# Patient Record
Sex: FEMALE | Race: WHITE | NOT HISPANIC OR LATINO | URBAN - METROPOLITAN AREA
[De-identification: names, ages, dates, MRNs, and addresses within clinical notes are randomized per-mention and may not be internally consistent; named-entity substitution may affect disease eponyms.]

---

## 2017-04-10 ENCOUNTER — EMERGENCY (EMERGENCY)
Facility: HOSPITAL | Age: 27
LOS: 1 days | Discharge: HOME | End: 2017-04-10
Attending: EMERGENCY MEDICINE | Admitting: EMERGENCY MEDICINE
Payer: MEDICAID

## 2017-04-10 VITALS
HEART RATE: 60 BPM | OXYGEN SATURATION: 100 % | SYSTOLIC BLOOD PRESSURE: 126 MMHG | TEMPERATURE: 98 F | DIASTOLIC BLOOD PRESSURE: 92 MMHG | RESPIRATION RATE: 16 BRPM

## 2017-04-10 VITALS
TEMPERATURE: 98 F | SYSTOLIC BLOOD PRESSURE: 114 MMHG | OXYGEN SATURATION: 99 % | DIASTOLIC BLOOD PRESSURE: 66 MMHG | HEART RATE: 69 BPM | RESPIRATION RATE: 17 BRPM

## 2017-04-10 DIAGNOSIS — Q21.1 ATRIAL SEPTAL DEFECT: Chronic | ICD-10-CM

## 2017-04-10 DIAGNOSIS — K46.9 UNSPECIFIED ABDOMINAL HERNIA WITHOUT OBSTRUCTION OR GANGRENE: Chronic | ICD-10-CM

## 2017-04-10 DIAGNOSIS — Z98.89 OTHER SPECIFIED POSTPROCEDURAL STATES: Chronic | ICD-10-CM

## 2017-04-10 DIAGNOSIS — O14.90 UNSPECIFIED PRE-ECLAMPSIA, UNSPECIFIED TRIMESTER: Chronic | ICD-10-CM

## 2017-04-10 LAB
ALBUMIN SERPL ELPH-MCNC: 4 G/DL — SIGNIFICANT CHANGE UP (ref 3.3–5)
ALP SERPL-CCNC: 113 U/L — SIGNIFICANT CHANGE UP (ref 40–120)
ALT FLD-CCNC: 57 U/L — HIGH (ref 4–33)
AST SERPL-CCNC: 35 U/L — HIGH (ref 4–32)
BASE EXCESS BLDV CALC-SCNC: 0.7 MMOL/L — SIGNIFICANT CHANGE UP
BASOPHILS # BLD AUTO: 0.04 K/UL — SIGNIFICANT CHANGE UP (ref 0–0.2)
BASOPHILS NFR BLD AUTO: 0.4 % — SIGNIFICANT CHANGE UP (ref 0–2)
BILIRUB SERPL-MCNC: 0.2 MG/DL — SIGNIFICANT CHANGE UP (ref 0.2–1.2)
BLOOD GAS VENOUS - CREATININE: 0.76 MG/DL — SIGNIFICANT CHANGE UP (ref 0.5–1.3)
BUN SERPL-MCNC: 18 MG/DL — SIGNIFICANT CHANGE UP (ref 7–23)
CALCIUM SERPL-MCNC: 9.2 MG/DL — SIGNIFICANT CHANGE UP (ref 8.4–10.5)
CHLORIDE BLDV-SCNC: 105 MMOL/L — SIGNIFICANT CHANGE UP (ref 96–108)
CHLORIDE SERPL-SCNC: 103 MMOL/L — SIGNIFICANT CHANGE UP (ref 98–107)
CO2 SERPL-SCNC: 26 MMOL/L — SIGNIFICANT CHANGE UP (ref 22–31)
CREAT SERPL-MCNC: 0.77 MG/DL — SIGNIFICANT CHANGE UP (ref 0.5–1.3)
EOSINOPHIL # BLD AUTO: 0.34 K/UL — SIGNIFICANT CHANGE UP (ref 0–0.5)
EOSINOPHIL NFR BLD AUTO: 3.1 % — SIGNIFICANT CHANGE UP (ref 0–6)
GAS PNL BLDV: 138 MMOL/L — SIGNIFICANT CHANGE UP (ref 136–146)
GLUCOSE BLDV-MCNC: 102 — HIGH (ref 70–99)
GLUCOSE SERPL-MCNC: 100 MG/DL — HIGH (ref 70–99)
HCO3 BLDV-SCNC: 23 MMOL/L — SIGNIFICANT CHANGE UP (ref 20–27)
HCT VFR BLD CALC: 38.9 % — SIGNIFICANT CHANGE UP (ref 34.5–45)
HCT VFR BLDV CALC: 41.3 % — SIGNIFICANT CHANGE UP (ref 34.5–45)
HGB BLD-MCNC: 13.2 G/DL — SIGNIFICANT CHANGE UP (ref 11.5–15.5)
HGB BLDV-MCNC: 13.5 G/DL — SIGNIFICANT CHANGE UP (ref 11.5–15.5)
IMM GRANULOCYTES NFR BLD AUTO: 0.2 % — SIGNIFICANT CHANGE UP (ref 0–1.5)
LACTATE BLDV-MCNC: 1.3 MMOL/L — SIGNIFICANT CHANGE UP (ref 0.5–2)
LIDOCAIN IGE QN: 79.2 U/L — HIGH (ref 7–60)
LYMPHOCYTES # BLD AUTO: 2.43 K/UL — SIGNIFICANT CHANGE UP (ref 1–3.3)
LYMPHOCYTES # BLD AUTO: 22.4 % — SIGNIFICANT CHANGE UP (ref 13–44)
MCHC RBC-ENTMCNC: 30.3 PG — SIGNIFICANT CHANGE UP (ref 27–34)
MCHC RBC-ENTMCNC: 33.9 % — SIGNIFICANT CHANGE UP (ref 32–36)
MCV RBC AUTO: 89.2 FL — SIGNIFICANT CHANGE UP (ref 80–100)
MONOCYTES # BLD AUTO: 0.58 K/UL — SIGNIFICANT CHANGE UP (ref 0–0.9)
MONOCYTES NFR BLD AUTO: 5.3 % — SIGNIFICANT CHANGE UP (ref 2–14)
NEUTROPHILS # BLD AUTO: 7.45 K/UL — HIGH (ref 1.8–7.4)
NEUTROPHILS NFR BLD AUTO: 68.6 % — SIGNIFICANT CHANGE UP (ref 43–77)
PCO2 BLDV: 52 MMHG — HIGH (ref 41–51)
PH BLDV: 7.32 PH — SIGNIFICANT CHANGE UP (ref 7.32–7.43)
PLATELET # BLD AUTO: 340 K/UL — SIGNIFICANT CHANGE UP (ref 150–400)
PMV BLD: 9.8 FL — SIGNIFICANT CHANGE UP (ref 7–13)
PO2 BLDV: 33 MMHG — LOW (ref 35–40)
POTASSIUM BLDV-SCNC: 3.8 MMOL/L — SIGNIFICANT CHANGE UP (ref 3.4–4.5)
POTASSIUM SERPL-MCNC: 4 MMOL/L — SIGNIFICANT CHANGE UP (ref 3.5–5.3)
POTASSIUM SERPL-SCNC: 4 MMOL/L — SIGNIFICANT CHANGE UP (ref 3.5–5.3)
PROT SERPL-MCNC: 7.3 G/DL — SIGNIFICANT CHANGE UP (ref 6–8.3)
RBC # BLD: 4.36 M/UL — SIGNIFICANT CHANGE UP (ref 3.8–5.2)
RBC # FLD: 11.7 % — SIGNIFICANT CHANGE UP (ref 10.3–14.5)
SAO2 % BLDV: 53.5 % — LOW (ref 60–85)
SODIUM SERPL-SCNC: 140 MMOL/L — SIGNIFICANT CHANGE UP (ref 135–145)
WBC # BLD: 10.86 K/UL — HIGH (ref 3.8–10.5)
WBC # FLD AUTO: 10.86 K/UL — HIGH (ref 3.8–10.5)

## 2017-04-10 PROCEDURE — 99285 EMERGENCY DEPT VISIT HI MDM: CPT | Mod: 25

## 2017-04-10 PROCEDURE — 93010 ELECTROCARDIOGRAM REPORT: CPT

## 2017-04-10 PROCEDURE — 76705 ECHO EXAM OF ABDOMEN: CPT | Mod: 26

## 2017-04-10 RX ORDER — ACETAMINOPHEN 500 MG
650 TABLET ORAL ONCE
Qty: 0 | Refills: 0 | Status: COMPLETED | OUTPATIENT
Start: 2017-04-10 | End: 2017-04-10

## 2017-04-10 RX ORDER — FAMOTIDINE 10 MG/ML
1 INJECTION INTRAVENOUS
Qty: 28 | Refills: 0 | OUTPATIENT
Start: 2017-04-10 | End: 2017-04-24

## 2017-04-10 RX ORDER — FAMOTIDINE 10 MG/ML
20 INJECTION INTRAVENOUS ONCE
Qty: 0 | Refills: 0 | Status: COMPLETED | OUTPATIENT
Start: 2017-04-10 | End: 2017-04-10

## 2017-04-10 RX ADMIN — Medication 10 MILLIGRAM(S): at 04:25

## 2017-04-10 RX ADMIN — FAMOTIDINE 20 MILLIGRAM(S): 10 INJECTION INTRAVENOUS at 03:50

## 2017-04-10 RX ADMIN — Medication 650 MILLIGRAM(S): at 04:50

## 2017-04-10 RX ADMIN — Medication 30 MILLILITER(S): at 03:50

## 2017-04-10 RX ADMIN — Medication 650 MILLIGRAM(S): at 03:50

## 2017-04-10 NOTE — ED PROVIDER NOTE - INTERPRETATION
normal sinus rhythm, Normal axis, Normal KS interval and QRS complex. There are no acute ischemic ST or T-wave changes.

## 2017-04-10 NOTE — ED PROVIDER NOTE - MEDICAL DECISION MAKING DETAILS
26F with epigastric pain radiating to the back. Ddx: pancreatitis, gastritis, PUD. Low suspicion for charan; will get RUQ US if LFTs abnormal. Do not suspect ACS or aortic dissection due to pt's age, presentation, and lack of risk factors. Plan: labs, pain control, ucg, reassess.

## 2017-04-10 NOTE — ED ADULT NURSE NOTE - CHIEF COMPLAINT
The patient is a 26y Female complaining of The patient is a 26y Female complaining of epigastric pain

## 2017-04-10 NOTE — ED ADULT TRIAGE NOTE - CHIEF COMPLAINT QUOTE
Patient c/o epigastric pain that also goes into her back. Patient c/o epigastric pain that also goes into her back. Heart surg 2002.

## 2017-04-10 NOTE — ED ADULT NURSE NOTE - OBJECTIVE STATEMENT
pt on bed aox4 c/o epigastric pain radiates to the back started early morning started as on and off and getting more constant and worsening, denies Nausea vomiting diarrhea dysuria CP HA dizziness palpitation MD at bedside eval the pt. will monitor

## 2017-04-10 NOTE — ED ADULT NURSE NOTE - PMH
37 weeks gestation of pregnancy    ASD (atrial septal defect)  H/o  Diabetes mellitus  gestational  Hernia  h/o  HTN (hypertension)  h/o  1st pregnancy  Shingles  h/o 2007

## 2017-04-10 NOTE — ED ADULT NURSE NOTE - CHPI ED SYMPTOMS NEG
no abdominal distension/no vomiting/no diarrhea/no nausea/no fever/no hematuria/no dysuria/no chills/no burning urination/no blood in stool

## 2017-04-10 NOTE — ED PROVIDER NOTE - OBJECTIVE STATEMENT
26F ASD s/p repair in 2002 p/w epigastric pain. The pain is sharp, intermittent, started as discomfort/fullness, now severe, radiating to the back, associated with SOB, no association with food. No fevers/chills, CP, cough, N/V/D, abd pain, or dysuria/urgency. MP 3/22.  No recent bad food though pt has been drinking protein shakes. No hx of gallstones/charan. 26F ASD s/p repair in 2002 p/w epigastric pain. The pain is sharp, intermittent, started as discomfort/fullness, now severe, radiating to the back, associated with SOB, no association with food. No fevers/chills, CP, cough, N/V/D, abd pain, or dysuria/urgency. LMP 3/22.  No recent bad food though pt has been drinking protein shakes. No hx of gallstones/charan or pancreatitis. Pt states she never drinks EtOH. 26F ASD s/p repair in 2002 p/w epigastric pain. The pain is sharp, intermittent, started as discomfort/fullness, now severe, radiating to the back, associated with SOB, no association with food. No fevers/chills, CP, cough, N/V/D, abd pain, or dysuria/urgency. LMP 3/22.  No recent bad food though pt has been drinking protein shakes. No hx of gallstones/charan or pancreatitis. Pt states she never drinks EtOH.  Klepfish: 26F PMH ASD repair (no issues since) Epigastric pain, intermittent x1d now constant over last few hrs, occasional radiation to back, no exacerbating/alleviating factors, not similar to prior, "sharp." Minimal SOB 2/2 pain now resolved. No f/c, CP, NVD, urinary complaints, rashes, sick contacts, recent travel.

## 2017-04-10 NOTE — ED PROVIDER NOTE - ATTENDING CONTRIBUTION TO CARE
26F PMH ASD repair p/w epigastric pain, no other systemic complaints. Vitals wnl. Exam minimal epigastric ttp. EKG wnl.  Ddx: Likely GERD/gastritis/PUD vs. pancreatitis vs. less likely cholelithiasis.   CBC, cmp, lipase. IVF, symptom control, reassess. 26F PMH ASD repair p/w epigastric pain, no other systemic complaints. Vitals wnl. Exam minimal epigastric ttp. EKG wnl. ucg neg.  Ddx: Likely GERD/gastritis/PUD vs. pancreatitis vs. less likely cholelithiasis.   CBC, cmp, lipase. IVF, symptom control, reassess.

## 2017-04-10 NOTE — ED PROVIDER NOTE - PROGRESS NOTE DETAILS
Klepfish: Still in pain and epigastric ttp. Slight elevated lipase and lfts. will get ruq us. Pt not allergic to percocet, will give percocet. reassess. Updated pt. Pt feels better. RUQ neg for charan. Will d/c with GI referral list. Klepfish: Tolerating po, benign exam, given copy of results, comfortable for dc.

## 2017-04-10 NOTE — ED PROVIDER NOTE - CARE PLAN
Principal Discharge DX:	Abdominal pain Principal Discharge DX:	Abdominal pain  Instructions for follow-up, activity and diet:	-- Take pepcid as prescribed. Your prescription is waiting for you at the pharmacy you selected. Please the additional information provided on gastritis. Take tylenol for pain. Avoid motrin/ibuprofen/aleve as these medications can irritate the stomach.  -- See referral list to make follow up appointment with a gastroenterology (GI) specialist within 1 week.  -- Return to ER immediately for new or worsening symptoms, any urgent issues, or for any concerns.

## 2017-04-10 NOTE — ED PROVIDER NOTE - PLAN OF CARE
-- Take pepcid as prescribed. Your prescription is waiting for you at the pharmacy you selected. Please the additional information provided on gastritis. Take tylenol for pain. Avoid motrin/ibuprofen/aleve as these medications can irritate the stomach.  -- See referral list to make follow up appointment with a gastroenterology (GI) specialist within 1 week.  -- Return to ER immediately for new or worsening symptoms, any urgent issues, or for any concerns.

## 2017-04-12 RX ORDER — FAMOTIDINE 10 MG/ML
1 INJECTION INTRAVENOUS
Qty: 28 | Refills: 0 | OUTPATIENT
Start: 2017-04-12 | End: 2017-04-26

## 2017-04-12 NOTE — ED POST DISCHARGE NOTE - REASON FOR FOLLOW-UP
Other Admin MURPHY Francois: Pt called and wants BW faxed over to [facility] [phone]. Faxed w/ confirmation

## 2017-06-03 ENCOUNTER — EMERGENCY (EMERGENCY)
Facility: HOSPITAL | Age: 27
LOS: 1 days | Discharge: HOME | End: 2017-06-03
Attending: EMERGENCY MEDICINE | Admitting: EMERGENCY MEDICINE
Payer: MEDICAID

## 2017-06-03 VITALS
HEART RATE: 53 BPM | RESPIRATION RATE: 18 BRPM | TEMPERATURE: 97 F | OXYGEN SATURATION: 100 % | DIASTOLIC BLOOD PRESSURE: 85 MMHG | SYSTOLIC BLOOD PRESSURE: 123 MMHG

## 2017-06-03 DIAGNOSIS — S39.92XA UNSPECIFIED INJURY OF LOWER BACK, INITIAL ENCOUNTER: ICD-10-CM

## 2017-06-03 DIAGNOSIS — Q21.1 ATRIAL SEPTAL DEFECT: Chronic | ICD-10-CM

## 2017-06-03 DIAGNOSIS — Z98.89 OTHER SPECIFIED POSTPROCEDURAL STATES: Chronic | ICD-10-CM

## 2017-06-03 DIAGNOSIS — O14.90 UNSPECIFIED PRE-ECLAMPSIA, UNSPECIFIED TRIMESTER: Chronic | ICD-10-CM

## 2017-06-03 DIAGNOSIS — K46.9 UNSPECIFIED ABDOMINAL HERNIA WITHOUT OBSTRUCTION OR GANGRENE: Chronic | ICD-10-CM

## 2017-06-03 PROCEDURE — 99284 EMERGENCY DEPT VISIT MOD MDM: CPT | Mod: 25

## 2017-06-03 RX ORDER — OXYCODONE HYDROCHLORIDE 5 MG/1
10 TABLET ORAL ONCE
Qty: 0 | Refills: 0 | Status: DISCONTINUED | OUTPATIENT
Start: 2017-06-03 | End: 2017-06-03

## 2017-06-03 RX ORDER — ACETAMINOPHEN 500 MG
975 TABLET ORAL ONCE
Qty: 0 | Refills: 0 | Status: COMPLETED | OUTPATIENT
Start: 2017-06-03 | End: 2017-06-03

## 2017-06-03 RX ORDER — IBUPROFEN 200 MG
600 TABLET ORAL ONCE
Qty: 0 | Refills: 0 | Status: DISCONTINUED | OUTPATIENT
Start: 2017-06-03 | End: 2017-06-03

## 2017-06-03 NOTE — ED PROVIDER NOTE - NS ED ROS FT
ROS: GENERAL: no fevers, no chills EYE: no visual changes ENT: no epistaxis, no eye pain, no throat pain CHEST: no pain with breathing,  no hemoptysis CARDIAC: no chest pain, no upper back pain GI: no abdominal pain, no hematemesis, no bright red blood per rectum : no dysuria, no hematuria MSK: no arm pain, no leg pain, +back pain SKIN: no purpura, no petechiae NEURO: no headache, no neck pain HEME: no easy bruising, no easy bleeding -ncohen

## 2017-06-03 NOTE — ED PROVIDER NOTE - OBJECTIVE STATEMENT
26 year old female otherwise healthy presenting with sudden onset L back pain when lifting child earlier today. Non-radiating. No numbness / tingling / urinary incont or retention / bowel probs / ivdu / fevers. On ros notes that had +pregnancy test lnmp 5 wks ago and has had vaginal spotting.

## 2017-06-03 NOTE — ED ADULT NURSE NOTE - OBJECTIVE STATEMENT
26 y.o. Female presents to the ED c/o lower back pain. . Pt reports lifting 3 year old child from floor and felt sharp pain. Pt drove herself to the ED. Pain upon movement. Pt also reports having positive home pregnancy test, but states wanting to have an . Pt states going to the bathroom today and when she wiped, she saw dark brown blood. Limited ROM d/t lower back pain. Denies numbness/tingling, weakness, CP, SOB, N/V/D, urinary/bowel complications, fever/chills. Pt is in no current distress. Comfort and safety provided.

## 2017-06-03 NOTE — ED ADULT TRIAGE NOTE - CHIEF COMPLAINT QUOTE
b/l lower back pain s/p picking a kid in the floor, took 600mg Advil 4hrs ago without much improvement  Drove herself here

## 2017-06-03 NOTE — ED PROVIDER NOTE - PHYSICAL EXAMINATION
Gen: well appearing, of stated age, no acute distress; Head: NC, AT; ENT: MMM, no uvular deviation; Neck: supple with full ROM; Chest: CTAB, no retractions, rate normal, appears to breath comfortable; Heart: RRR S1S2 No JVD No peripheral edema b/l pulses 2+ in arms and legs; Abd: Soft non-tender, no rebound or guarding, no masses, no bermudez sign, no mcburney tenderness, no CVAT; Back: No spinal deformity, no midline ttp, R paraspinal ttp mid l spine, -slr, 5/5 ehl, 2+ bl patella reflex; Ext: Moving all 4 limbs without obvious impairment to ROM, no obvious weakness; Neuro: fluid speech, no focal deficits, oriented to person, place, situation; Psych: No anxiety, depression or pressured speech noted; Skin: no utricaria, no diffuse rash. -ncohen

## 2017-06-03 NOTE — ED ADULT NURSE NOTE - PMH
ASD (atrial septal defect)  H/o  Diabetes mellitus  gestational  Hernia  h/o  HTN (hypertension)  h/o  1st pregnancy  Shingles  h/o 2007

## 2017-06-03 NOTE — ED PROVIDER NOTE - ATTENDING CONTRIBUTION TO CARE
patient states home preg test positive presenting with low back pain and dark brown blood from vagina. Pain was related to lifting her child at home.  concern for spontaneous  vs ectopic vs msk pain. will check blood work and pelvic sono, analgesia and reassess.

## 2017-06-03 NOTE — ED PROVIDER NOTE - CARE PLAN
Principal Discharge DX:	Back pain, unspecified back location, unspecified back pain laterality, unspecified chronicity

## 2017-06-03 NOTE — ED PROVIDER NOTE - PROGRESS NOTE DETAILS
patient with negative urine pregnancy and beta hCG. discussed low likelyhood of GYN pathology related to back pain which seems msk in nature as patient symptoms have improved at this time. will cancel pelvic sono. BRIANNE Guaman MD : Patients symptoms have improved. In bed and comfortable. Lengthy discussion regarding treatment, discharge instructions, and need for follow up. Patient understands and wishes to go home. BRIANNE Guaman MD

## 2017-06-04 VITALS
TEMPERATURE: 98 F | OXYGEN SATURATION: 97 % | RESPIRATION RATE: 16 BRPM | SYSTOLIC BLOOD PRESSURE: 117 MMHG | HEART RATE: 56 BPM | DIASTOLIC BLOOD PRESSURE: 78 MMHG

## 2017-06-04 LAB
ALBUMIN SERPL ELPH-MCNC: 3.9 G/DL — SIGNIFICANT CHANGE UP (ref 3.3–5)
ALP SERPL-CCNC: 92 U/L — SIGNIFICANT CHANGE UP (ref 40–120)
ALT FLD-CCNC: 20 U/L RC — SIGNIFICANT CHANGE UP (ref 10–45)
ANION GAP SERPL CALC-SCNC: 9 MMOL/L — SIGNIFICANT CHANGE UP (ref 5–17)
APPEARANCE UR: CLEAR — SIGNIFICANT CHANGE UP
AST SERPL-CCNC: 21 U/L — SIGNIFICANT CHANGE UP (ref 10–40)
BACTERIA # UR AUTO: ABNORMAL /HPF
BILIRUB SERPL-MCNC: 0.2 MG/DL — SIGNIFICANT CHANGE UP (ref 0.2–1.2)
BILIRUB UR-MCNC: NEGATIVE — SIGNIFICANT CHANGE UP
BLD GP AB SCN SERPL QL: NEGATIVE — SIGNIFICANT CHANGE UP
BUN SERPL-MCNC: 10 MG/DL — SIGNIFICANT CHANGE UP (ref 7–23)
CALCIUM SERPL-MCNC: 9.7 MG/DL — SIGNIFICANT CHANGE UP (ref 8.4–10.5)
CHLORIDE SERPL-SCNC: 105 MMOL/L — SIGNIFICANT CHANGE UP (ref 96–108)
CO2 SERPL-SCNC: 26 MMOL/L — SIGNIFICANT CHANGE UP (ref 22–31)
COLOR SPEC: YELLOW — SIGNIFICANT CHANGE UP
COMMENT - URINE: SIGNIFICANT CHANGE UP
CREAT SERPL-MCNC: 0.72 MG/DL — SIGNIFICANT CHANGE UP (ref 0.5–1.3)
DIFF PNL FLD: ABNORMAL
EPI CELLS # UR: SIGNIFICANT CHANGE UP /HPF
GLUCOSE SERPL-MCNC: 93 MG/DL — SIGNIFICANT CHANGE UP (ref 70–99)
GLUCOSE UR QL: NEGATIVE — SIGNIFICANT CHANGE UP
HCG SERPL-ACNC: <2 MIU/ML — SIGNIFICANT CHANGE UP
HCG UR QL: NEGATIVE — SIGNIFICANT CHANGE UP
HCT VFR BLD CALC: 38.7 % — SIGNIFICANT CHANGE UP (ref 34.5–45)
HGB BLD-MCNC: 12.9 G/DL — SIGNIFICANT CHANGE UP (ref 11.5–15.5)
KETONES UR-MCNC: NEGATIVE — SIGNIFICANT CHANGE UP
LEUKOCYTE ESTERASE UR-ACNC: NEGATIVE — SIGNIFICANT CHANGE UP
MCHC RBC-ENTMCNC: 30.4 PG — SIGNIFICANT CHANGE UP (ref 27–34)
MCHC RBC-ENTMCNC: 33.3 GM/DL — SIGNIFICANT CHANGE UP (ref 32–36)
MCV RBC AUTO: 91.1 FL — SIGNIFICANT CHANGE UP (ref 80–100)
NITRITE UR-MCNC: NEGATIVE — SIGNIFICANT CHANGE UP
PH UR: 7 — SIGNIFICANT CHANGE UP (ref 5–8)
PLATELET # BLD AUTO: 338 K/UL — SIGNIFICANT CHANGE UP (ref 150–400)
POTASSIUM SERPL-MCNC: 4.2 MMOL/L — SIGNIFICANT CHANGE UP (ref 3.5–5.3)
POTASSIUM SERPL-SCNC: 4.2 MMOL/L — SIGNIFICANT CHANGE UP (ref 3.5–5.3)
PROT SERPL-MCNC: 7.1 G/DL — SIGNIFICANT CHANGE UP (ref 6–8.3)
PROT UR-MCNC: 30 MG/DL
RBC # BLD: 4.25 M/UL — SIGNIFICANT CHANGE UP (ref 3.8–5.2)
RBC # FLD: 11.3 % — SIGNIFICANT CHANGE UP (ref 10.3–14.5)
RBC CASTS # UR COMP ASSIST: SIGNIFICANT CHANGE UP /HPF (ref 0–2)
RH IG SCN BLD-IMP: POSITIVE — SIGNIFICANT CHANGE UP
SODIUM SERPL-SCNC: 140 MMOL/L — SIGNIFICANT CHANGE UP (ref 135–145)
SP GR SPEC: 1.03 — HIGH (ref 1.01–1.02)
UROBILINOGEN FLD QL: NEGATIVE — SIGNIFICANT CHANGE UP
WBC # BLD: 8.8 K/UL — SIGNIFICANT CHANGE UP (ref 3.8–10.5)
WBC # FLD AUTO: 8.8 K/UL — SIGNIFICANT CHANGE UP (ref 3.8–10.5)
WBC UR QL: SIGNIFICANT CHANGE UP /HPF (ref 0–5)

## 2017-06-04 PROCEDURE — 99284 EMERGENCY DEPT VISIT MOD MDM: CPT | Mod: 25

## 2017-06-04 PROCEDURE — 86901 BLOOD TYPING SEROLOGIC RH(D): CPT

## 2017-06-04 PROCEDURE — 80053 COMPREHEN METABOLIC PANEL: CPT

## 2017-06-04 PROCEDURE — 96374 THER/PROPH/DIAG INJ IV PUSH: CPT

## 2017-06-04 PROCEDURE — 81025 URINE PREGNANCY TEST: CPT

## 2017-06-04 PROCEDURE — 85027 COMPLETE CBC AUTOMATED: CPT

## 2017-06-04 PROCEDURE — 87086 URINE CULTURE/COLONY COUNT: CPT

## 2017-06-04 PROCEDURE — 86900 BLOOD TYPING SEROLOGIC ABO: CPT

## 2017-06-04 PROCEDURE — 84702 CHORIONIC GONADOTROPIN TEST: CPT

## 2017-06-04 PROCEDURE — 86850 RBC ANTIBODY SCREEN: CPT

## 2017-06-04 PROCEDURE — 81001 URINALYSIS AUTO W/SCOPE: CPT

## 2017-06-04 RX ORDER — DIAZEPAM 5 MG
5 TABLET ORAL ONCE
Qty: 0 | Refills: 0 | Status: DISCONTINUED | OUTPATIENT
Start: 2017-06-04 | End: 2017-06-04

## 2017-06-04 RX ORDER — KETOROLAC TROMETHAMINE 30 MG/ML
15 SYRINGE (ML) INJECTION ONCE
Qty: 0 | Refills: 0 | Status: DISCONTINUED | OUTPATIENT
Start: 2017-06-04 | End: 2017-06-04

## 2017-06-04 RX ADMIN — OXYCODONE HYDROCHLORIDE 10 MILLIGRAM(S): 5 TABLET ORAL at 01:00

## 2017-06-04 RX ADMIN — OXYCODONE HYDROCHLORIDE 10 MILLIGRAM(S): 5 TABLET ORAL at 00:03

## 2017-06-04 RX ADMIN — Medication 15 MILLIGRAM(S): at 04:23

## 2017-06-04 RX ADMIN — Medication 5 MILLIGRAM(S): at 02:41

## 2017-06-04 RX ADMIN — Medication 975 MILLIGRAM(S): at 00:03

## 2017-06-04 RX ADMIN — Medication 15 MILLIGRAM(S): at 03:54

## 2017-06-05 LAB
CULTURE RESULTS: SIGNIFICANT CHANGE UP
SPECIMEN SOURCE: SIGNIFICANT CHANGE UP

## 2017-06-07 NOTE — ED POST DISCHARGE NOTE - OTHER COMMUNICATION
Spoke with patient. Asymptomatic for UTI - Denies fever/chills, abd pain, hematuria, dysuria, urgency, frequency. No need for tx. Recommended f/u with PCP. - Mami Dickens PA-C

## 2017-07-18 ENCOUNTER — APPOINTMENT (OUTPATIENT)
Dept: PHYSICAL MEDICINE AND REHAB | Facility: CLINIC | Age: 27
End: 2017-07-18

## 2017-08-16 ENCOUNTER — APPOINTMENT (OUTPATIENT)
Dept: OBGYN | Facility: HOSPITAL | Age: 27
End: 2017-08-16

## 2017-08-16 ENCOUNTER — RESULT CHARGE (OUTPATIENT)
Age: 27
End: 2017-08-16

## 2017-08-16 ENCOUNTER — OUTPATIENT (OUTPATIENT)
Dept: OUTPATIENT SERVICES | Facility: HOSPITAL | Age: 27
LOS: 1 days | End: 2017-08-16

## 2017-08-16 DIAGNOSIS — Z32.00 ENCOUNTER FOR PREGNANCY TEST, RESULT UNKNOWN: ICD-10-CM

## 2017-08-16 DIAGNOSIS — O14.90 UNSPECIFIED PRE-ECLAMPSIA, UNSPECIFIED TRIMESTER: Chronic | ICD-10-CM

## 2017-08-16 DIAGNOSIS — K46.9 UNSPECIFIED ABDOMINAL HERNIA WITHOUT OBSTRUCTION OR GANGRENE: Chronic | ICD-10-CM

## 2017-08-16 DIAGNOSIS — Z98.89 OTHER SPECIFIED POSTPROCEDURAL STATES: Chronic | ICD-10-CM

## 2017-08-16 DIAGNOSIS — Q21.1 ATRIAL SEPTAL DEFECT: Chronic | ICD-10-CM

## 2017-08-16 LAB
HCG UR QL: NEGATIVE
QUALITY CONTROL: YES

## 2017-08-24 ENCOUNTER — APPOINTMENT (OUTPATIENT)
Dept: OBGYN | Facility: HOSPITAL | Age: 27
End: 2017-08-24

## 2018-05-25 ENCOUNTER — EMERGENCY (EMERGENCY)
Facility: HOSPITAL | Age: 28
LOS: 1 days | Discharge: HOME | End: 2018-05-25
Attending: EMERGENCY MEDICINE | Admitting: EMERGENCY MEDICINE
Payer: MEDICAID

## 2018-05-25 VITALS
HEART RATE: 63 BPM | DIASTOLIC BLOOD PRESSURE: 60 MMHG | OXYGEN SATURATION: 99 % | TEMPERATURE: 98 F | RESPIRATION RATE: 18 BRPM | SYSTOLIC BLOOD PRESSURE: 129 MMHG

## 2018-05-25 DIAGNOSIS — O14.90 UNSPECIFIED PRE-ECLAMPSIA, UNSPECIFIED TRIMESTER: Chronic | ICD-10-CM

## 2018-05-25 DIAGNOSIS — Z98.89 OTHER SPECIFIED POSTPROCEDURAL STATES: Chronic | ICD-10-CM

## 2018-05-25 DIAGNOSIS — K46.9 UNSPECIFIED ABDOMINAL HERNIA WITHOUT OBSTRUCTION OR GANGRENE: Chronic | ICD-10-CM

## 2018-05-25 DIAGNOSIS — Q21.1 ATRIAL SEPTAL DEFECT: Chronic | ICD-10-CM

## 2018-05-25 PROCEDURE — 99285 EMERGENCY DEPT VISIT HI MDM: CPT

## 2018-05-25 PROCEDURE — 99234 HOSP IP/OBS SM DT SF/LOW 45: CPT

## 2018-05-25 NOTE — ED ADULT TRIAGE NOTE - CHIEF COMPLAINT QUOTE
pt had breast augmentation surgery on 4/26, went for "post-op massages" and developed redness, burning and irritation at surgical site, denies any fevers, spoke with her md and was prescribed po abx however pharmacy was closed. also c/o stuffy nose and cold symptoms, pmh asd repair in 2002. comfortable in triage.

## 2018-05-26 VITALS
HEART RATE: 80 BPM | SYSTOLIC BLOOD PRESSURE: 121 MMHG | DIASTOLIC BLOOD PRESSURE: 70 MMHG | OXYGEN SATURATION: 100 % | TEMPERATURE: 98 F | RESPIRATION RATE: 17 BRPM

## 2018-05-26 LAB
ALBUMIN SERPL ELPH-MCNC: 3.7 G/DL — SIGNIFICANT CHANGE UP (ref 3.3–5)
ALP SERPL-CCNC: 98 U/L — SIGNIFICANT CHANGE UP (ref 40–120)
ALT FLD-CCNC: 27 U/L — SIGNIFICANT CHANGE UP (ref 4–33)
APPEARANCE UR: SIGNIFICANT CHANGE UP
AST SERPL-CCNC: 27 U/L — SIGNIFICANT CHANGE UP (ref 4–32)
BASE EXCESS BLDV CALC-SCNC: 2.9 MMOL/L — SIGNIFICANT CHANGE UP
BASOPHILS # BLD AUTO: 0.06 K/UL — SIGNIFICANT CHANGE UP (ref 0–0.2)
BASOPHILS NFR BLD AUTO: 0.8 % — SIGNIFICANT CHANGE UP (ref 0–2)
BILIRUB SERPL-MCNC: 0.2 MG/DL — SIGNIFICANT CHANGE UP (ref 0.2–1.2)
BILIRUB UR-MCNC: NEGATIVE — SIGNIFICANT CHANGE UP
BLOOD GAS VENOUS - CREATININE: 0.77 MG/DL — SIGNIFICANT CHANGE UP (ref 0.5–1.3)
BLOOD UR QL VISUAL: NEGATIVE — SIGNIFICANT CHANGE UP
BUN SERPL-MCNC: 12 MG/DL — SIGNIFICANT CHANGE UP (ref 7–23)
CALCIUM SERPL-MCNC: 9.1 MG/DL — SIGNIFICANT CHANGE UP (ref 8.4–10.5)
CHLORIDE BLDV-SCNC: 106 MMOL/L — SIGNIFICANT CHANGE UP (ref 96–108)
CHLORIDE SERPL-SCNC: 100 MMOL/L — SIGNIFICANT CHANGE UP (ref 98–107)
CO2 SERPL-SCNC: 27 MMOL/L — SIGNIFICANT CHANGE UP (ref 22–31)
COLOR SPEC: YELLOW — SIGNIFICANT CHANGE UP
CREAT SERPL-MCNC: 0.76 MG/DL — SIGNIFICANT CHANGE UP (ref 0.5–1.3)
EOSINOPHIL # BLD AUTO: 0.52 K/UL — HIGH (ref 0–0.5)
EOSINOPHIL NFR BLD AUTO: 7.2 % — HIGH (ref 0–6)
GAS PNL BLDV: 139 MMOL/L — SIGNIFICANT CHANGE UP (ref 136–146)
GLUCOSE BLDV-MCNC: 102 — HIGH (ref 70–99)
GLUCOSE SERPL-MCNC: 101 MG/DL — HIGH (ref 70–99)
GLUCOSE UR-MCNC: NEGATIVE — SIGNIFICANT CHANGE UP
HBA1C BLD-MCNC: 4.5 % — SIGNIFICANT CHANGE UP (ref 4–5.6)
HCO3 BLDV-SCNC: 25 MMOL/L — SIGNIFICANT CHANGE UP (ref 20–27)
HCT VFR BLD CALC: 32.7 % — LOW (ref 34.5–45)
HCT VFR BLDV CALC: 35.7 % — SIGNIFICANT CHANGE UP (ref 34.5–45)
HGB BLD-MCNC: 10.9 G/DL — LOW (ref 11.5–15.5)
HGB BLDV-MCNC: 11.6 G/DL — SIGNIFICANT CHANGE UP (ref 11.5–15.5)
IMM GRANULOCYTES # BLD AUTO: 0.02 # — SIGNIFICANT CHANGE UP
IMM GRANULOCYTES NFR BLD AUTO: 0.3 % — SIGNIFICANT CHANGE UP (ref 0–1.5)
KETONES UR-MCNC: NEGATIVE — SIGNIFICANT CHANGE UP
LACTATE BLDV-MCNC: 0.9 MMOL/L — SIGNIFICANT CHANGE UP (ref 0.5–2)
LEUKOCYTE ESTERASE UR-ACNC: NEGATIVE — SIGNIFICANT CHANGE UP
LYMPHOCYTES # BLD AUTO: 2.2 K/UL — SIGNIFICANT CHANGE UP (ref 1–3.3)
LYMPHOCYTES # BLD AUTO: 30.4 % — SIGNIFICANT CHANGE UP (ref 13–44)
MCHC RBC-ENTMCNC: 29.7 PG — SIGNIFICANT CHANGE UP (ref 27–34)
MCHC RBC-ENTMCNC: 33.3 % — SIGNIFICANT CHANGE UP (ref 32–36)
MCV RBC AUTO: 89.1 FL — SIGNIFICANT CHANGE UP (ref 80–100)
MONOCYTES # BLD AUTO: 0.62 K/UL — SIGNIFICANT CHANGE UP (ref 0–0.9)
MONOCYTES NFR BLD AUTO: 8.6 % — SIGNIFICANT CHANGE UP (ref 2–14)
MUCOUS THREADS # UR AUTO: SIGNIFICANT CHANGE UP
NEUTROPHILS # BLD AUTO: 3.81 K/UL — SIGNIFICANT CHANGE UP (ref 1.8–7.4)
NEUTROPHILS NFR BLD AUTO: 52.7 % — SIGNIFICANT CHANGE UP (ref 43–77)
NITRITE UR-MCNC: NEGATIVE — SIGNIFICANT CHANGE UP
NRBC # FLD: 0 — SIGNIFICANT CHANGE UP
PCO2 BLDV: 56 MMHG — HIGH (ref 41–51)
PH BLDV: 7.33 PH — SIGNIFICANT CHANGE UP (ref 7.32–7.43)
PH UR: 6 — SIGNIFICANT CHANGE UP (ref 4.6–8)
PLATELET # BLD AUTO: 306 K/UL — SIGNIFICANT CHANGE UP (ref 150–400)
PMV BLD: 10.1 FL — SIGNIFICANT CHANGE UP (ref 7–13)
PO2 BLDV: 28 MMHG — LOW (ref 35–40)
POTASSIUM BLDV-SCNC: 3.6 MMOL/L — SIGNIFICANT CHANGE UP (ref 3.4–4.5)
POTASSIUM SERPL-MCNC: 3.7 MMOL/L — SIGNIFICANT CHANGE UP (ref 3.5–5.3)
POTASSIUM SERPL-SCNC: 3.7 MMOL/L — SIGNIFICANT CHANGE UP (ref 3.5–5.3)
PROT SERPL-MCNC: 6.8 G/DL — SIGNIFICANT CHANGE UP (ref 6–8.3)
PROT UR-MCNC: 20 MG/DL — SIGNIFICANT CHANGE UP
RBC # BLD: 3.67 M/UL — LOW (ref 3.8–5.2)
RBC # FLD: 11.9 % — SIGNIFICANT CHANGE UP (ref 10.3–14.5)
RBC CASTS # UR COMP ASSIST: SIGNIFICANT CHANGE UP (ref 0–?)
SAO2 % BLDV: 42.2 % — LOW (ref 60–85)
SODIUM SERPL-SCNC: 139 MMOL/L — SIGNIFICANT CHANGE UP (ref 135–145)
SP GR SPEC: 1.03 — SIGNIFICANT CHANGE UP (ref 1–1.04)
SQUAMOUS # UR AUTO: SIGNIFICANT CHANGE UP
UROBILINOGEN FLD QL: NORMAL MG/DL — SIGNIFICANT CHANGE UP
WBC # BLD: 7.23 K/UL — SIGNIFICANT CHANGE UP (ref 3.8–10.5)
WBC # FLD AUTO: 7.23 K/UL — SIGNIFICANT CHANGE UP (ref 3.8–10.5)
WBC UR QL: SIGNIFICANT CHANGE UP (ref 0–?)

## 2018-05-26 RX ORDER — SODIUM CHLORIDE 9 MG/ML
1000 INJECTION INTRAMUSCULAR; INTRAVENOUS; SUBCUTANEOUS
Qty: 0 | Refills: 0 | Status: DISCONTINUED | OUTPATIENT
Start: 2018-05-26 | End: 2018-05-29

## 2018-05-26 RX ORDER — SODIUM CHLORIDE 9 MG/ML
1000 INJECTION INTRAMUSCULAR; INTRAVENOUS; SUBCUTANEOUS ONCE
Qty: 0 | Refills: 0 | Status: COMPLETED | OUTPATIENT
Start: 2018-05-26 | End: 2018-05-26

## 2018-05-26 RX ORDER — SODIUM CHLORIDE 9 MG/ML
1000 INJECTION, SOLUTION INTRAVENOUS ONCE
Qty: 0 | Refills: 0 | Status: COMPLETED | OUTPATIENT
Start: 2018-05-26 | End: 2018-05-26

## 2018-05-26 RX ORDER — KETOROLAC TROMETHAMINE 30 MG/ML
30 SYRINGE (ML) INJECTION ONCE
Qty: 0 | Refills: 0 | Status: DISCONTINUED | OUTPATIENT
Start: 2018-05-26 | End: 2018-05-26

## 2018-05-26 RX ORDER — ACETAMINOPHEN 500 MG
650 TABLET ORAL ONCE
Qty: 0 | Refills: 0 | Status: COMPLETED | OUTPATIENT
Start: 2018-05-26 | End: 2018-05-26

## 2018-05-26 RX ADMIN — Medication 100 MILLIGRAM(S): at 19:58

## 2018-05-26 RX ADMIN — Medication 650 MILLIGRAM(S): at 03:58

## 2018-05-26 RX ADMIN — Medication 30 MILLIGRAM(S): at 19:38

## 2018-05-26 RX ADMIN — SODIUM CHLORIDE 125 MILLILITER(S): 9 INJECTION INTRAMUSCULAR; INTRAVENOUS; SUBCUTANEOUS at 12:04

## 2018-05-26 RX ADMIN — Medication 100 MILLIGRAM(S): at 12:04

## 2018-05-26 RX ADMIN — Medication 30 MILLIGRAM(S): at 19:53

## 2018-05-26 RX ADMIN — Medication 100 MILLIGRAM(S): at 03:55

## 2018-05-26 RX ADMIN — SODIUM CHLORIDE 1000 MILLILITER(S): 9 INJECTION, SOLUTION INTRAVENOUS at 03:55

## 2018-05-26 RX ADMIN — SODIUM CHLORIDE 1000 MILLILITER(S): 9 INJECTION INTRAMUSCULAR; INTRAVENOUS; SUBCUTANEOUS at 07:18

## 2018-05-26 NOTE — ED PROVIDER NOTE - MEDICAL DECISION MAKING DETAILS
27F p/w s/p surgery Apr 27.  Dr Hogan in [place] - breast reduction.  Incisions closed, follow up was okay.  Pt went to some kind of post op care 3 days ago involving suction that led to incision opening and blisters x 2 days, which have since popped.  Pt developed pain and redness and took a picture of it - sent it to the Surgeon who rx'ed abx but her pharmacy was closed.  Came in tonight due to worsening pain.  Pt notes some yellow drainage.  Did not take anything for pain.  L surgical site appears infected.  [facility] - Dr ervin.  Rx abx, check labs, give fluids and pain meds, will reach out to plastic surgeon - CDU for IV abx and plastics consult.

## 2018-05-26 NOTE — ED CDU PROVIDER INITIAL DAY NOTE - RESPIRATORY, MLM
Breath sounds clear and equal bilaterally. Breath sounds clear and equal bilaterally. No rales rhonchi or wheezing.

## 2018-05-26 NOTE — ED CDU PROVIDER INITIAL DAY NOTE - PROGRESS NOTE DETAILS
Received sign out from MURPHY Simeon. pt seen and examined by Dr. Montoya. pt states feeling much better, redness has improved and no longer has so much pain. no fever, chills, discharge. exam: lungs clear b/l. heart rrr. b/l breasts with healing surgical scars. left breast with erythema of outer lower quadrant of surgical scar, non-tender, no masses or abscess. no streaking or crepitus. no discharge. plan to continue IV abx. as per Dr. Dickens, via phone plastic surgery suggesting continuing IV clindamycin. PT c/o pain worsening. tender on exam. will order ultrasound to r/o abscess and continue IV abx overnight. pt amenable with plan. Received sign out from MURPHY Simeon. pt seen and examined by Dr. Dickens and CASSIE. pt states feeling much better, redness has improved and no longer has so much pain. no fever, chills, discharge. exam: lungs clear b/l. heart rrr. b/l breasts with healing surgical scars. left breast with erythema of outer lower quadrant of surgical scar, non-tender, no masses or abscess. no streaking or crepitus. no discharge. plan to continue IV abx. as per Dr. Dickens, via phone plastic surgery suggesting continuing IV clindamycin. No change in exam, no inc. redness or fluctuance, pt. states has skin burning c/o, no fever/chills, no pleuritic pain. Unlikely to change plan based on U/S, if fluid collection forming, likely small and would try abx prior to drainage. Pt. amenable to seeing MD on Tues. and returning for worsening sx. MURPHY Tse: Received sign out from Dr. Akhtar and MURPHY Prabhakar to dc after clinda. Pt examined bedside, feeling well, wants to go home. cleared for dc by Dr. Akhtar with clinda and f/u with surgeon on tuesday. pt agrees with plan, strict return precautions given. examined bedside with SLOANE Foster - no active drainage or fluctuance of breasts. ready for dc.

## 2018-05-26 NOTE — ED CDU PROVIDER DISPOSITION NOTE - CLINICAL COURSE
27F s/p breast reduction surgery and following vacuum suction tx at post-op clinic dev. pain and redness with skin blister that drained clear liquid. No fever/chills, no diffuse pain, saw MD 2d ago, rx abx but did not fill. To ED this a.m., communicated with Plastics and planned IV abx doses and FU Tues. in clinic. Pt. initially stated improved pain and redness, then in evening c/o inc. burning to skin in L breast area. Re-examined with no change, no obv. fluctuance or abscess. Will dc home with po abx, clinda 600 tid with meals and see Dr. Hogan on Tues.

## 2018-05-26 NOTE — ED PROVIDER NOTE - PROGRESS NOTE DETAILS
D/w Dr Hogan's office, they will let him know in AM, and he can consult tomorrow to eval for possible drainage.  D/w CARRILLO Quinn for CDU. Dr Hogan called back - he saw a picture she sent earlier, he would prefer to deal with any drainage after a few days of Abx - continue IV abx here for a couple of doses, d/c home later if feeling better f/u in Dr Hogan office.

## 2018-05-26 NOTE — ED PROVIDER NOTE - OBJECTIVE STATEMENT
27F p/w s/p surgery Apr 27.  Dr Hogan in [place] - breast reduction.  Incisions closed, follow up was okay.  Pt went to some kind of post op care 3 days agoinvolving suction that led to incision opening and blisters x 2 days, which have since popped.  Pt developed pain and redness and took a picure of it - sent it to the Surgeon who rx'ed abx but her pharmacy was closed.  Came in tonight due to worsening pain.  Pt notes some yellow drainage.  Did not take anything for pain. 27F p/w s/p surgery Apr 27.  Dr Hogan in [place] - breast reduction.  Incisions closed, follow up was okay.  Pt went to some kind of post op care 3 days agoinvolving suction that led to incision opening and blisters x 2 days, which have since popped.  Pt developed pain and redness and took a picure of it - sent it to the Surgeon who rx'ed abx but her pharmacy was closed.  Came in tonight due to worsening pain.  Pt notes some yellow drainage.  Did not take anything for pain.  [facility] - Dr walker.  Rx abx, check labs, give fluids and pain meds, will reach out to plastic surgeon - CDU for IV abx and plastics consult.   VS:  unremarkable    GEN - NAD; malaise; A+O x3   HEAD - NC/AT     ENT - PEERL, EOMI, mucous membranes  moist , no discharge    R ear TM clear.  L ear linear linear ?abrasion to TM.  No fluid or pus discharge.    NECK: Neck supple, non-tender without lymphadenopathy, no masses, no JVD  PULM - CTA b/l,  symmetric breath sounds  Bilat breasts - R breast surgical incision - mild redness, no dehiscence.  L breast surg incision, small amounts of yellow fluid under incision, redness and swelling surrounding.  No active drainage.    COR -  normal heart sounds    ABD - , ND, NT, soft, no guarding, no rebound, no masses    BACK - no CVA tenderness, nontender spine     EXTREMS - no edema, no deformity, warm and well perfused    SKIN - no rash or bruising      NEUROLOGIC - alert, CN 2-12 intact, sensation nl, motor 5/5 RUE/LUE/RLE/LLE. 27F p/w s/p surgery Apr 27.  Dr Hogan in [place] - breast reduction.  Incisions closed, follow up was okay.  Pt went to some kind of post op care 3 days ago involving suction that led to incision opening and blisters x 2 days, which have since popped.  Pt developed pain and redness and took a picture of it - sent it to the Surgeon who rx'ed abx but her pharmacy was closed.  Came in tonight due to worsening pain.  Pt notes some yellow drainage.  Did not take anything for pain.  L surgical site appears infected.  [facility] - Dr ervin.  Rx abx, check labs, give fluids and pain meds, will reach out to plastic surgeon - CDU for IV abx and plastics consult.   VS:  unremarkable    GEN - NAD; malaise; A+O x3   HEAD - NC/AT     ENT - PEERL, EOMI, mucous membranes  moist , no discharge    R ear TM clear.  L ear linear linear ?abrasion to TM.  No fluid or pus discharge.    NECK: Neck supple, non-tender without lymphadenopathy, no masses, no JVD  PULM - CTA b/l,  symmetric breath sounds  Bilat breasts - R breast surgical incision - mild redness, no dehiscence.  L breast surg incision, small amounts of yellow fluid under incision, redness and swelling surrounding.  No active drainage.    COR -  normal heart sounds    ABD - , ND, NT, soft, no guarding, no rebound, no masses    BACK - no CVA tenderness, nontender spine     EXTREMS - no edema, no deformity, warm and well perfused    SKIN - no rash or bruising      NEUROLOGIC - alert, CN 2-12 intact, sensation nl, motor 5/5 RUE/LUE/RLE/LLE.

## 2018-05-26 NOTE — ED CDU PROVIDER INITIAL DAY NOTE - PHYSICAL EXAMINATION
Rt breast - well healing incisions on around the areola, as well sides of the breast  Lt breast - (+) erythema and blisterous  lesions over incision at 3 o'clock with small areas of dehiscence and yellowish drainage; no streaking Rt breast - well healing incisions on around the areola, as well sides of the breast  Lt breast - (+) erythema and blisterous  lesions over incision at 3 o'clock with small areas of dehiscence and yellowish drainage; no streaking.

## 2018-05-26 NOTE — ED ADULT NURSE NOTE - OBJECTIVE STATEMENT
rec'd pt aaox3 in room 9 c/o pain and a small amount of yellow exudate underneath the L breast.  Pt. s/p bilateral breast augmentation on 04/26/18, states she's been receiving post op care from a different facility than where her sx was completed.  Pt. was prescribed antibiotic today but was not able to fill the prescription bc her pharmacy was closed.  Pt. denies fever/chills.  Redness and mild swelling noted underneath both of pt.'s breasts, L more so than R.  Pt. awaiting MD evaluation and orders.

## 2018-05-26 NOTE — ED ADULT NURSE REASSESSMENT NOTE - NS ED NURSE REASSESS COMMENT FT1
pt. sleeping comfortably in bed, reports mild discomfort under L breast, not requesting pain medicine at the present time.  Vital signs stable. Pt. awaiting CDU availability and transfer to the unit.

## 2018-05-26 NOTE — ED CDU PROVIDER INITIAL DAY NOTE - ATTENDING CONTRIBUTION TO CARE
27F p/w s/p surgery Apr 27.  Dr Hogan in [place] - breast reduction.  Incisions closed, follow up was okay.  Pt went to some kind of post op care 3 days ago involving suction that led to incision opening and blisters x 2 days, which have since popped.  Pt developed pain and redness and took a picture of it - sent it to the Surgeon who rx'ed abx but her pharmacy was closed.  Came in tonight due to worsening pain.  Pt notes some yellow drainage.  Did not take anything for pain.  L surgical site appears infected.  [facility] - Dr ervin.  Rx abx, check labs, give fluids and pain meds, will reach out to plastic surgeon - CDU for IV abx and plastics consult.   VS:  unremarkable    GEN - NAD; malaise; A+O x3   HEAD - NC/AT     ENT - PEERL, EOMI, mucous membranes  moist , no discharge    R ear TM clear.  L ear linear linear ?abrasion to TM.  No fluid or pus discharge.    NECK: Neck supple, non-tender without lymphadenopathy, no masses, no JVD  PULM - CTA b/l,  symmetric breath sounds  Bilat breasts - R breast surgical incision - mild redness, no dehiscence.  L breast surg incision, small amounts of yellow fluid under incision, redness and swelling surrounding.  No active drainage.    COR -  normal heart sounds    ABD - , ND, NT, soft, no guarding, no rebound, no masses    BACK - no CVA tenderness, nontender spine     EXTREMS - no edema, no deformity, warm and well perfused    SKIN - no rash or bruising      NEUROLOGIC - alert, CN 2-12 intact, sensation nl, motor 5/5 RUE/LUE/RLE/LLE.

## 2018-05-26 NOTE — ED CDU PROVIDER INITIAL DAY NOTE - NSTIMEPROVIDERCAREINITIATE_GEN_ER
PATIENT INSTRUCTIONS  From the office of Adilson Garcia MD, FACS  GENERAL, BREAST, and LAPAROSCOPIC SURGERY  [place]  [id number] [address] [phone]  Nurse contact: Sven Dorado RN      PROCEDURE:  · Gallbladder removal.    WOUND CARE:  · The stitches are underneath the skin and do not need to be removed.  · The incisions are covered with glue that will fall off on its own in 1-2 weeks.  · You may shower the next day after surgery per your usual routine with normal soap and water.  · Do not soak for 2 weeks (no hot tubs, baths, swimming, etc).  · There is no need to redress the wound unless there is drainage or if clothing is irritating it.  · You can use ice as needed for comfort.  Be sure to place something between the ice and your skin.  You can keep the ice on for about 20 minutes and then remove it for at least 20 minutes.  · It is not uncommon for there to be a 'healing ridge' under the incision.  This is normal and due to the swelling and inflammation from the surgery.  It will soften with time, beginning about 3-4 weeks after the surgery.       ACTIVITY:  · You can gradually resume your regular activities.  Even though you feel tired, it is important to be up and about.  Lying around too much increases your risk of pneumonia or blood clots that can go to your lungs.  Moving around the house or getting out for brief periods is helpful.   · It is okay for you to walk up and down stairs in your house.  · You can resume light exercise like walking as you feel able.    · Do not do any aggressive cardiovascular work-outs or weight lifting until 4 weeks after your surgery.  · Remember that you will fatigue more easily for some time, so be sure to rest when needed.  · No lifting more than 20 pounds for 2 weeks.  · Do not drive if you are taking prescription pain medications during the day.  When you feel ready to drive, please go to a parking lot with someone and practice driving to confirm that you can  maneuver your car safely before driving on the road.     DIET:  · You may resume your regular diet as tolerated.  You may find that lighter foods are easier on your stomach for the first few days.  · The anesthetic and narcotic pain medications can cause constipation.  You should use a stool softener for as long as you are taking any narcotic pain medication.  You may have received a prescription for Colace/Docusate 100 mg by mouth twice daily.  This medication is also available over the counter.   · If you are still struggling with constipation, you can try milk of magnesia.  This is available over the counter and should be taken according to the instructions on the bottle.    PAIN CONTROL:  · Remember that the first 2-4 days are usually the worst in terms of post-procedure pain.  You should see improvement in your pain daily.  · You have been given a prescription for a narcotic pain medication.  Take this medication as directed on the bottle as needed basis for pain.  Taking more than prescribed can result in toxicity, especially from the tylenol component.  · You can also take motrin or ibuprofen 600 mg by mouth every six hours as needed for pain.  This should be taken with food.  You can take motrin/ibuprofen while using the narcotic prescription medication.  It is sometimes helpful to alternate the medications.      WARNINGS:  · Call the clinic with any concerns about your wounds, fevers greater than 101.0 degrees, chills, persistent nausea or vomiting, worsening pain, or any other issues.  During regular hours, a message will be put through to my nurse and she will return your call.  After hours, please ask to have me paged.  Do not hesitate to call anytime with concerns.     FOLLOW-UP:  · Dr. Garcia as scheduled.      ~~~~ Please follow-up sooner if your symptoms or problems persist, worsen, or recur or if you have any issues or concerns. ~~~~     Care After Anesthesia or Sedation    After Discharge  · Due to  the medicine given, someone must drive you home.  If possible, have someone stay with you at home the day of discharge and the night after surgery.  · If you have infants or small children at home, please have someone help you for at least 24 hours after your surgery.  · Do not drive for at least 24 hours after surgery (or as told by your doctor).  · Rest for the remainder of the day. Go up and down stairs slowly.  · Do not smoke after surgery.  Smoking can delay healing.  · Do not operate heavy or potential harmful equipment.  · Do not make legally binding decisions.  · Do no drink alcohol for 24 hours.    Diet  · Nausea may be expected for the first 24 to 48 hours.  Start eating a bland diet (toast, gelatin, 7-up, hot cereal, crackers, sherbet, broth, soup).  · Drink plenty of fluids (6 to 8 glasses of water).  · Resume your regular diet as able.  · Avoid greasy or spicy foods for 24 hours.    Urination  · The effects of anesthetics may cause some people to have trouble passing urine the day of surgery.  Drink a lot of fluids to help prevent this.  · Try to urinate within 12 hours of surgery.  · If you are unable to pass urine and feel like you need to, call your doctor or the hospital.    Pain Control  · If your incision was injected with a long acting local anesthetic, it will wear off in 4 to 6 hours.  You can expect to have some pain at this time.  · Treat your pain with the prescribed pain medicine before it wears off.  Do not wait until your pain becomes severe.    · Ask your nurse when you had your last pain medicine, so you know when you can take another one after you get home.    · Your last pain medicine was given at _________.    Call your doctor if you have:    · Nausea and vomiting that does not stop  · Fever over 101 degrees F.  · Pain not relieved by pain medication  · If you feel you have to pass urine and you are not able to do so.  · Unusual changes in behavior  · Dizziness  · Hives     If you  are not able to reach your doctor, you may call the emergency department.       26-May-2018 01:03

## 2018-05-26 NOTE — ED PROVIDER NOTE - PHYSICAL EXAMINATION
VS:  unremarkable    GEN - NAD; malaise; A+O x3   HEAD - NC/AT     ENT - PEERL, EOMI, mucous membranes  moist , no discharge    R ear TM clear.  L ear TM redness no effusion  No fluid or pus discharge.    NECK: Neck supple, non-tender without lymphadenopathy, no masses, no JVD  PULM - CTA b/l,  symmetric breath sounds  Bilat breasts - R breast surgical incision - mild redness, no dehiscence.  L breast surg incision, small amounts of yellow fluid under incision, redness and swelling surrounding.  No active drainage.    COR -  normal heart sounds    ABD - , ND, NT, soft, no guarding, no rebound, no masses    BACK - no CVA tenderness, nontender spine     EXTREMS - no edema, no deformity, warm and well perfused    SKIN - no rash or bruising      NEUROLOGIC - alert, CN 2-12 intact, sensation nl, motor 5/5 RUE/LUE/RLE/LLE.

## 2018-05-26 NOTE — ED CDU PROVIDER INITIAL DAY NOTE - OBJECTIVE STATEMENT
Pt is 26 y/o female with no significant PMHx here for eval of LT breast pain and redness. Pt underwent breast reduction on April 26th by Dr Hogan in [place], followed up with him for wound care, but also went to the "post-op spa" in [place] on 5/22 and had suction cups applied over the wound, shortly after noticed redness and pain to the area followed by painful blisters.  Saw Dr Hogan on 5/24, rxed abx but haven't  taken them as the pharmacy was closed already. Pt in the ER for worsening of pain, Denies fever, chills, denies erythematous streaking; Plastics was contacted by ER, pending eval.

## 2020-06-26 ENCOUNTER — APPOINTMENT (OUTPATIENT)
Dept: ANTEPARTUM | Facility: CLINIC | Age: 30
End: 2020-06-26

## 2020-06-26 ENCOUNTER — APPOINTMENT (OUTPATIENT)
Dept: OBGYN | Facility: CLINIC | Age: 30
End: 2020-06-26

## 2020-06-27 ENCOUNTER — TRANSCRIPTION ENCOUNTER (OUTPATIENT)
Age: 30
End: 2020-06-27

## 2020-06-27 ENCOUNTER — EMERGENCY (EMERGENCY)
Facility: HOSPITAL | Age: 30
LOS: 1 days | Discharge: HOME | End: 2020-06-27
Attending: EMERGENCY MEDICINE | Admitting: EMERGENCY MEDICINE
Payer: MEDICAID

## 2020-06-27 VITALS
DIASTOLIC BLOOD PRESSURE: 94 MMHG | SYSTOLIC BLOOD PRESSURE: 142 MMHG | RESPIRATION RATE: 18 BRPM | OXYGEN SATURATION: 100 % | TEMPERATURE: 99 F | HEART RATE: 72 BPM

## 2020-06-27 DIAGNOSIS — Q21.1 ATRIAL SEPTAL DEFECT: Chronic | ICD-10-CM

## 2020-06-27 DIAGNOSIS — O14.90 UNSPECIFIED PRE-ECLAMPSIA, UNSPECIFIED TRIMESTER: Chronic | ICD-10-CM

## 2020-06-27 DIAGNOSIS — K46.9 UNSPECIFIED ABDOMINAL HERNIA WITHOUT OBSTRUCTION OR GANGRENE: Chronic | ICD-10-CM

## 2020-06-27 DIAGNOSIS — Z98.89 OTHER SPECIFIED POSTPROCEDURAL STATES: Chronic | ICD-10-CM

## 2020-06-27 LAB
ALBUMIN SERPL ELPH-MCNC: 3.9 G/DL — SIGNIFICANT CHANGE UP (ref 3.3–5)
ALP SERPL-CCNC: 95 U/L — SIGNIFICANT CHANGE UP (ref 40–120)
ALT FLD-CCNC: 14 U/L — SIGNIFICANT CHANGE UP (ref 4–33)
AMORPH CRY # UR COMP ASSIST: SIGNIFICANT CHANGE UP (ref 0–0)
ANION GAP SERPL CALC-SCNC: 12 MMO/L — SIGNIFICANT CHANGE UP (ref 7–14)
APPEARANCE UR: SIGNIFICANT CHANGE UP
AST SERPL-CCNC: 12 U/L — SIGNIFICANT CHANGE UP (ref 4–32)
BACTERIA # UR AUTO: HIGH
BASE EXCESS BLDV CALC-SCNC: 2.6 MMOL/L — SIGNIFICANT CHANGE UP
BASOPHILS # BLD AUTO: 0.06 K/UL — SIGNIFICANT CHANGE UP (ref 0–0.2)
BASOPHILS NFR BLD AUTO: 0.5 % — SIGNIFICANT CHANGE UP (ref 0–2)
BILIRUB SERPL-MCNC: < 0.2 MG/DL — LOW (ref 0.2–1.2)
BILIRUB UR-MCNC: NEGATIVE — SIGNIFICANT CHANGE UP
BLD GP AB SCN SERPL QL: NEGATIVE — SIGNIFICANT CHANGE UP
BLOOD GAS VENOUS - CREATININE: 0.63 MG/DL — SIGNIFICANT CHANGE UP (ref 0.5–1.3)
BLOOD UR QL VISUAL: NEGATIVE — SIGNIFICANT CHANGE UP
BUN SERPL-MCNC: 6 MG/DL — LOW (ref 7–23)
CALCIUM SERPL-MCNC: 9.5 MG/DL — SIGNIFICANT CHANGE UP (ref 8.4–10.5)
CHLORIDE BLDV-SCNC: 105 MMOL/L — SIGNIFICANT CHANGE UP (ref 96–108)
CHLORIDE SERPL-SCNC: 101 MMOL/L — SIGNIFICANT CHANGE UP (ref 98–107)
CO2 SERPL-SCNC: 24 MMOL/L — SIGNIFICANT CHANGE UP (ref 22–31)
COD CRY URNS QL: SIGNIFICANT CHANGE UP (ref 0–0)
COLOR SPEC: YELLOW — SIGNIFICANT CHANGE UP
CREAT SERPL-MCNC: 0.64 MG/DL — SIGNIFICANT CHANGE UP (ref 0.5–1.3)
EOSINOPHIL # BLD AUTO: 0.3 K/UL — SIGNIFICANT CHANGE UP (ref 0–0.5)
EOSINOPHIL NFR BLD AUTO: 2.4 % — SIGNIFICANT CHANGE UP (ref 0–6)
GAS PNL BLDV: 135 MMOL/L — LOW (ref 136–146)
GLUCOSE BLDV-MCNC: 80 MG/DL — SIGNIFICANT CHANGE UP (ref 70–99)
GLUCOSE SERPL-MCNC: 79 MG/DL — SIGNIFICANT CHANGE UP (ref 70–99)
GLUCOSE UR-MCNC: NEGATIVE — SIGNIFICANT CHANGE UP
HCG SERPL-ACNC: SIGNIFICANT CHANGE UP MIU/ML
HCO3 BLDV-SCNC: 24 MMOL/L — SIGNIFICANT CHANGE UP (ref 20–27)
HCT VFR BLD CALC: 39.7 % — SIGNIFICANT CHANGE UP (ref 34.5–45)
HCT VFR BLDV CALC: 44.2 % — SIGNIFICANT CHANGE UP (ref 34.5–45)
HGB BLD-MCNC: 13.7 G/DL — SIGNIFICANT CHANGE UP (ref 11.5–15.5)
HGB BLDV-MCNC: 14.4 G/DL — SIGNIFICANT CHANGE UP (ref 11.5–15.5)
IMM GRANULOCYTES NFR BLD AUTO: 0.4 % — SIGNIFICANT CHANGE UP (ref 0–1.5)
KETONES UR-MCNC: NEGATIVE — SIGNIFICANT CHANGE UP
LACTATE BLDV-MCNC: 1 MMOL/L — SIGNIFICANT CHANGE UP (ref 0.5–2)
LEUKOCYTE ESTERASE UR-ACNC: NEGATIVE — SIGNIFICANT CHANGE UP
LYMPHOCYTES # BLD AUTO: 19.3 % — SIGNIFICANT CHANGE UP (ref 13–44)
LYMPHOCYTES # BLD AUTO: 2.4 K/UL — SIGNIFICANT CHANGE UP (ref 1–3.3)
MCHC RBC-ENTMCNC: 30.8 PG — SIGNIFICANT CHANGE UP (ref 27–34)
MCHC RBC-ENTMCNC: 34.5 % — SIGNIFICANT CHANGE UP (ref 32–36)
MCV RBC AUTO: 89.2 FL — SIGNIFICANT CHANGE UP (ref 80–100)
MONOCYTES # BLD AUTO: 0.65 K/UL — SIGNIFICANT CHANGE UP (ref 0–0.9)
MONOCYTES NFR BLD AUTO: 5.2 % — SIGNIFICANT CHANGE UP (ref 2–14)
NEUTROPHILS # BLD AUTO: 8.97 K/UL — HIGH (ref 1.8–7.4)
NEUTROPHILS NFR BLD AUTO: 72.2 % — SIGNIFICANT CHANGE UP (ref 43–77)
NITRITE UR-MCNC: NEGATIVE — SIGNIFICANT CHANGE UP
NRBC # FLD: 0 K/UL — SIGNIFICANT CHANGE UP (ref 0–0)
PCO2 BLDV: 53 MMHG — HIGH (ref 41–51)
PH BLDV: 7.34 PH — SIGNIFICANT CHANGE UP (ref 7.32–7.43)
PH UR: 6.5 — SIGNIFICANT CHANGE UP (ref 5–8)
PLATELET # BLD AUTO: 393 K/UL — SIGNIFICANT CHANGE UP (ref 150–400)
PMV BLD: 9.4 FL — SIGNIFICANT CHANGE UP (ref 7–13)
PO2 BLDV: < 24 MMHG — LOW (ref 35–40)
POTASSIUM BLDV-SCNC: 3.9 MMOL/L — SIGNIFICANT CHANGE UP (ref 3.4–4.5)
POTASSIUM SERPL-MCNC: 4.3 MMOL/L — SIGNIFICANT CHANGE UP (ref 3.5–5.3)
POTASSIUM SERPL-SCNC: 4.3 MMOL/L — SIGNIFICANT CHANGE UP (ref 3.5–5.3)
PROT SERPL-MCNC: 7.2 G/DL — SIGNIFICANT CHANGE UP (ref 6–8.3)
PROT UR-MCNC: 30 — SIGNIFICANT CHANGE UP
RBC # BLD: 4.45 M/UL — SIGNIFICANT CHANGE UP (ref 3.8–5.2)
RBC # FLD: 12.3 % — SIGNIFICANT CHANGE UP (ref 10.3–14.5)
RBC CASTS # UR COMP ASSIST: SIGNIFICANT CHANGE UP (ref 0–?)
RH IG SCN BLD-IMP: POSITIVE — SIGNIFICANT CHANGE UP
SAO2 % BLDV: 25 % — LOW (ref 60–85)
SODIUM SERPL-SCNC: 137 MMOL/L — SIGNIFICANT CHANGE UP (ref 135–145)
SP GR SPEC: 1.03 — SIGNIFICANT CHANGE UP (ref 1–1.04)
SQUAMOUS # UR AUTO: SIGNIFICANT CHANGE UP
UROBILINOGEN FLD QL: SIGNIFICANT CHANGE UP
WBC # BLD: 12.43 K/UL — HIGH (ref 3.8–10.5)
WBC # FLD AUTO: 12.43 K/UL — HIGH (ref 3.8–10.5)
WBC UR QL: SIGNIFICANT CHANGE UP (ref 0–?)

## 2020-06-27 PROCEDURE — 76830 TRANSVAGINAL US NON-OB: CPT | Mod: 26

## 2020-06-27 PROCEDURE — 99284 EMERGENCY DEPT VISIT MOD MDM: CPT

## 2020-06-27 RX ORDER — SODIUM CHLORIDE 9 MG/ML
1000 INJECTION INTRAMUSCULAR; INTRAVENOUS; SUBCUTANEOUS ONCE
Refills: 0 | Status: COMPLETED | OUTPATIENT
Start: 2020-06-27 | End: 2020-06-27

## 2020-06-27 RX ADMIN — SODIUM CHLORIDE 1000 MILLILITER(S): 9 INJECTION INTRAMUSCULAR; INTRAVENOUS; SUBCUTANEOUS at 13:11

## 2020-06-27 NOTE — ED PROVIDER NOTE - OBJECTIVE STATEMENT
30 Y/O F  (H/O 1 elective termination) with a H/O gestational DM and Preclampsia, ASD that was repaired in , , states that her LMP was 2020 states that she has been having brown diarrhea for the past 2 days which is watery and will range from brown to yellow. Pt denies recent travel, antibiotics, hospitalization or sick contacts. Pt states that she is not nauseous and did not vomit and denies fever/chills/nightsweats.  Pt denies ABD pn aside from a crampy sensation in her pelvis. Pt states the cramping feels 3/10 and is achy. Denies vaginal bleeding or discharge. Pt denies any other acute sx or complaints, states that the current IUP has not yet been confirmed. Pt states that her first GYN appointment is this upcoming Tuesday. 30 Y/O F  (H/O 1 elective termination) with a H/O gestational DM and Preeclampsia, ASD that was repaired in , , states that her LMP was 2020 states that she has been having brown diarrhea for the past 2 days which is watery and will range from brown to yellow. Pt denies recent travel, antibiotics, hospitalization or sick contacts. Pt states that she is not nauseous and did not vomit and denies fever/chills/nightsweats.  Pt denies ABD pn aside from a crampy sensation in her pelvis. Pt states the cramping feels 3/10 and is achy. Denies vaginal bleeding or discharge. Pt denies any other acute sx or complaints, states that the current IUP has not yet been confirmed. Pt states that her first GYN appointment is this upcoming Tuesday.  Attending - Agree with above.  I evaluated patient myself. 28 y/o F  with previous pregnancy complications as noted (DM, preeclampsia).  Reports is currently 9-10 weeks pregnant.  LMP .  No PNC yet for this pregnancy.  To ED c/o diarrhea x 2 days.  Multiple episodes of watery stool, no blood.  Periodic lower abd cramping sensation.  Denies abd pain.  No CP, SOB, covid symptoms.  No n/v.  No lightheadedness.

## 2020-06-27 NOTE — ED PROVIDER NOTE - PHYSICAL EXAMINATION
ATTENDING PHYSICAL EXAM  GEN - NAD; well appearing; A+O x3  HEAD - NC/AT; EYES/NOSE - PERRL, EOMI, mucous membranes moist  NECK: Neck supple, non-tender without lymphadenopathy, no masses, no JVD  PULMONARY - CTA b/l, symmetric breath sounds  CARDIAC -s1s2, RRR, no M,R,G  ABDOMEN - +BS, ND, NT, soft, no guarding, no rebound, no masses   BACK - no CVA tenderness, No vertebral or paravertebral tenderness  EXTREMITIES - symmetric pulses, 2+ dp, capillary refill < 2 seconds, no clubbing, no cyanosis, no edema  SKIN - no rash or bruising   NEUROLOGIC - alert, CN 2-12 intact

## 2020-06-27 NOTE — ED PROVIDER NOTE - NSFOLLOWUPINSTRUCTIONS_ED_ALL_ED_FT
Follow up with your OBGYN as scheduled this Tuesday.  Advance activity as tolerated.  Continue all previously prescribed medications as directed.  Follow up with your primary care physician in 48-72 hours- bring copies of your results.  Return to the ER for worsening or persistent symptoms, and/or ANY NEW OR CONCERNING SYMPTOMS. This includes but is not limited to increasing or persistent pain, fever, chills, nightsweats or any other symptoms that concern you. If you have issues obtaining follow up, please call: [phone] to obtain a doctor or specialist who takes your insurance in your area.  You may call [phone] to make an appointment with the internal medicine clinic.

## 2020-06-27 NOTE — ED PROVIDER NOTE - PATIENT PORTAL LINK FT
You can access the FollowMyHealth Patient Portal offered by [facility] by registering at the following website: [email or web address]. By joining [facility]’s FollowMyHealth portal, you will also be able to view your health information using other applications (apps) compatible with our system.

## 2020-06-27 NOTE — ED ADULT NURSE NOTE - OBJECTIVE STATEMENT
Pt. A&Ox4, states she's approx. 9wks pregnant. Pt. c/o loss of symptoms including breast tenderness, abdominal bloating and nausea that she'd been having x a few weeks. Pt. now having diarrhea x 2 days. Denies vaginal bleeding or abdominal cramping. , with h/o pre-eclampsia and gestational diabetes. h/o 2 c-sections. #20g IV placed to right AC, labs sent. MD at bedside for eval. VSS, breathing well on RA. Will continue to monitor.

## 2020-06-27 NOTE — ED PROVIDER NOTE - CLINICAL SUMMARY MEDICAL DECISION MAKING FREE TEXT BOX
30 Y/O F  (H/O 1 elective termination) with a H/O gestational DM and Preclampsia, ASD that was repaired in , , states that her LMP was 2020 states that she has been having brown diarrhea for the past 2 days which is watery and will range from brown to yellow. Pt denies recent travel, antibiotics, hospitalization or sick contacts. Will check labs to eval for anemia or electrolyte disturbance, will check UC to confrirm IUP, pt is well appearing and is non-tender on abdominal exam.

## 2020-06-27 NOTE — ED ADULT TRIAGE NOTE - CHIEF COMPLAINT QUOTE
pt. approximately 9-10 weeks pregnant, c/o abd cramping and diarrhea x 2 days after stopping her prenatal pills. PMHx gestational DM, HTN. LMP 04/24/20.

## 2020-06-28 LAB
CULTURE RESULTS: SIGNIFICANT CHANGE UP
SPECIMEN SOURCE: SIGNIFICANT CHANGE UP

## 2020-07-23 ENCOUNTER — LABORATORY RESULT (OUTPATIENT)
Age: 30
End: 2020-07-23

## 2020-07-23 ENCOUNTER — APPOINTMENT (OUTPATIENT)
Dept: OBGYN | Facility: HOSPITAL | Age: 30
End: 2020-07-23

## 2020-07-23 ENCOUNTER — ASOB RESULT (OUTPATIENT)
Age: 30
End: 2020-07-23

## 2020-07-23 ENCOUNTER — RESULT REVIEW (OUTPATIENT)
Age: 30
End: 2020-07-23

## 2020-07-23 ENCOUNTER — OUTPATIENT (OUTPATIENT)
Dept: OUTPATIENT SERVICES | Facility: HOSPITAL | Age: 30
LOS: 1 days | End: 2020-07-23
Payer: MEDICAID

## 2020-07-23 ENCOUNTER — APPOINTMENT (OUTPATIENT)
Dept: ANTEPARTUM | Facility: CLINIC | Age: 30
End: 2020-07-23
Payer: MEDICAID

## 2020-07-23 ENCOUNTER — NON-APPOINTMENT (OUTPATIENT)
Age: 30
End: 2020-07-23

## 2020-07-23 VITALS
DIASTOLIC BLOOD PRESSURE: 80 MMHG | BODY MASS INDEX: 31.71 KG/M2 | WEIGHT: 202 LBS | SYSTOLIC BLOOD PRESSURE: 114 MMHG | HEART RATE: 76 BPM | HEIGHT: 67 IN

## 2020-07-23 DIAGNOSIS — Z83.438 FAMILY HISTORY OF OTHER DISORDER OF LIPOPROTEIN METABOLISM AND OTHER LIPIDEMIA: ICD-10-CM

## 2020-07-23 DIAGNOSIS — Z82.49 FAMILY HISTORY OF ISCHEMIC HEART DISEASE AND OTHER DISEASES OF THE CIRCULATORY SYSTEM: ICD-10-CM

## 2020-07-23 DIAGNOSIS — Z00.00 ENCOUNTER FOR GENERAL ADULT MEDICAL EXAMINATION W/OUT ABNORMAL FINDINGS: ICD-10-CM

## 2020-07-23 DIAGNOSIS — Z34.90 ENCOUNTER FOR SUPERVISION OF NORMAL PREGNANCY, UNSPECIFIED, UNSPECIFIED TRIMESTER: ICD-10-CM

## 2020-07-23 DIAGNOSIS — Z82.5 FAMILY HISTORY OF ASTHMA AND OTHER CHRONIC LOWER RESPIRATORY DISEASES: ICD-10-CM

## 2020-07-23 DIAGNOSIS — K46.9 UNSPECIFIED ABDOMINAL HERNIA WITHOUT OBSTRUCTION OR GANGRENE: Chronic | ICD-10-CM

## 2020-07-23 DIAGNOSIS — Z82.79 FAMILY HISTORY OF OTHER CONGENITAL MALFORMATIONS, DEFORMATIONS AND CHROMOSOMAL ABNORMALITIES: ICD-10-CM

## 2020-07-23 DIAGNOSIS — O14.90 UNSPECIFIED PRE-ECLAMPSIA, UNSPECIFIED TRIMESTER: Chronic | ICD-10-CM

## 2020-07-23 DIAGNOSIS — Z34.80 ENCOUNTER FOR SUPERVISION OF OTHER NORMAL PREGNANCY, UNSPECIFIED TRIMESTER: ICD-10-CM

## 2020-07-23 DIAGNOSIS — O14.03 MILD TO MODERATE PRE-ECLAMPSIA, THIRD TRIMESTER: ICD-10-CM

## 2020-07-23 DIAGNOSIS — Z78.9 OTHER SPECIFIED HEALTH STATUS: ICD-10-CM

## 2020-07-23 DIAGNOSIS — Z98.89 OTHER SPECIFIED POSTPROCEDURAL STATES: Chronic | ICD-10-CM

## 2020-07-23 DIAGNOSIS — Z80.7 FAMILY HISTORY OF OTHER MALIGNANT NEOPLASMS OF LYMPHOID, HEMATOPOIETIC AND RELATED TISSUES: ICD-10-CM

## 2020-07-23 DIAGNOSIS — O24.410 GESTATIONAL DIABETES MELLITUS IN PREGNANCY, DIET CONTROLLED: ICD-10-CM

## 2020-07-23 DIAGNOSIS — Q21.1 ATRIAL SEPTAL DEFECT: Chronic | ICD-10-CM

## 2020-07-23 LAB
ALBUMIN SERPL ELPH-MCNC: 4.2 G/DL — SIGNIFICANT CHANGE UP (ref 3.3–5)
ALP SERPL-CCNC: 104 U/L — SIGNIFICANT CHANGE UP (ref 40–120)
ALT FLD-CCNC: 45 U/L — HIGH (ref 4–33)
ANION GAP SERPL CALC-SCNC: 14 MMO/L — SIGNIFICANT CHANGE UP (ref 7–14)
APPEARANCE UR: CLEAR — SIGNIFICANT CHANGE UP
AST SERPL-CCNC: 35 U/L — HIGH (ref 4–32)
BACTERIA # UR AUTO: SIGNIFICANT CHANGE UP
BASOPHILS # BLD AUTO: 0.06 K/UL — SIGNIFICANT CHANGE UP (ref 0–0.2)
BASOPHILS NFR BLD AUTO: 0.5 % — SIGNIFICANT CHANGE UP (ref 0–2)
BILIRUB SERPL-MCNC: 0.2 MG/DL — SIGNIFICANT CHANGE UP (ref 0.2–1.2)
BILIRUB UR-MCNC: NEGATIVE — SIGNIFICANT CHANGE UP
BLD GP AB SCN SERPL QL: NEGATIVE — SIGNIFICANT CHANGE UP
BLOOD UR QL VISUAL: NEGATIVE — SIGNIFICANT CHANGE UP
BUN SERPL-MCNC: 6 MG/DL — LOW (ref 7–23)
CALCIUM SERPL-MCNC: 9.8 MG/DL — SIGNIFICANT CHANGE UP (ref 8.4–10.5)
CHLORIDE SERPL-SCNC: 98 MMOL/L — SIGNIFICANT CHANGE UP (ref 98–107)
CO2 SERPL-SCNC: 25 MMOL/L — SIGNIFICANT CHANGE UP (ref 22–31)
COLOR SPEC: YELLOW — SIGNIFICANT CHANGE UP
CREAT SERPL-MCNC: 0.68 MG/DL — SIGNIFICANT CHANGE UP (ref 0.5–1.3)
EOSINOPHIL # BLD AUTO: 0.21 K/UL — SIGNIFICANT CHANGE UP (ref 0–0.5)
EOSINOPHIL NFR BLD AUTO: 1.7 % — SIGNIFICANT CHANGE UP (ref 0–6)
GLUCOSE SERPL-MCNC: 76 MG/DL — SIGNIFICANT CHANGE UP (ref 70–99)
GLUCOSE UR-MCNC: NEGATIVE — SIGNIFICANT CHANGE UP
HBA1C BLD-MCNC: 5.6 % — SIGNIFICANT CHANGE UP (ref 4–5.6)
HCT VFR BLD CALC: 40.4 % — SIGNIFICANT CHANGE UP (ref 34.5–45)
HGB BLD-MCNC: 14 G/DL — SIGNIFICANT CHANGE UP (ref 11.5–15.5)
HIV COMBO RESULT: SIGNIFICANT CHANGE UP
HIV1+2 AB SPEC QL: SIGNIFICANT CHANGE UP
HYALINE CASTS # UR AUTO: NEGATIVE — SIGNIFICANT CHANGE UP
IMM GRANULOCYTES NFR BLD AUTO: 0.5 % — SIGNIFICANT CHANGE UP (ref 0–1.5)
KETONES UR-MCNC: NEGATIVE — SIGNIFICANT CHANGE UP
LEUKOCYTE ESTERASE UR-ACNC: NEGATIVE — SIGNIFICANT CHANGE UP
LYMPHOCYTES # BLD AUTO: 18.9 % — SIGNIFICANT CHANGE UP (ref 13–44)
LYMPHOCYTES # BLD AUTO: 2.37 K/UL — SIGNIFICANT CHANGE UP (ref 1–3.3)
MCHC RBC-ENTMCNC: 30.8 PG — SIGNIFICANT CHANGE UP (ref 27–34)
MCHC RBC-ENTMCNC: 34.7 % — SIGNIFICANT CHANGE UP (ref 32–36)
MCV RBC AUTO: 88.8 FL — SIGNIFICANT CHANGE UP (ref 80–100)
MONOCYTES # BLD AUTO: 0.46 K/UL — SIGNIFICANT CHANGE UP (ref 0–0.9)
MONOCYTES NFR BLD AUTO: 3.7 % — SIGNIFICANT CHANGE UP (ref 2–14)
NEUTROPHILS # BLD AUTO: 9.36 K/UL — HIGH (ref 1.8–7.4)
NEUTROPHILS NFR BLD AUTO: 74.7 % — SIGNIFICANT CHANGE UP (ref 43–77)
NITRITE UR-MCNC: NEGATIVE — SIGNIFICANT CHANGE UP
NRBC # FLD: 0 K/UL — SIGNIFICANT CHANGE UP (ref 0–0)
PH UR: 7.5 — SIGNIFICANT CHANGE UP (ref 5–8)
PLATELET # BLD AUTO: 369 K/UL — SIGNIFICANT CHANGE UP (ref 150–400)
PMV BLD: 10.2 FL — SIGNIFICANT CHANGE UP (ref 7–13)
POTASSIUM SERPL-MCNC: 3.4 MMOL/L — LOW (ref 3.5–5.3)
POTASSIUM SERPL-SCNC: 3.4 MMOL/L — LOW (ref 3.5–5.3)
PROT SERPL-MCNC: 7.6 G/DL — SIGNIFICANT CHANGE UP (ref 6–8.3)
PROT UR-MCNC: 30 — SIGNIFICANT CHANGE UP
RBC # BLD: 4.55 M/UL — SIGNIFICANT CHANGE UP (ref 3.8–5.2)
RBC # FLD: 12.4 % — SIGNIFICANT CHANGE UP (ref 10.3–14.5)
RBC CASTS # UR COMP ASSIST: SIGNIFICANT CHANGE UP (ref 0–?)
RH IG SCN BLD-IMP: POSITIVE — SIGNIFICANT CHANGE UP
SODIUM SERPL-SCNC: 137 MMOL/L — SIGNIFICANT CHANGE UP (ref 135–145)
SP GR SPEC: 1.02 — SIGNIFICANT CHANGE UP (ref 1–1.04)
SQUAMOUS # UR AUTO: SIGNIFICANT CHANGE UP
T4 FREE SERPL-MCNC: 1.21 NG/DL — SIGNIFICANT CHANGE UP (ref 0.9–1.8)
TSH SERPL-MCNC: 0.76 UIU/ML — SIGNIFICANT CHANGE UP (ref 0.27–4.2)
URATE SERPL-MCNC: 3.1 MG/DL — SIGNIFICANT CHANGE UP (ref 2.5–7)
UROBILINOGEN FLD QL: NORMAL — SIGNIFICANT CHANGE UP
WBC # BLD: 12.52 K/UL — HIGH (ref 3.8–10.5)
WBC # FLD AUTO: 12.52 K/UL — HIGH (ref 3.8–10.5)
WBC UR QL: SIGNIFICANT CHANGE UP (ref 0–?)

## 2020-07-23 PROCEDURE — 76801 OB US < 14 WKS SINGLE FETUS: CPT | Mod: 26

## 2020-07-23 PROCEDURE — 76813 OB US NUCHAL MEAS 1 GEST: CPT | Mod: 26

## 2020-07-23 PROCEDURE — G0452: CPT | Mod: 26

## 2020-07-24 LAB
24R-OH-CALCIDIOL SERPL-MCNC: 29.1 NG/ML — LOW (ref 30–80)
HBV SURFACE AG SER-ACNC: NONREACTIVE — SIGNIFICANT CHANGE UP
HCV AB S/CO SERPL IA: 0.07 S/CO — SIGNIFICANT CHANGE UP (ref 0–0.99)
HCV AB SERPL-IMP: SIGNIFICANT CHANGE UP
HGB A MFR BLD: 97 % — SIGNIFICANT CHANGE UP
HGB A2 MFR BLD: 2.7 % — SIGNIFICANT CHANGE UP (ref 2.4–3.5)
HGB ELECT COMMENT: SIGNIFICANT CHANGE UP
HGB F MFR BLD: < 1 % — SIGNIFICANT CHANGE UP (ref 0–1.5)
RUBV IGG SER-ACNC: 1.7 INDEX — SIGNIFICANT CHANGE UP
RUBV IGG SER-IMP: POSITIVE — SIGNIFICANT CHANGE UP
SARS-COV-2 IGG SERPL QL IA: NEGATIVE — SIGNIFICANT CHANGE UP
SARS-COV-2 IGM SERPL IA-ACNC: 0.08 INDEX — SIGNIFICANT CHANGE UP
T PALLIDUM AB TITR SER: NEGATIVE — SIGNIFICANT CHANGE UP
VZV IGG FLD QL IA: 630.4 INDEX — SIGNIFICANT CHANGE UP
VZV IGG FLD QL IA: POSITIVE — SIGNIFICANT CHANGE UP

## 2020-07-25 LAB
C TRACH RRNA SPEC QL NAA+PROBE: SIGNIFICANT CHANGE UP
CULTURE RESULTS: SIGNIFICANT CHANGE UP
GAMMA INTERFERON BACKGROUND BLD IA-ACNC: 0.01 IU/ML — SIGNIFICANT CHANGE UP
LEAD SERPL-MCNC: < 1 UG/DL — SIGNIFICANT CHANGE UP (ref 0–4)
M TB IFN-G BLD-IMP: NEGATIVE — SIGNIFICANT CHANGE UP
M TB IFN-G CD4+ BCKGRND COR BLD-ACNC: 0 IU/ML — SIGNIFICANT CHANGE UP
M TB IFN-G CD4+CD8+ BCKGRND COR BLD-ACNC: 0 IU/ML — SIGNIFICANT CHANGE UP
N GONORRHOEA RRNA SPEC QL NAA+PROBE: SIGNIFICANT CHANGE UP
QUANT TB PLUS MITOGEN MINUS NIL: 1.52 IU/ML — SIGNIFICANT CHANGE UP
SPECIMEN SOURCE: SIGNIFICANT CHANGE UP
SPECIMEN SOURCE: SIGNIFICANT CHANGE UP

## 2020-07-26 LAB — MEV IGM SER-ACNC: NEGATIVE — SIGNIFICANT CHANGE UP

## 2020-07-27 LAB — CYTOLOGY SPEC DOC CYTO: SIGNIFICANT CHANGE UP

## 2020-07-28 LAB
1ST TRIMESTER DATA: SIGNIFICANT CHANGE UP
ADDENDUM DOC: SIGNIFICANT CHANGE UP
AFP SERPL-ACNC: SIGNIFICANT CHANGE UP
B-HCG FREE SERPL-MCNC: SIGNIFICANT CHANGE UP
CLINICAL BIOCHEMIST REVIEW: SIGNIFICANT CHANGE UP
CLINICAL BIOCHEMIST REVIEW: SIGNIFICANT CHANGE UP
DEMOGRAPHIC DATA: SIGNIFICANT CHANGE UP
NT: SIGNIFICANT CHANGE UP
PAPP-A SERPL-ACNC: SIGNIFICANT CHANGE UP
SCREEN-FOOTER: SIGNIFICANT CHANGE UP
SCREEN-RECOMMENDATIONS: SIGNIFICANT CHANGE UP

## 2020-07-31 LAB — CFTR MUT ANL BLD/T: NEGATIVE — SIGNIFICANT CHANGE UP

## 2020-08-06 ENCOUNTER — RESULT REVIEW (OUTPATIENT)
Age: 30
End: 2020-08-06

## 2020-08-06 ENCOUNTER — APPOINTMENT (OUTPATIENT)
Dept: OBGYN | Facility: HOSPITAL | Age: 30
End: 2020-08-06

## 2020-08-06 ENCOUNTER — LABORATORY RESULT (OUTPATIENT)
Age: 30
End: 2020-08-06

## 2020-08-06 ENCOUNTER — NON-APPOINTMENT (OUTPATIENT)
Age: 30
End: 2020-08-06

## 2020-08-06 ENCOUNTER — OUTPATIENT (OUTPATIENT)
Dept: OUTPATIENT SERVICES | Facility: HOSPITAL | Age: 30
LOS: 1 days | End: 2020-08-06

## 2020-08-06 DIAGNOSIS — K46.9 UNSPECIFIED ABDOMINAL HERNIA WITHOUT OBSTRUCTION OR GANGRENE: Chronic | ICD-10-CM

## 2020-08-06 DIAGNOSIS — O14.90 UNSPECIFIED PRE-ECLAMPSIA, UNSPECIFIED TRIMESTER: Chronic | ICD-10-CM

## 2020-08-06 DIAGNOSIS — Z98.89 OTHER SPECIFIED POSTPROCEDURAL STATES: Chronic | ICD-10-CM

## 2020-08-06 DIAGNOSIS — Q21.1 ATRIAL SEPTAL DEFECT: Chronic | ICD-10-CM

## 2020-08-06 LAB
ALBUMIN SERPL ELPH-MCNC: 4 G/DL — SIGNIFICANT CHANGE UP (ref 3.3–5)
ALP SERPL-CCNC: 81 U/L — SIGNIFICANT CHANGE UP (ref 40–120)
ALT FLD-CCNC: 24 U/L — SIGNIFICANT CHANGE UP (ref 4–33)
AST SERPL-CCNC: 19 U/L — SIGNIFICANT CHANGE UP (ref 4–32)
BILIRUB DIRECT SERPL-MCNC: < 0.2 MG/DL — SIGNIFICANT CHANGE UP (ref 0.1–0.2)
BILIRUB SERPL-MCNC: < 0.2 MG/DL — LOW (ref 0.2–1.2)
PROT SERPL-MCNC: 7 G/DL — SIGNIFICANT CHANGE UP (ref 6–8.3)

## 2020-08-07 LAB
CULTURE RESULTS: SIGNIFICANT CHANGE UP
HAV IGM SER-ACNC: NONREACTIVE — SIGNIFICANT CHANGE UP
SPECIMEN SOURCE: SIGNIFICANT CHANGE UP

## 2020-08-10 ENCOUNTER — APPOINTMENT (OUTPATIENT)
Dept: CV DIAGNOSITCS | Facility: HOSPITAL | Age: 30
End: 2020-08-10
Payer: MEDICAID

## 2020-08-10 PROCEDURE — 93306 TTE W/DOPPLER COMPLETE: CPT | Mod: 26

## 2020-08-19 DIAGNOSIS — O99.419 DISEASES OF THE CIRCULATORY SYSTEM COMPLICATING PREGNANCY, UNSPECIFIED TRIMESTER: ICD-10-CM

## 2020-08-19 DIAGNOSIS — O09.299 SUPERVISION OF PREGNANCY WITH OTHER POOR REPRODUCTIVE OR OBSTETRIC HISTORY, UNSPECIFIED TRIMESTER: ICD-10-CM

## 2020-08-19 DIAGNOSIS — Z34.80 ENCOUNTER FOR SUPERVISION OF OTHER NORMAL PREGNANCY, UNSPECIFIED TRIMESTER: ICD-10-CM

## 2020-08-20 ENCOUNTER — NON-APPOINTMENT (OUTPATIENT)
Age: 30
End: 2020-08-20

## 2020-08-20 ENCOUNTER — APPOINTMENT (OUTPATIENT)
Dept: CARDIOLOGY | Facility: CLINIC | Age: 30
End: 2020-08-20
Payer: MEDICAID

## 2020-08-20 VITALS
BODY MASS INDEX: 32.96 KG/M2 | HEIGHT: 67 IN | OXYGEN SATURATION: 99 % | SYSTOLIC BLOOD PRESSURE: 125 MMHG | WEIGHT: 210 LBS | HEART RATE: 75 BPM | DIASTOLIC BLOOD PRESSURE: 82 MMHG | TEMPERATURE: 97.9 F

## 2020-08-20 PROCEDURE — 93000 ELECTROCARDIOGRAM COMPLETE: CPT

## 2020-08-20 PROCEDURE — 99214 OFFICE O/P EST MOD 30 MIN: CPT

## 2020-08-20 NOTE — DISCUSSION/SUMMARY
[FreeTextEntry1] : 29 year old woman with history of ASD closure and PEC here to establish care\par -TTE done on 8/10/2020 now normal\par -continue BP monitoring\par -fu in 6 weeks\par

## 2020-08-20 NOTE — HISTORY OF PRESENT ILLNESS
[FreeTextEntry1] : 29 year old woman with history of ASD repair as a child,  history of PEC.  who is 17 weeks pregnant here to establish care.\par She was induced with PEC with first pregnancy, had GDM with second pregnancy without PEC. \par Feels well. No complaints; No chest pain, dyspnea or edema. Never on meds\par -TTE done on 8/10/2020 : \par 1. Aortic valve not well visualized.\par 2. Normal left ventricular internal dimensions and wall\par thicknesses.\par 3. Normal left ventricular systolic function. No segmental\par wall motion abnormalities.\par 4. Normal right ventricular size and function.\par 5. Atrial septal closure device seen, no obvious\par interatrial color flow is seen.

## 2020-08-20 NOTE — PHYSICAL EXAM
[General Appearance - Well Developed] : well developed [Normal Appearance] : normal appearance [Well Groomed] : well groomed [General Appearance - Well Nourished] : well nourished [No Deformities] : no deformities [General Appearance - In No Acute Distress] : no acute distress [Normal Oral Mucosa] : normal oral mucosa [No Oral Pallor] : no oral pallor [No Oral Cyanosis] : no oral cyanosis [Normal Jugular Venous A Waves Present] : normal jugular venous A waves present [Normal Jugular Venous V Waves Present] : normal jugular venous V waves present [No Jugular Venous Kiser A Waves] : no jugular venous kiser A waves [Heart Rate And Rhythm] : heart rate and rhythm were normal [Heart Sounds] : normal S1 and S2 [Murmurs] : no murmurs present [Respiration, Rhythm And Depth] : normal respiratory rhythm and effort [Auscultation Breath Sounds / Voice Sounds] : lungs were clear to auscultation bilaterally [Exaggerated Use Of Accessory Muscles For Inspiration] : no accessory muscle use [Abdomen Soft] : soft [Abdomen Tenderness] : non-tender [] : no hepato-splenomegaly [Abnormal Walk] : normal gait [Abdomen Mass (___ Cm)] : no abdominal mass palpated [Gait - Sufficient For Exercise Testing] : the gait was sufficient for exercise testing

## 2020-09-02 DIAGNOSIS — N89.8 OTHER SPECIFIED NONINFLAMMATORY DISORDERS OF VAGINA: ICD-10-CM

## 2020-09-03 ENCOUNTER — APPOINTMENT (OUTPATIENT)
Dept: OBGYN | Facility: HOSPITAL | Age: 30
End: 2020-09-03

## 2020-09-04 ENCOUNTER — TRANSCRIPTION ENCOUNTER (OUTPATIENT)
Age: 30
End: 2020-09-04

## 2020-09-11 ENCOUNTER — LABORATORY RESULT (OUTPATIENT)
Age: 30
End: 2020-09-11

## 2020-09-11 ENCOUNTER — APPOINTMENT (OUTPATIENT)
Dept: ANTEPARTUM | Facility: CLINIC | Age: 30
End: 2020-09-11
Payer: MEDICAID

## 2020-09-11 ENCOUNTER — NON-APPOINTMENT (OUTPATIENT)
Age: 30
End: 2020-09-11

## 2020-09-11 ENCOUNTER — ASOB RESULT (OUTPATIENT)
Age: 30
End: 2020-09-11

## 2020-09-11 ENCOUNTER — OUTPATIENT (OUTPATIENT)
Dept: OUTPATIENT SERVICES | Facility: HOSPITAL | Age: 30
LOS: 1 days | End: 2020-09-11

## 2020-09-11 ENCOUNTER — APPOINTMENT (OUTPATIENT)
Dept: OBGYN | Facility: HOSPITAL | Age: 30
End: 2020-09-11

## 2020-09-11 VITALS
WEIGHT: 209 LBS | HEIGHT: 67 IN | DIASTOLIC BLOOD PRESSURE: 71 MMHG | HEART RATE: 66 BPM | TEMPERATURE: 98.5 F | BODY MASS INDEX: 32.8 KG/M2 | SYSTOLIC BLOOD PRESSURE: 120 MMHG

## 2020-09-11 DIAGNOSIS — O12.10 GESTATIONAL PROTEINURIA, UNSPECIFIED TRIMESTER: ICD-10-CM

## 2020-09-11 DIAGNOSIS — K46.9 UNSPECIFIED ABDOMINAL HERNIA WITHOUT OBSTRUCTION OR GANGRENE: Chronic | ICD-10-CM

## 2020-09-11 DIAGNOSIS — Z00.00 ENCOUNTER FOR GENERAL ADULT MEDICAL EXAMINATION WITHOUT ABNORMAL FINDINGS: ICD-10-CM

## 2020-09-11 DIAGNOSIS — Q21.1 ATRIAL SEPTAL DEFECT: Chronic | ICD-10-CM

## 2020-09-11 DIAGNOSIS — O14.90 UNSPECIFIED PRE-ECLAMPSIA, UNSPECIFIED TRIMESTER: Chronic | ICD-10-CM

## 2020-09-11 DIAGNOSIS — O09.299 SUPERVISION OF PREGNANCY WITH OTHER POOR REPRODUCTIVE OR OBSTETRIC HISTORY, UNSPECIFIED TRIMESTER: ICD-10-CM

## 2020-09-11 DIAGNOSIS — Z98.89 OTHER SPECIFIED POSTPROCEDURAL STATES: Chronic | ICD-10-CM

## 2020-09-11 LAB
ALBUMIN SERPL ELPH-MCNC: 3.6 G/DL — SIGNIFICANT CHANGE UP (ref 3.3–5)
ALP SERPL-CCNC: 95 U/L — SIGNIFICANT CHANGE UP (ref 40–120)
ALT FLD-CCNC: 22 U/L — SIGNIFICANT CHANGE UP (ref 4–33)
ANION GAP SERPL CALC-SCNC: 10 MMO/L — SIGNIFICANT CHANGE UP (ref 7–14)
AST SERPL-CCNC: 14 U/L — SIGNIFICANT CHANGE UP (ref 4–32)
BASOPHILS # BLD AUTO: 0.03 K/UL — SIGNIFICANT CHANGE UP (ref 0–0.2)
BASOPHILS NFR BLD AUTO: 0.2 % — SIGNIFICANT CHANGE UP (ref 0–2)
BILIRUB SERPL-MCNC: < 0.2 MG/DL — LOW (ref 0.2–1.2)
BUN SERPL-MCNC: 6 MG/DL — LOW (ref 7–23)
CALCIUM SERPL-MCNC: 9.5 MG/DL — SIGNIFICANT CHANGE UP (ref 8.4–10.5)
CHLORIDE SERPL-SCNC: 106 MMOL/L — SIGNIFICANT CHANGE UP (ref 98–107)
CO2 SERPL-SCNC: 25 MMOL/L — SIGNIFICANT CHANGE UP (ref 22–31)
CREAT SERPL-MCNC: 0.49 MG/DL — LOW (ref 0.5–1.3)
EOSINOPHIL # BLD AUTO: 0.18 K/UL — SIGNIFICANT CHANGE UP (ref 0–0.5)
EOSINOPHIL NFR BLD AUTO: 1.5 % — SIGNIFICANT CHANGE UP (ref 0–6)
GLUCOSE BLDC GLUCOMTR-MCNC: 93
GLUCOSE SERPL-MCNC: 70 MG/DL — SIGNIFICANT CHANGE UP (ref 70–99)
HCT VFR BLD CALC: 35.1 % — SIGNIFICANT CHANGE UP (ref 34.5–45)
HGB BLD-MCNC: 11.7 G/DL — SIGNIFICANT CHANGE UP (ref 11.5–15.5)
IMM GRANULOCYTES NFR BLD AUTO: 0.9 % — SIGNIFICANT CHANGE UP (ref 0–1.5)
INR BLD: 1 — SIGNIFICANT CHANGE UP (ref 0.88–1.16)
LDH SERPL L TO P-CCNC: 147 U/L — SIGNIFICANT CHANGE UP (ref 135–225)
LYMPHOCYTES # BLD AUTO: 1.76 K/UL — SIGNIFICANT CHANGE UP (ref 1–3.3)
LYMPHOCYTES # BLD AUTO: 14.3 % — SIGNIFICANT CHANGE UP (ref 13–44)
M PROTEIN 24H MFR UR ELPH: 435 MG/24 HR — SIGNIFICANT CHANGE UP
MCHC RBC-ENTMCNC: 29.7 PG — SIGNIFICANT CHANGE UP (ref 27–34)
MCHC RBC-ENTMCNC: 33.3 % — SIGNIFICANT CHANGE UP (ref 32–36)
MCV RBC AUTO: 89.1 FL — SIGNIFICANT CHANGE UP (ref 80–100)
MONOCYTES # BLD AUTO: 0.52 K/UL — SIGNIFICANT CHANGE UP (ref 0–0.9)
MONOCYTES NFR BLD AUTO: 4.2 % — SIGNIFICANT CHANGE UP (ref 2–14)
NEUTROPHILS # BLD AUTO: 9.68 K/UL — HIGH (ref 1.8–7.4)
NEUTROPHILS NFR BLD AUTO: 78.9 % — HIGH (ref 43–77)
NRBC # FLD: 0 K/UL — SIGNIFICANT CHANGE UP (ref 0–0)
PLATELET # BLD AUTO: 375 K/UL — SIGNIFICANT CHANGE UP (ref 150–400)
PMV BLD: 10.4 FL — SIGNIFICANT CHANGE UP (ref 7–13)
POTASSIUM SERPL-MCNC: 3.8 MMOL/L — SIGNIFICANT CHANGE UP (ref 3.5–5.3)
POTASSIUM SERPL-SCNC: 3.8 MMOL/L — SIGNIFICANT CHANGE UP (ref 3.5–5.3)
PROT SERPL-MCNC: 6.8 G/DL — SIGNIFICANT CHANGE UP (ref 6–8.3)
PROTHROM AB SERPL-ACNC: 11.5 SEC — SIGNIFICANT CHANGE UP (ref 10.6–13.6)
RBC # BLD: 3.94 M/UL — SIGNIFICANT CHANGE UP (ref 3.8–5.2)
RBC # FLD: 13.1 % — SIGNIFICANT CHANGE UP (ref 10.3–14.5)
SODIUM SERPL-SCNC: 141 MMOL/L — SIGNIFICANT CHANGE UP (ref 135–145)
SPECIMEN VOL 24H UR: 1500 ML — SIGNIFICANT CHANGE UP
URATE SERPL-MCNC: 2.8 MG/DL — SIGNIFICANT CHANGE UP (ref 2.5–7)
WBC # BLD: 12.28 K/UL — HIGH (ref 3.8–10.5)
WBC # FLD AUTO: 12.28 K/UL — HIGH (ref 3.8–10.5)

## 2020-09-11 PROCEDURE — 76811 OB US DETAILED SNGL FETUS: CPT | Mod: 26

## 2020-09-11 RX ORDER — BLOOD-GLUCOSE METER
KIT MISCELLANEOUS
Qty: 1 | Refills: 0 | Status: ACTIVE | COMMUNITY
Start: 2020-09-11 | End: 1900-01-01

## 2020-09-16 LAB
1ST TRIMESTER DATA: SIGNIFICANT CHANGE UP
2ND TRIMESTER DATA: SIGNIFICANT CHANGE UP
AFP SERPL-ACNC: SIGNIFICANT CHANGE UP
AFP SERPL-ACNC: SIGNIFICANT CHANGE UP
B-HCG FREE SERPL-MCNC: SIGNIFICANT CHANGE UP
B-HCG FREE SERPL-MCNC: SIGNIFICANT CHANGE UP
CLINICAL BIOCHEMIST REVIEW: SIGNIFICANT CHANGE UP
DEMOGRAPHIC DATA: SIGNIFICANT CHANGE UP
INHIBIN A SERPL-MCNC: SIGNIFICANT CHANGE UP
NT: SIGNIFICANT CHANGE UP
PAPP-A SERPL-ACNC: SIGNIFICANT CHANGE UP
SCREEN-FOOTER: SIGNIFICANT CHANGE UP
U ESTRIOL SERPL-SCNC: SIGNIFICANT CHANGE UP

## 2020-09-22 ENCOUNTER — RESULT REVIEW (OUTPATIENT)
Age: 30
End: 2020-09-22

## 2020-09-22 ENCOUNTER — ASOB RESULT (OUTPATIENT)
Age: 30
End: 2020-09-22

## 2020-09-22 ENCOUNTER — APPOINTMENT (OUTPATIENT)
Dept: PEDIATRIC CARDIOLOGY | Facility: CLINIC | Age: 30
End: 2020-09-22
Payer: MEDICAID

## 2020-09-22 ENCOUNTER — LABORATORY RESULT (OUTPATIENT)
Age: 30
End: 2020-09-22

## 2020-09-22 ENCOUNTER — OUTPATIENT (OUTPATIENT)
Dept: OUTPATIENT SERVICES | Facility: HOSPITAL | Age: 30
LOS: 1 days | End: 2020-09-22

## 2020-09-22 ENCOUNTER — APPOINTMENT (OUTPATIENT)
Dept: OBGYN | Facility: HOSPITAL | Age: 30
End: 2020-09-22

## 2020-09-22 ENCOUNTER — APPOINTMENT (OUTPATIENT)
Dept: ANTEPARTUM | Facility: CLINIC | Age: 30
End: 2020-09-22
Payer: MEDICAID

## 2020-09-22 ENCOUNTER — NON-APPOINTMENT (OUTPATIENT)
Age: 30
End: 2020-09-22

## 2020-09-22 VITALS
HEART RATE: 87 BPM | HEIGHT: 67 IN | SYSTOLIC BLOOD PRESSURE: 127 MMHG | DIASTOLIC BLOOD PRESSURE: 77 MMHG | BODY MASS INDEX: 33.27 KG/M2 | WEIGHT: 212 LBS

## 2020-09-22 DIAGNOSIS — O14.90 UNSPECIFIED PRE-ECLAMPSIA, UNSPECIFIED TRIMESTER: Chronic | ICD-10-CM

## 2020-09-22 DIAGNOSIS — K46.9 UNSPECIFIED ABDOMINAL HERNIA WITHOUT OBSTRUCTION OR GANGRENE: Chronic | ICD-10-CM

## 2020-09-22 DIAGNOSIS — Z98.89 OTHER SPECIFIED POSTPROCEDURAL STATES: Chronic | ICD-10-CM

## 2020-09-22 DIAGNOSIS — Q21.1 ATRIAL SEPTAL DEFECT: Chronic | ICD-10-CM

## 2020-09-22 LAB — GLUCOSE PRE/P GLC SERPL-SCNC: 126 — HIGH (ref 65–115)

## 2020-09-22 PROCEDURE — 99201 OFFICE OUTPATIENT NEW 10 MINUTES: CPT | Mod: 25

## 2020-09-22 PROCEDURE — 76820 UMBILICAL ARTERY ECHO: CPT

## 2020-09-22 PROCEDURE — 76825 ECHO EXAM OF FETAL HEART: CPT

## 2020-09-22 PROCEDURE — 93325 DOPPLER ECHO COLOR FLOW MAPG: CPT | Mod: 59

## 2020-09-22 PROCEDURE — 76827 ECHO EXAM OF FETAL HEART: CPT

## 2020-09-22 PROCEDURE — 76816 OB US FOLLOW-UP PER FETUS: CPT | Mod: 26

## 2020-09-23 LAB
CULTURE RESULTS: SIGNIFICANT CHANGE UP
SPECIMEN SOURCE: SIGNIFICANT CHANGE UP

## 2020-09-29 DIAGNOSIS — O09.299 SUPERVISION OF PREGNANCY WITH OTHER POOR REPRODUCTIVE OR OBSTETRIC HISTORY, UNSPECIFIED TRIMESTER: ICD-10-CM

## 2020-10-01 ENCOUNTER — APPOINTMENT (OUTPATIENT)
Dept: CARDIOLOGY | Facility: CLINIC | Age: 30
End: 2020-10-01
Payer: MEDICAID

## 2020-10-01 ENCOUNTER — NON-APPOINTMENT (OUTPATIENT)
Age: 30
End: 2020-10-01

## 2020-10-01 VITALS
HEIGHT: 67 IN | WEIGHT: 213 LBS | HEART RATE: 100 BPM | TEMPERATURE: 98 F | OXYGEN SATURATION: 97 % | BODY MASS INDEX: 33.43 KG/M2 | DIASTOLIC BLOOD PRESSURE: 79 MMHG | SYSTOLIC BLOOD PRESSURE: 117 MMHG

## 2020-10-01 PROCEDURE — 99213 OFFICE O/P EST LOW 20 MIN: CPT

## 2020-10-01 PROCEDURE — 93000 ELECTROCARDIOGRAM COMPLETE: CPT

## 2020-10-01 NOTE — PHYSICAL EXAM
[General Appearance - Well Developed] : well developed [Normal Appearance] : normal appearance [Well Groomed] : well groomed [General Appearance - Well Nourished] : well nourished [No Deformities] : no deformities [General Appearance - In No Acute Distress] : no acute distress [Normal Oral Mucosa] : normal oral mucosa [No Oral Pallor] : no oral pallor [No Oral Cyanosis] : no oral cyanosis [Normal Jugular Venous A Waves Present] : normal jugular venous A waves present [Normal Jugular Venous V Waves Present] : normal jugular venous V waves present [No Jugular Venous Kiser A Waves] : no jugular venous kiser A waves [Respiration, Rhythm And Depth] : normal respiratory rhythm and effort [Exaggerated Use Of Accessory Muscles For Inspiration] : no accessory muscle use [Auscultation Breath Sounds / Voice Sounds] : lungs were clear to auscultation bilaterally [Heart Rate And Rhythm] : heart rate and rhythm were normal [Heart Sounds] : normal S1 and S2 [Murmurs] : no murmurs present [Abdomen Soft] : soft [Abdomen Tenderness] : non-tender [] : no hepato-splenomegaly [Abdomen Mass (___ Cm)] : no abdominal mass palpated [Abnormal Walk] : normal gait [Gait - Sufficient For Exercise Testing] : the gait was sufficient for exercise testing

## 2020-10-01 NOTE — HISTORY OF PRESENT ILLNESS
[FreeTextEntry1] : Here for followup of BP\par No new complaints. BP doing well.\par Remains on ASA 81 mg daily\par No other complaints

## 2020-10-01 NOTE — DISCUSSION/SUMMARY
[FreeTextEntry1] : 29 year old woman 22 weeks pregnant with history of PEC and ASD closure\par Here for followup\par On ASA\par BP stable\par Continue to monitor\par

## 2020-10-06 ENCOUNTER — APPOINTMENT (OUTPATIENT)
Dept: OBGYN | Facility: HOSPITAL | Age: 30
End: 2020-10-06

## 2020-10-06 ENCOUNTER — OUTPATIENT (OUTPATIENT)
Dept: OUTPATIENT SERVICES | Facility: HOSPITAL | Age: 30
LOS: 1 days | End: 2020-10-06

## 2020-10-06 ENCOUNTER — NON-APPOINTMENT (OUTPATIENT)
Age: 30
End: 2020-10-06

## 2020-10-06 VITALS
WEIGHT: 210 LBS | SYSTOLIC BLOOD PRESSURE: 109 MMHG | BODY MASS INDEX: 32.96 KG/M2 | DIASTOLIC BLOOD PRESSURE: 71 MMHG | HEART RATE: 71 BPM | TEMPERATURE: 98.3 F | HEIGHT: 67 IN

## 2020-10-06 DIAGNOSIS — Q21.1 ATRIAL SEPTAL DEFECT: Chronic | ICD-10-CM

## 2020-10-06 DIAGNOSIS — Z98.89 OTHER SPECIFIED POSTPROCEDURAL STATES: Chronic | ICD-10-CM

## 2020-10-06 DIAGNOSIS — O14.90 UNSPECIFIED PRE-ECLAMPSIA, UNSPECIFIED TRIMESTER: Chronic | ICD-10-CM

## 2020-10-06 DIAGNOSIS — K46.9 UNSPECIFIED ABDOMINAL HERNIA WITHOUT OBSTRUCTION OR GANGRENE: Chronic | ICD-10-CM

## 2020-10-07 DIAGNOSIS — Z34.80 ENCOUNTER FOR SUPERVISION OF OTHER NORMAL PREGNANCY, UNSPECIFIED TRIMESTER: ICD-10-CM

## 2020-11-03 ENCOUNTER — NON-APPOINTMENT (OUTPATIENT)
Age: 30
End: 2020-11-03

## 2020-11-03 ENCOUNTER — RESULT REVIEW (OUTPATIENT)
Age: 30
End: 2020-11-03

## 2020-11-03 ENCOUNTER — LABORATORY RESULT (OUTPATIENT)
Age: 30
End: 2020-11-03

## 2020-11-03 ENCOUNTER — OUTPATIENT (OUTPATIENT)
Dept: OUTPATIENT SERVICES | Facility: HOSPITAL | Age: 30
LOS: 1 days | End: 2020-11-03

## 2020-11-03 ENCOUNTER — APPOINTMENT (OUTPATIENT)
Dept: OBGYN | Facility: HOSPITAL | Age: 30
End: 2020-11-03

## 2020-11-03 VITALS
HEART RATE: 84 BPM | TEMPERATURE: 98.2 F | WEIGHT: 219 LBS | BODY MASS INDEX: 34.3 KG/M2 | DIASTOLIC BLOOD PRESSURE: 52 MMHG | SYSTOLIC BLOOD PRESSURE: 114 MMHG

## 2020-11-03 DIAGNOSIS — Q21.1 ATRIAL SEPTAL DEFECT: Chronic | ICD-10-CM

## 2020-11-03 DIAGNOSIS — Z98.89 OTHER SPECIFIED POSTPROCEDURAL STATES: Chronic | ICD-10-CM

## 2020-11-03 DIAGNOSIS — O14.90 UNSPECIFIED PRE-ECLAMPSIA, UNSPECIFIED TRIMESTER: Chronic | ICD-10-CM

## 2020-11-03 DIAGNOSIS — B37.2 CANDIDIASIS OF SKIN AND NAIL: ICD-10-CM

## 2020-11-03 DIAGNOSIS — K46.9 UNSPECIFIED ABDOMINAL HERNIA WITHOUT OBSTRUCTION OR GANGRENE: Chronic | ICD-10-CM

## 2020-11-03 LAB
ALBUMIN SERPL ELPH-MCNC: 3.6 G/DL — SIGNIFICANT CHANGE UP (ref 3.3–5)
ALP SERPL-CCNC: 121 U/L — HIGH (ref 40–120)
ALT FLD-CCNC: 7 U/L — SIGNIFICANT CHANGE UP (ref 4–33)
ANION GAP SERPL CALC-SCNC: 13 MMO/L — SIGNIFICANT CHANGE UP (ref 7–14)
AST SERPL-CCNC: 11 U/L — SIGNIFICANT CHANGE UP (ref 4–32)
BASOPHILS # BLD AUTO: 0.04 K/UL — SIGNIFICANT CHANGE UP (ref 0–0.2)
BASOPHILS NFR BLD AUTO: 0.3 % — SIGNIFICANT CHANGE UP (ref 0–2)
BILIRUB SERPL-MCNC: 0.3 MG/DL — SIGNIFICANT CHANGE UP (ref 0.2–1.2)
BUN SERPL-MCNC: 5 MG/DL — LOW (ref 7–23)
CALCIUM SERPL-MCNC: 9.3 MG/DL — SIGNIFICANT CHANGE UP (ref 8.4–10.5)
CHLORIDE SERPL-SCNC: 101 MMOL/L — SIGNIFICANT CHANGE UP (ref 98–107)
CO2 SERPL-SCNC: 22 MMOL/L — SIGNIFICANT CHANGE UP (ref 22–31)
CREAT ?TM UR-MCNC: 269 MG/DL — SIGNIFICANT CHANGE UP
CREAT SERPL-MCNC: 0.54 MG/DL — SIGNIFICANT CHANGE UP (ref 0.5–1.3)
EOSINOPHIL # BLD AUTO: 0.24 K/UL — SIGNIFICANT CHANGE UP (ref 0–0.5)
EOSINOPHIL NFR BLD AUTO: 2 % — SIGNIFICANT CHANGE UP (ref 0–6)
GLUCOSE PRE/P GLC SERPL-SCNC: 161 — HIGH (ref 65–115)
GLUCOSE SERPL-MCNC: 149 MG/DL — HIGH (ref 70–99)
HCT VFR BLD CALC: 31.2 % — LOW (ref 34.5–45)
HGB BLD-MCNC: 10.1 G/DL — LOW (ref 11.5–15.5)
IMM GRANULOCYTES NFR BLD AUTO: 0.7 % — SIGNIFICANT CHANGE UP (ref 0–1.5)
LYMPHOCYTES # BLD AUTO: 1.61 K/UL — SIGNIFICANT CHANGE UP (ref 1–3.3)
LYMPHOCYTES # BLD AUTO: 13.3 % — SIGNIFICANT CHANGE UP (ref 13–44)
MCHC RBC-ENTMCNC: 28.1 PG — SIGNIFICANT CHANGE UP (ref 27–34)
MCHC RBC-ENTMCNC: 32.4 % — SIGNIFICANT CHANGE UP (ref 32–36)
MCV RBC AUTO: 86.9 FL — SIGNIFICANT CHANGE UP (ref 80–100)
MONOCYTES # BLD AUTO: 0.59 K/UL — SIGNIFICANT CHANGE UP (ref 0–0.9)
MONOCYTES NFR BLD AUTO: 4.9 % — SIGNIFICANT CHANGE UP (ref 2–14)
NEUTROPHILS # BLD AUTO: 9.58 K/UL — HIGH (ref 1.8–7.4)
NEUTROPHILS NFR BLD AUTO: 78.8 % — HIGH (ref 43–77)
NRBC # FLD: 0 K/UL — SIGNIFICANT CHANGE UP (ref 0–0)
PLATELET # BLD AUTO: 272 K/UL — SIGNIFICANT CHANGE UP (ref 150–400)
PMV BLD: 10.3 FL — SIGNIFICANT CHANGE UP (ref 7–13)
POTASSIUM SERPL-MCNC: 3.8 MMOL/L — SIGNIFICANT CHANGE UP (ref 3.5–5.3)
POTASSIUM SERPL-SCNC: 3.8 MMOL/L — SIGNIFICANT CHANGE UP (ref 3.5–5.3)
PROT SERPL-MCNC: 6.3 G/DL — SIGNIFICANT CHANGE UP (ref 6–8.3)
PROT UR-MCNC: 81.4 MG/DL — SIGNIFICANT CHANGE UP
RBC # BLD: 3.59 M/UL — LOW (ref 3.8–5.2)
RBC # FLD: 13.1 % — SIGNIFICANT CHANGE UP (ref 10.3–14.5)
SODIUM SERPL-SCNC: 136 MMOL/L — SIGNIFICANT CHANGE UP (ref 135–145)
WBC # BLD: 12.15 K/UL — HIGH (ref 3.8–10.5)
WBC # FLD AUTO: 12.15 K/UL — HIGH (ref 3.8–10.5)

## 2020-11-03 RX ORDER — NYSTATIN AND TRIAMCINOLONE ACETONIDE 100000; 1 MG/G; MG/G
100000-0.1 CREAM TOPICAL TWICE DAILY
Qty: 1 | Refills: 0 | Status: DISCONTINUED | COMMUNITY
Start: 2020-11-03 | End: 2020-11-03

## 2020-11-04 LAB — T PALLIDUM AB TITR SER: NEGATIVE — SIGNIFICANT CHANGE UP

## 2020-11-04 RX ORDER — CHLORHEXIDINE GLUCONATE 4 %
325 (65 FE) LIQUID (ML) TOPICAL DAILY
Qty: 40 | Refills: 3 | Status: ACTIVE | COMMUNITY
Start: 2020-11-04 | End: 1900-01-01

## 2020-11-05 DIAGNOSIS — Z34.80 ENCOUNTER FOR SUPERVISION OF OTHER NORMAL PREGNANCY, UNSPECIFIED TRIMESTER: ICD-10-CM

## 2020-11-05 DIAGNOSIS — Q21.1 ATRIAL SEPTAL DEFECT: ICD-10-CM

## 2020-11-05 DIAGNOSIS — O09.299 SUPERVISION OF PREGNANCY WITH OTHER POOR REPRODUCTIVE OR OBSTETRIC HISTORY, UNSPECIFIED TRIMESTER: ICD-10-CM

## 2020-11-05 LAB
CULTURE RESULTS: SIGNIFICANT CHANGE UP
SPECIMEN SOURCE: SIGNIFICANT CHANGE UP

## 2020-11-06 ENCOUNTER — LABORATORY RESULT (OUTPATIENT)
Age: 30
End: 2020-11-06

## 2020-11-06 ENCOUNTER — OUTPATIENT (OUTPATIENT)
Dept: OUTPATIENT SERVICES | Facility: HOSPITAL | Age: 30
LOS: 1 days | End: 2020-11-06

## 2020-11-06 ENCOUNTER — APPOINTMENT (OUTPATIENT)
Dept: OBGYN | Facility: HOSPITAL | Age: 30
End: 2020-11-06

## 2020-11-06 DIAGNOSIS — O14.90 UNSPECIFIED PRE-ECLAMPSIA, UNSPECIFIED TRIMESTER: Chronic | ICD-10-CM

## 2020-11-06 DIAGNOSIS — Q21.1 ATRIAL SEPTAL DEFECT: Chronic | ICD-10-CM

## 2020-11-06 DIAGNOSIS — Z98.89 OTHER SPECIFIED POSTPROCEDURAL STATES: Chronic | ICD-10-CM

## 2020-11-06 DIAGNOSIS — K46.9 UNSPECIFIED ABDOMINAL HERNIA WITHOUT OBSTRUCTION OR GANGRENE: Chronic | ICD-10-CM

## 2020-11-06 LAB
GLUCOSE 1H P CHAL SERPL-SCNC: 188 MG/DL — HIGH (ref 120–170)
GLUCOSE 2H P GLC SERPL-MCNC: 117 MG/DL — SIGNIFICANT CHANGE UP (ref 70–120)
GLUCOSE 3H P CHAL SERPL-SCNC: 125 MG/DL — HIGH (ref 70–115)
GLUCOSE P FAST SERPL-MCNC: 108 MG/DL — SIGNIFICANT CHANGE UP (ref 70–108)

## 2020-11-09 DIAGNOSIS — Z34.92 ENCOUNTER FOR SUPERVISION OF NORMAL PREGNANCY, UNSPECIFIED, SECOND TRIMESTER: ICD-10-CM

## 2020-11-12 ENCOUNTER — NON-APPOINTMENT (OUTPATIENT)
Age: 30
End: 2020-11-12

## 2020-11-16 ENCOUNTER — NON-APPOINTMENT (OUTPATIENT)
Age: 30
End: 2020-11-16

## 2020-11-20 ENCOUNTER — APPOINTMENT (OUTPATIENT)
Dept: OBGYN | Facility: HOSPITAL | Age: 30
End: 2020-11-20

## 2020-11-20 ENCOUNTER — OUTPATIENT (OUTPATIENT)
Dept: OUTPATIENT SERVICES | Facility: HOSPITAL | Age: 30
LOS: 1 days | End: 2020-11-20

## 2020-11-20 ENCOUNTER — NON-APPOINTMENT (OUTPATIENT)
Age: 30
End: 2020-11-20

## 2020-11-20 ENCOUNTER — ASOB RESULT (OUTPATIENT)
Age: 30
End: 2020-11-20

## 2020-11-20 ENCOUNTER — LABORATORY RESULT (OUTPATIENT)
Age: 30
End: 2020-11-20

## 2020-11-20 ENCOUNTER — APPOINTMENT (OUTPATIENT)
Dept: ANTEPARTUM | Facility: CLINIC | Age: 30
End: 2020-11-20
Payer: MEDICAID

## 2020-11-20 VITALS — DIASTOLIC BLOOD PRESSURE: 73 MMHG | SYSTOLIC BLOOD PRESSURE: 113 MMHG | BODY MASS INDEX: 33.83 KG/M2 | WEIGHT: 216 LBS

## 2020-11-20 VITALS — TEMPERATURE: 97.8 F

## 2020-11-20 DIAGNOSIS — K46.9 UNSPECIFIED ABDOMINAL HERNIA WITHOUT OBSTRUCTION OR GANGRENE: Chronic | ICD-10-CM

## 2020-11-20 DIAGNOSIS — Q21.1 ATRIAL SEPTAL DEFECT: Chronic | ICD-10-CM

## 2020-11-20 DIAGNOSIS — Z98.89 OTHER SPECIFIED POSTPROCEDURAL STATES: Chronic | ICD-10-CM

## 2020-11-20 DIAGNOSIS — O14.90 UNSPECIFIED PRE-ECLAMPSIA, UNSPECIFIED TRIMESTER: Chronic | ICD-10-CM

## 2020-11-20 LAB — GLUCOSE BLDC GLUCOMTR-MCNC: 100

## 2020-11-20 PROCEDURE — 76816 OB US FOLLOW-UP PER FETUS: CPT | Mod: 26

## 2020-11-20 PROCEDURE — 76819 FETAL BIOPHYS PROFIL W/O NST: CPT | Mod: 26

## 2020-11-23 ENCOUNTER — NON-APPOINTMENT (OUTPATIENT)
Age: 30
End: 2020-11-23

## 2020-11-23 ENCOUNTER — APPOINTMENT (OUTPATIENT)
Dept: OBGYN | Facility: HOSPITAL | Age: 30
End: 2020-11-23
Payer: MEDICAID

## 2020-11-23 ENCOUNTER — OUTPATIENT (OUTPATIENT)
Dept: OUTPATIENT SERVICES | Facility: HOSPITAL | Age: 30
LOS: 1 days | End: 2020-11-23

## 2020-11-23 DIAGNOSIS — K46.9 UNSPECIFIED ABDOMINAL HERNIA WITHOUT OBSTRUCTION OR GANGRENE: Chronic | ICD-10-CM

## 2020-11-23 DIAGNOSIS — Q21.1 ATRIAL SEPTAL DEFECT: Chronic | ICD-10-CM

## 2020-11-23 DIAGNOSIS — Z98.89 OTHER SPECIFIED POSTPROCEDURAL STATES: Chronic | ICD-10-CM

## 2020-11-23 DIAGNOSIS — O14.90 UNSPECIFIED PRE-ECLAMPSIA, UNSPECIFIED TRIMESTER: Chronic | ICD-10-CM

## 2020-11-23 PROCEDURE — 99203 OFFICE O/P NEW LOW 30 MIN: CPT | Mod: TH,GC,25

## 2020-11-30 ENCOUNTER — RX RENEWAL (OUTPATIENT)
Age: 30
End: 2020-11-30

## 2020-11-30 DIAGNOSIS — O24.419 GESTATIONAL DIABETES MELLITUS IN PREGNANCY, UNSPECIFIED CONTROL: ICD-10-CM

## 2020-12-03 ENCOUNTER — NON-APPOINTMENT (OUTPATIENT)
Age: 30
End: 2020-12-03

## 2020-12-03 ENCOUNTER — APPOINTMENT (OUTPATIENT)
Dept: CARDIOLOGY | Facility: CLINIC | Age: 30
End: 2020-12-03
Payer: MEDICAID

## 2020-12-03 VITALS
OXYGEN SATURATION: 98 % | DIASTOLIC BLOOD PRESSURE: 79 MMHG | HEIGHT: 67 IN | HEART RATE: 85 BPM | BODY MASS INDEX: 33.74 KG/M2 | SYSTOLIC BLOOD PRESSURE: 117 MMHG | RESPIRATION RATE: 16 BRPM | WEIGHT: 215 LBS

## 2020-12-03 PROCEDURE — 93000 ELECTROCARDIOGRAM COMPLETE: CPT

## 2020-12-03 PROCEDURE — 99213 OFFICE O/P EST LOW 20 MIN: CPT

## 2020-12-03 PROCEDURE — 99072 ADDL SUPL MATRL&STAF TM PHE: CPT

## 2020-12-03 NOTE — DISCUSSION/SUMMARY
[FreeTextEntry1] : 29 year old woman 32 weeks pregnant with history of PEC and ASD closure\par Here for followup\par On ASA\par BP stable\par Continue to monitor\par

## 2020-12-03 NOTE — HISTORY OF PRESENT ILLNESS
[FreeTextEntry1] : Here for followup of BP\par No new complaints. BP doing well.\par Remains on ASA 81 mg daily\par

## 2020-12-07 ENCOUNTER — RESULT REVIEW (OUTPATIENT)
Age: 30
End: 2020-12-07

## 2020-12-07 ENCOUNTER — NON-APPOINTMENT (OUTPATIENT)
Age: 30
End: 2020-12-07

## 2020-12-07 ENCOUNTER — OUTPATIENT (OUTPATIENT)
Dept: OUTPATIENT SERVICES | Facility: HOSPITAL | Age: 30
LOS: 1 days | End: 2020-12-07

## 2020-12-07 ENCOUNTER — APPOINTMENT (OUTPATIENT)
Dept: OBGYN | Facility: HOSPITAL | Age: 30
End: 2020-12-07

## 2020-12-07 VITALS
TEMPERATURE: 98.1 F | DIASTOLIC BLOOD PRESSURE: 76 MMHG | SYSTOLIC BLOOD PRESSURE: 114 MMHG | WEIGHT: 215 LBS | BODY MASS INDEX: 33.67 KG/M2 | HEART RATE: 92 BPM

## 2020-12-07 DIAGNOSIS — O99.419 DISEASES OF THE CIRCULATORY SYSTEM COMPLICATING PREGNANCY, UNSPECIFIED TRIMESTER: ICD-10-CM

## 2020-12-07 DIAGNOSIS — O14.90 UNSPECIFIED PRE-ECLAMPSIA, UNSPECIFIED TRIMESTER: Chronic | ICD-10-CM

## 2020-12-07 DIAGNOSIS — Z98.89 OTHER SPECIFIED POSTPROCEDURAL STATES: Chronic | ICD-10-CM

## 2020-12-07 DIAGNOSIS — K46.9 UNSPECIFIED ABDOMINAL HERNIA WITHOUT OBSTRUCTION OR GANGRENE: Chronic | ICD-10-CM

## 2020-12-07 DIAGNOSIS — Q24.9 DISEASES OF THE CIRCULATORY SYSTEM COMPLICATING PREGNANCY, UNSPECIFIED TRIMESTER: ICD-10-CM

## 2020-12-07 DIAGNOSIS — Q21.1 ATRIAL SEPTAL DEFECT: Chronic | ICD-10-CM

## 2020-12-07 LAB
COLLECT DURATION TIME UR: 24 HR — SIGNIFICANT CHANGE UP
GLUCOSE BLDC GLUCOMTR-MCNC: 92
PROT 24H UR-MRATE: 299 MG/24 H — HIGH
TOTAL VOLUME - 24 HOUR: 850 ML — SIGNIFICANT CHANGE UP
URINE CREATININE CALCULATION: 1.6 G/24 H — SIGNIFICANT CHANGE UP (ref 0.6–1.6)

## 2020-12-09 DIAGNOSIS — D64.9 ANEMIA, UNSPECIFIED: ICD-10-CM

## 2020-12-09 DIAGNOSIS — O24.419 GESTATIONAL DIABETES MELLITUS IN PREGNANCY, UNSPECIFIED CONTROL: ICD-10-CM

## 2020-12-09 DIAGNOSIS — O99.419 DISEASES OF THE CIRCULATORY SYSTEM COMPLICATING PREGNANCY, UNSPECIFIED TRIMESTER: ICD-10-CM

## 2020-12-09 DIAGNOSIS — O09.299 SUPERVISION OF PREGNANCY WITH OTHER POOR REPRODUCTIVE OR OBSTETRIC HISTORY, UNSPECIFIED TRIMESTER: ICD-10-CM

## 2020-12-09 DIAGNOSIS — O12.10 GESTATIONAL PROTEINURIA, UNSPECIFIED TRIMESTER: ICD-10-CM

## 2020-12-21 ENCOUNTER — APPOINTMENT (OUTPATIENT)
Dept: OBGYN | Facility: HOSPITAL | Age: 30
End: 2020-12-21
Payer: MEDICAID

## 2020-12-21 ENCOUNTER — RESULT REVIEW (OUTPATIENT)
Age: 30
End: 2020-12-21

## 2020-12-21 ENCOUNTER — NON-APPOINTMENT (OUTPATIENT)
Age: 30
End: 2020-12-21

## 2020-12-21 ENCOUNTER — OUTPATIENT (OUTPATIENT)
Dept: OUTPATIENT SERVICES | Facility: HOSPITAL | Age: 30
LOS: 1 days | End: 2020-12-21

## 2020-12-21 VITALS
SYSTOLIC BLOOD PRESSURE: 109 MMHG | WEIGHT: 214 LBS | TEMPERATURE: 97.9 F | HEART RATE: 77 BPM | DIASTOLIC BLOOD PRESSURE: 78 MMHG | BODY MASS INDEX: 33.52 KG/M2

## 2020-12-21 DIAGNOSIS — Z30.9 ENCOUNTER FOR CONTRACEPTIVE MANAGEMENT, UNSPECIFIED: ICD-10-CM

## 2020-12-21 DIAGNOSIS — K46.9 UNSPECIFIED ABDOMINAL HERNIA WITHOUT OBSTRUCTION OR GANGRENE: Chronic | ICD-10-CM

## 2020-12-21 DIAGNOSIS — Z98.89 OTHER SPECIFIED POSTPROCEDURAL STATES: Chronic | ICD-10-CM

## 2020-12-21 DIAGNOSIS — Q21.1 ATRIAL SEPTAL DEFECT: Chronic | ICD-10-CM

## 2020-12-21 DIAGNOSIS — O14.90 UNSPECIFIED PRE-ECLAMPSIA, UNSPECIFIED TRIMESTER: Chronic | ICD-10-CM

## 2020-12-21 DIAGNOSIS — O12.10 GESTATIONAL PROTEINURIA, UNSPECIFIED TRIMESTER: ICD-10-CM

## 2020-12-21 LAB
BASOPHILS # BLD AUTO: 0.04 K/UL — SIGNIFICANT CHANGE UP (ref 0–0.2)
BASOPHILS NFR BLD AUTO: 0.5 % — SIGNIFICANT CHANGE UP (ref 0–2)
EOSINOPHIL # BLD AUTO: 0.19 K/UL — SIGNIFICANT CHANGE UP (ref 0–0.5)
EOSINOPHIL NFR BLD AUTO: 2.3 % — SIGNIFICANT CHANGE UP (ref 0–6)
HCT VFR BLD CALC: 29.9 % — LOW (ref 34.5–45)
HGB BLD-MCNC: 9.2 G/DL — LOW (ref 11.5–15.5)
IANC: 6.35 K/UL — SIGNIFICANT CHANGE UP (ref 1.5–8.5)
IMM GRANULOCYTES NFR BLD AUTO: 0.6 % — SIGNIFICANT CHANGE UP (ref 0–1.5)
LYMPHOCYTES # BLD AUTO: 1.4 K/UL — SIGNIFICANT CHANGE UP (ref 1–3.3)
LYMPHOCYTES # BLD AUTO: 16.6 % — SIGNIFICANT CHANGE UP (ref 13–44)
MCHC RBC-ENTMCNC: 24.9 PG — LOW (ref 27–34)
MCHC RBC-ENTMCNC: 30.8 GM/DL — LOW (ref 32–36)
MCV RBC AUTO: 81 FL — SIGNIFICANT CHANGE UP (ref 80–100)
MONOCYTES # BLD AUTO: 0.41 K/UL — SIGNIFICANT CHANGE UP (ref 0–0.9)
MONOCYTES NFR BLD AUTO: 4.9 % — SIGNIFICANT CHANGE UP (ref 2–14)
NEUTROPHILS # BLD AUTO: 6.35 K/UL — SIGNIFICANT CHANGE UP (ref 1.8–7.4)
NEUTROPHILS NFR BLD AUTO: 75.1 % — SIGNIFICANT CHANGE UP (ref 43–77)
NRBC # BLD: 0 /100 WBCS — SIGNIFICANT CHANGE UP
NRBC # FLD: 0 K/UL — SIGNIFICANT CHANGE UP
PLATELET # BLD AUTO: 310 K/UL — SIGNIFICANT CHANGE UP (ref 150–400)
RBC # BLD: 3.69 M/UL — LOW (ref 3.8–5.2)
RBC # FLD: 14.6 % — HIGH (ref 10.3–14.5)
WBC # BLD: 8.44 K/UL — SIGNIFICANT CHANGE UP (ref 3.8–10.5)
WBC # FLD AUTO: 8.44 K/UL — SIGNIFICANT CHANGE UP (ref 3.8–10.5)

## 2020-12-21 PROCEDURE — 99213 OFFICE O/P EST LOW 20 MIN: CPT | Mod: TH,25

## 2020-12-22 DIAGNOSIS — O24.419 GESTATIONAL DIABETES MELLITUS IN PREGNANCY, UNSPECIFIED CONTROL: ICD-10-CM

## 2020-12-22 DIAGNOSIS — D64.9 ANEMIA, UNSPECIFIED: ICD-10-CM

## 2020-12-22 DIAGNOSIS — Z30.9 ENCOUNTER FOR CONTRACEPTIVE MANAGEMENT, UNSPECIFIED: ICD-10-CM

## 2020-12-22 DIAGNOSIS — O09.299 SUPERVISION OF PREGNANCY WITH OTHER POOR REPRODUCTIVE OR OBSTETRIC HISTORY, UNSPECIFIED TRIMESTER: ICD-10-CM

## 2020-12-22 DIAGNOSIS — Q21.1 ATRIAL SEPTAL DEFECT: ICD-10-CM

## 2020-12-22 DIAGNOSIS — O12.10 GESTATIONAL PROTEINURIA, UNSPECIFIED TRIMESTER: ICD-10-CM

## 2020-12-22 LAB
CULTURE RESULTS: SIGNIFICANT CHANGE UP
SPECIMEN SOURCE: SIGNIFICANT CHANGE UP

## 2020-12-24 ENCOUNTER — APPOINTMENT (OUTPATIENT)
Dept: ANTEPARTUM | Facility: CLINIC | Age: 30
End: 2020-12-24

## 2020-12-29 ENCOUNTER — NON-APPOINTMENT (OUTPATIENT)
Age: 30
End: 2020-12-29

## 2020-12-31 ENCOUNTER — NON-APPOINTMENT (OUTPATIENT)
Age: 30
End: 2020-12-31

## 2021-01-01 ENCOUNTER — OUTPATIENT (OUTPATIENT)
Dept: OUTPATIENT SERVICES | Facility: HOSPITAL | Age: 31
LOS: 1 days | End: 2021-01-01

## 2021-01-01 ENCOUNTER — OUTPATIENT (OUTPATIENT)
Dept: OUTPATIENT SERVICES | Facility: HOSPITAL | Age: 31
LOS: 1 days | End: 2021-01-01
Payer: MEDICAID

## 2021-01-01 DIAGNOSIS — Z98.89 OTHER SPECIFIED POSTPROCEDURAL STATES: Chronic | ICD-10-CM

## 2021-01-01 DIAGNOSIS — K46.9 UNSPECIFIED ABDOMINAL HERNIA WITHOUT OBSTRUCTION OR GANGRENE: Chronic | ICD-10-CM

## 2021-01-01 DIAGNOSIS — O14.90 UNSPECIFIED PRE-ECLAMPSIA, UNSPECIFIED TRIMESTER: Chronic | ICD-10-CM

## 2021-01-01 DIAGNOSIS — Q21.1 ATRIAL SEPTAL DEFECT: Chronic | ICD-10-CM

## 2021-01-01 PROCEDURE — G9005: CPT

## 2021-01-04 ENCOUNTER — NON-APPOINTMENT (OUTPATIENT)
Age: 31
End: 2021-01-04

## 2021-01-04 ENCOUNTER — APPOINTMENT (OUTPATIENT)
Dept: ANTEPARTUM | Facility: CLINIC | Age: 31
End: 2021-01-04
Payer: MEDICAID

## 2021-01-04 ENCOUNTER — TRANSCRIPTION ENCOUNTER (OUTPATIENT)
Age: 31
End: 2021-01-04

## 2021-01-04 ENCOUNTER — APPOINTMENT (OUTPATIENT)
Dept: CARDIOLOGY | Facility: CLINIC | Age: 31
End: 2021-01-04

## 2021-01-04 ENCOUNTER — INPATIENT (INPATIENT)
Facility: HOSPITAL | Age: 31
LOS: 0 days | Discharge: HOME | End: 2021-01-04
Attending: SPECIALIST | Admitting: SPECIALIST

## 2021-01-04 ENCOUNTER — APPOINTMENT (OUTPATIENT)
Dept: OBGYN | Facility: HOSPITAL | Age: 31
End: 2021-01-04

## 2021-01-04 ENCOUNTER — ASOB RESULT (OUTPATIENT)
Age: 31
End: 2021-01-04

## 2021-01-04 ENCOUNTER — OUTPATIENT (OUTPATIENT)
Dept: OUTPATIENT SERVICES | Facility: HOSPITAL | Age: 31
LOS: 1 days | End: 2021-01-04

## 2021-01-04 VITALS
SYSTOLIC BLOOD PRESSURE: 99 MMHG | HEART RATE: 98 BPM | BODY MASS INDEX: 34.37 KG/M2 | WEIGHT: 219 LBS | TEMPERATURE: 99 F | HEIGHT: 67 IN | DIASTOLIC BLOOD PRESSURE: 78 MMHG

## 2021-01-04 VITALS
TEMPERATURE: 98 F | RESPIRATION RATE: 17 BRPM | HEART RATE: 100 BPM | SYSTOLIC BLOOD PRESSURE: 124 MMHG | DIASTOLIC BLOOD PRESSURE: 78 MMHG

## 2021-01-04 VITALS — SYSTOLIC BLOOD PRESSURE: 127 MMHG | DIASTOLIC BLOOD PRESSURE: 85 MMHG | HEART RATE: 91 BPM

## 2021-01-04 DIAGNOSIS — O14.90 UNSPECIFIED PRE-ECLAMPSIA, UNSPECIFIED TRIMESTER: Chronic | ICD-10-CM

## 2021-01-04 DIAGNOSIS — Z98.890 OTHER SPECIFIED POSTPROCEDURAL STATES: Chronic | ICD-10-CM

## 2021-01-04 DIAGNOSIS — K46.9 UNSPECIFIED ABDOMINAL HERNIA WITHOUT OBSTRUCTION OR GANGRENE: Chronic | ICD-10-CM

## 2021-01-04 DIAGNOSIS — O26.899 OTHER SPECIFIED PREGNANCY RELATED CONDITIONS, UNSPECIFIED TRIMESTER: ICD-10-CM

## 2021-01-04 DIAGNOSIS — O34.219 MATERNAL CARE FOR UNSPECIFIED TYPE SCAR FROM PREVIOUS CESAREAN DELIVERY: ICD-10-CM

## 2021-01-04 DIAGNOSIS — Q21.1 ATRIAL SEPTAL DEFECT: Chronic | ICD-10-CM

## 2021-01-04 DIAGNOSIS — Z3A.00 WEEKS OF GESTATION OF PREGNANCY NOT SPECIFIED: ICD-10-CM

## 2021-01-04 DIAGNOSIS — O41.03X0 OLIGOHYDRAMNIOS, THIRD TRIMESTER, NOT APPLICABLE OR UNSPECIFIED: ICD-10-CM

## 2021-01-04 DIAGNOSIS — O09.299 SUPERVISION OF PREGNANCY WITH OTHER POOR REPRODUCTIVE OR OBSTETRIC HISTORY, UNSPECIFIED TRIMESTER: ICD-10-CM

## 2021-01-04 DIAGNOSIS — Z98.89 OTHER SPECIFIED POSTPROCEDURAL STATES: Chronic | ICD-10-CM

## 2021-01-04 LAB
AMNISURE ROM (RUPTURE OF MEMBRANES): NEGATIVE — SIGNIFICANT CHANGE UP
BASOPHILS # BLD AUTO: 0.03 K/UL — SIGNIFICANT CHANGE UP (ref 0–0.2)
BASOPHILS NFR BLD AUTO: 0.3 % — SIGNIFICANT CHANGE UP (ref 0–2)
EOSINOPHIL # BLD AUTO: 0.24 K/UL — SIGNIFICANT CHANGE UP (ref 0–0.5)
EOSINOPHIL NFR BLD AUTO: 2.7 % — SIGNIFICANT CHANGE UP (ref 0–6)
HCT VFR BLD CALC: 30 % — LOW (ref 34.5–45)
HGB BLD-MCNC: 9.2 G/DL — LOW (ref 11.5–15.5)
IANC: 6.77 K/UL — SIGNIFICANT CHANGE UP (ref 1.5–8.5)
IMM GRANULOCYTES NFR BLD AUTO: 0.7 % — SIGNIFICANT CHANGE UP (ref 0–1.5)
LYMPHOCYTES # BLD AUTO: 1.44 K/UL — SIGNIFICANT CHANGE UP (ref 1–3.3)
LYMPHOCYTES # BLD AUTO: 15.9 % — SIGNIFICANT CHANGE UP (ref 13–44)
MCHC RBC-ENTMCNC: 24.1 PG — LOW (ref 27–34)
MCHC RBC-ENTMCNC: 30.7 GM/DL — LOW (ref 32–36)
MCV RBC AUTO: 78.5 FL — LOW (ref 80–100)
MONOCYTES # BLD AUTO: 0.51 K/UL — SIGNIFICANT CHANGE UP (ref 0–0.9)
MONOCYTES NFR BLD AUTO: 5.6 % — SIGNIFICANT CHANGE UP (ref 2–14)
NEUTROPHILS # BLD AUTO: 6.77 K/UL — SIGNIFICANT CHANGE UP (ref 1.8–7.4)
NEUTROPHILS NFR BLD AUTO: 74.8 % — SIGNIFICANT CHANGE UP (ref 43–77)
NRBC # BLD: 0 /100 WBCS — SIGNIFICANT CHANGE UP
NRBC # FLD: 0 K/UL — SIGNIFICANT CHANGE UP
PLATELET # BLD AUTO: 279 K/UL — SIGNIFICANT CHANGE UP (ref 150–400)
RBC # BLD: 3.82 M/UL — SIGNIFICANT CHANGE UP (ref 3.8–5.2)
RBC # FLD: 15.4 % — HIGH (ref 10.3–14.5)
RH IG SCN BLD-IMP: POSITIVE — SIGNIFICANT CHANGE UP
SARS-COV-2 RNA SPEC QL NAA+PROBE: SIGNIFICANT CHANGE UP
WBC # BLD: 9.05 K/UL — SIGNIFICANT CHANGE UP (ref 3.8–10.5)
WBC # FLD AUTO: 9.05 K/UL — SIGNIFICANT CHANGE UP (ref 3.8–10.5)

## 2021-01-04 PROCEDURE — 76819 FETAL BIOPHYS PROFIL W/O NST: CPT | Mod: 26

## 2021-01-04 PROCEDURE — 76816 OB US FOLLOW-UP PER FETUS: CPT | Mod: 26

## 2021-01-04 RX ORDER — ASPIRIN/CALCIUM CARB/MAGNESIUM 324 MG
0 TABLET ORAL
Qty: 0 | Refills: 0 | DISCHARGE

## 2021-01-04 RX ORDER — GLUCAGON INJECTION, SOLUTION 0.5 MG/.1ML
1 INJECTION, SOLUTION SUBCUTANEOUS ONCE
Refills: 0 | Status: DISCONTINUED | OUTPATIENT
Start: 2021-01-04 | End: 2021-01-04

## 2021-01-04 RX ORDER — SODIUM CHLORIDE 9 MG/ML
1000 INJECTION, SOLUTION INTRAVENOUS
Refills: 0 | Status: DISCONTINUED | OUTPATIENT
Start: 2021-01-04 | End: 2021-01-04

## 2021-01-04 RX ORDER — INFLUENZA VIRUS VACCINE 15; 15; 15; 15 UG/.5ML; UG/.5ML; UG/.5ML; UG/.5ML
0.5 SUSPENSION INTRAMUSCULAR ONCE
Refills: 0 | Status: DISCONTINUED | OUTPATIENT
Start: 2021-01-04 | End: 2021-01-04

## 2021-01-04 RX ORDER — DEXTROSE 50 % IN WATER 50 %
15 SYRINGE (ML) INTRAVENOUS ONCE
Refills: 0 | Status: DISCONTINUED | OUTPATIENT
Start: 2021-01-04 | End: 2021-01-04

## 2021-01-04 RX ORDER — FAMOTIDINE 10 MG/ML
20 INJECTION INTRAVENOUS ONCE
Refills: 0 | Status: COMPLETED | OUTPATIENT
Start: 2021-01-04 | End: 2021-01-04

## 2021-01-04 RX ORDER — SODIUM CHLORIDE 9 MG/ML
1000 INJECTION, SOLUTION INTRAVENOUS ONCE
Refills: 0 | Status: DISCONTINUED | OUTPATIENT
Start: 2021-01-04 | End: 2021-01-04

## 2021-01-04 RX ORDER — OXYTOCIN 10 UNIT/ML
333.33 VIAL (ML) INJECTION
Qty: 20 | Refills: 0 | Status: DISCONTINUED | OUTPATIENT
Start: 2021-01-04 | End: 2021-01-04

## 2021-01-04 RX ORDER — CITRIC ACID/SODIUM CITRATE 300-500 MG
30 SOLUTION, ORAL ORAL ONCE
Refills: 0 | Status: COMPLETED | OUTPATIENT
Start: 2021-01-04 | End: 2021-01-04

## 2021-01-04 RX ORDER — IBUPROFEN 200 MG
0 TABLET ORAL
Qty: 0 | Refills: 0 | DISCHARGE

## 2021-01-04 RX ORDER — DEXTROSE 50 % IN WATER 50 %
12.5 SYRINGE (ML) INTRAVENOUS ONCE
Refills: 0 | Status: DISCONTINUED | OUTPATIENT
Start: 2021-01-04 | End: 2021-01-04

## 2021-01-04 RX ORDER — HUMAN INSULIN 100 [IU]/ML
20 INJECTION, SUSPENSION SUBCUTANEOUS AT BEDTIME
Refills: 0 | Status: DISCONTINUED | OUTPATIENT
Start: 2021-01-04 | End: 2021-01-04

## 2021-01-04 RX ORDER — DEXTROSE 50 % IN WATER 50 %
25 SYRINGE (ML) INTRAVENOUS ONCE
Refills: 0 | Status: DISCONTINUED | OUTPATIENT
Start: 2021-01-04 | End: 2021-01-04

## 2021-01-04 RX ADMIN — SODIUM CHLORIDE 125 MILLILITER(S): 9 INJECTION, SOLUTION INTRAVENOUS at 17:16

## 2021-01-04 NOTE — DISCHARGE NOTE ANTEPARTUM - HOSPITAL COURSE
31yo  @ 36.3 wks GDMA2 presented to triage from clinic for r/o ROM. Pt reports she was examined and ruled out for ROM but today she had a sonogram that revealed an DANN of 4.2. Upon review of the ATU ultrasound from [facility], overall fluid is decreased but a 2x2cm pocket is noted. BPP is 8/8 and umbilical artery dopplers are within normal limits. Due to finding of 2x2 pocket of fluid, patient does not meet criteria for oligohydramnios and does not require delivery at this time. Other sources of oligohydramnios such as ROM were ruled out with the triage evaluation. Repeat scan was notable for pocket of 3x4 cm seen and again a pocket of 2x3 cm; BPP 8/8. No acute OB intervention was required and patient was discharged with twice weekly antepartum testing.    31yo  @ 36.3 wks GDMA2 presented to triage from clinic for r/o ROM. Pt reports she was examined and ruled out for ROM but today she had a sonogram that revealed an DANN of 4.2. Upon review of the ATU ultrasound from [facility], overall fluid is decreased but a 2x2cm pocket is noted. BPP is 8/8 and umbilical artery dopplers are within normal limits. Due to finding of 2x2 pocket of fluid, patient does not require delivery at this time. Other sources of oligohydramnios such as ROM were ruled out with the triage evaluation. Repeat scan was notable for pocket of 3x4 cm seen and again a pocket of 2x3 cm; BPP 8/8. No acute OB intervention was required and patient was discharged with twice weekly antepartum testing. ATU email sent to facilitate scheduling.

## 2021-01-04 NOTE — OB RN TRIAGE NOTE - PSH
ASD (atrial septal defect)  repair 2002  H/O  section  2007 PEC  2014 scheduled  Hernia  b/l 1998  Preeclampsia  2007   ASD (atrial septal defect)  repair 2002; followed by Dr Cortez  H/O abdominoplasty    H/O bilateral breast reduction surgery  2018  H/O  section  2007 35wk PEC 4#8 male  3/21/2014 scheduled c/s GDMA1 7#5 male  Hernia  b/l 1998  Preeclampsia  2007

## 2021-01-04 NOTE — OB PROVIDER TRIAGE NOTE - PSH
ASD (atrial septal defect)  repair 2002; followed by Dr Cortez  H/O abdominoplasty    H/O bilateral breast reduction surgery  2018  H/O  section  2007 35wk PEC 4#8 male  3/21/2014 scheduled c/s GDMA1 7#5 male  Hernia  b/l 1998  Preeclampsia  2007

## 2021-01-04 NOTE — DISCHARGE NOTE ANTEPARTUM - CARE PLAN
Principal Discharge DX:	Pregnancy  Goal:	outpatient follow-up  Assessment and plan of treatment:	- twice weekly antepartum testing  - returned to triage if concerned for loss of fluid, vaginal bleeding, contractions, or decreased fetal movement   Principal Discharge DX:	Oligohydramnios  Goal:	outpatient follow-up  Assessment and plan of treatment:	- twice weekly antepartum testing with NST and BPP  - ATU has been emailed, they will call you to facilitate scheduling. If you do not hear from the ATU by Tuesday at the end of the day, please call to schedule for Thursday.  - returned to triage if concerned for loss of fluid, vaginal bleeding, contractions, or decreased fetal movement

## 2021-01-04 NOTE — OB RN TRIAGE NOTE - PMH
ASD (atrial septal defect)  H/o  Diabetes mellitus  gestational  Hernia  h/o  HTN (hypertension)  h/o  1st pregnancy  Shingles  h/o 2007   ASD (atrial septal defect)  H/o  Diabetes mellitus  gestational  Hernia  h/o  History of termination of pregnancy  x1  HTN (hypertension)  h/o  1st pregnancy  Shingles  h/o 2007

## 2021-01-04 NOTE — OB PROVIDER TRIAGE NOTE - NSHPPHYSICALEXAM_GEN_ALL_CORE
Gen:A &O x 3; NAD  Vitals: BP-124/78; P-100; T-36.8    Pulm-CTA B/L; no wheezes  Cor-clear S1S2; no murmurs  Abd exam-soft and nontender    SSE-os appears closed. Negative pooling/ neg nitrazine; negative ferning  Amnisure performed and is negative    TAS-see ATU report Gen:A &O x 3; NAD  Vitals: BP-124/78; P-100; T-36.8    Pulm-CTA B/L; no wheezes  Cor-clear S1S2; no murmurs  Abd exam-soft and nontender    SSE-os appears closed. Negative pooling/ neg nitrazine; negative ferning  Amnisure performed and is negative    TAS-see ATU report. DANN 4.2 cm with no 2 x 2 pocket of fluid    NST cat I with accels and mod variability; no ctx's

## 2021-01-04 NOTE — OB RN TRIAGE NOTE - CHIEF COMPLAINT QUOTE
sent from the clinic to r/o rupt. of memebranes for 2 1/2 weeks my panties are always wet  scheduled c/s 1/22 sent from the clinic to r/o rupt. of memebranes for 2 1/2 weeks my panties are always wet and abd. cramping  scheduled c/s 1/22

## 2021-01-04 NOTE — OB PROVIDER TRIAGE NOTE - NSOBPROVIDERNOTE_OBGYN_ALL_OB_FT
29yo  @ 36.3 wks SLIUP GDMA2 here from the clinic for r/o ROM ryan to DANN of 4.2 cm with no 2 x 2 pocket  -SSE is negative for ROM and amnisure is neg  -pt is a previous  x 2 29yo  @ 36.3 wks SLIUP GDMA2 here from the clinic for r/o ROM ryan to DANN of 4.2 cm with no 2 x 2 pocket  -SSE is negative for ROM and amnisure is neg  -pt is a previous  x 2  -pt was dw Dr Hopkins and Dr Park. Pt was admitted for rpt   -fets with normal fetal echo but recommendation for pediatric caria follow up   ------------------------  -Normal fetal echocardiogram.  Ms. Mulligan had device closure of an atrial septal defect by Dr. Simon Walton. Ms. Mulligan has a child with a VSD, which has closed spontaneously.  In light of the above family history I would recommend a non-urgent outpatient  cardiology follow-up within 4 -6 weeks after delivery, or sooner, if there are any clinical cardiac concerns  Janice Lewis RN,MSN 10.6.2020

## 2021-01-04 NOTE — OB PROVIDER H&P - HISTORY OF PRESENT ILLNESS
31yo female  @ 36.3 wks SLIUP GDMA2 here from clinic for rule out ROM. Pt reports she presented to the clinic 2 weeks ago for a one time episode of feeling wet. Pt reports she was examined and ruled out for ROM but today she had a sonogram that revealed an DANN of 4.2. Pt denies any sensation of feeling wet since then. Pt reports GFM and denies any VB/ctx's.    Pmhx-hx ASD followed by Dr Pride  [id number] repair; ASD repair-; -abdominoplasty; 2018-breast reduction  Meds-insulin 20u QHS; ASA   Past ob-TOP x 1               2007-35 wks  for pre-eclampsia-4#8               3/21/2014-7#5 Rpt  7#5; GDMA1  Gyn-denies

## 2021-01-04 NOTE — OB RN TRIAGE NOTE - NSMATERNALFETALCONCERNS_OBGYN_ALL_OB_FT
Fetal Alert  Normal fetal echocardiogram.  Ms. Mulligan had device closure of an atrial septal defect by Dr. Simon Walton. Ms. Mulligan has a child with a VSD, which has closed spontaneously.  In light of the above family history I would recommend a non-urgent outpatient  cardiology follow-up within 4 -6 weeks after delivery, or sooner, if there are any clinical cardiac concerns  Janice Lewis RN,MSN 10.6.2020

## 2021-01-04 NOTE — OB PROVIDER TRIAGE NOTE - HISTORY OF PRESENT ILLNESS
31yo female  @ 36.3 wks SLIUP GDMA2 here from clinic for rule out ROM. Pt reports she presented to the clinic 2 weeks ago for a one time episode of feeling wet. Pt reports she was examined and ruled out for ROM but today she had a sonogram that revealed an DANN of 4.2. Pt denies any sensation of feeling wet since then. Pt reports GFM and denies any VB/ctx's.    Pmhx-hx ASD followed by Dr Pride  Pshx/[id number] repair; ASD repair-; -abdominoplasty; 2018-breast reduction  Meds-insulin 20u QHS; ASA   Past ob-TOP x 1               2007-35 wks  for pre-eclampsia-4#8               3/21/2014-7#5 Rpt  7#5; GDMA1  Gyn-denies

## 2021-01-04 NOTE — DISCHARGE NOTE ANTEPARTUM - PROVIDER TOKENS
FREE:[LAST:[[facility]],PHONE:[phone]],FAX:[(   )    -],ADDRESS:[Oncology Building  Floor C]]

## 2021-01-04 NOTE — OB PROVIDER H&P - NSHPPHYSICALEXAM_GEN_ALL_CORE
Gen: A&O x 3; NAD  Vitals: BP-124/78; P-100; T-36.8    Pulm-CTA B/L; no wheezes  Cor-clear S1S2; no murmurs  Abd exam-soft and nontender    SSE-os appears closed. Negative pooling/ neg nitrazine; negative ferning  Amnisure performed and is negative    TAS-see ATU report. DANN 4.2 cm with no 2 x 2 pocket of fluid    NST cat I with accels and mod variability; no ctx's

## 2021-01-04 NOTE — OB PROVIDER H&P - ASSESSMENT
29yo  @ 36.3 wks SLIUP GDMA2 here from the clinic for r/o ROM ryan to DANN of 4.2 cm with no 2 x 2 pocket  -SSE is negative for ROM and amnisure is neg  -pt is a previous  x 2  -pt was dw Dr Hopkins and Dr Park. Pt was admitted for rpt   -fetus with normal fetal echo but recommendation for pediatric cariac follow up  -pt was crossed for 2 units   ------------------------  -Normal fetal echocardiogram.  Ms. Mulligan had device closure of an atrial septal defect by Dr. Simon Walton. Ms. Mulligan has a child with a VSD, which has closed spontaneously.  In light of the above family history I would recommend a non-urgent outpatient  cardiology follow-up within 4 -6 weeks after delivery, or sooner, if there are any clinical cardiac concerns  Janice Lewis RN,MSN 10.6.2020

## 2021-01-04 NOTE — DISCHARGE NOTE ANTEPARTUM - CARE PROVIDER_API CALL
[facility],   Oncology Building  Floor C  Phone: [phone]  Fax: (   )    -  Follow Up Time:

## 2021-01-04 NOTE — CHART NOTE - NSCHARTNOTEFT_GEN_A_CORE
Pt seen at bedside after chart review.     Upon review of the ATU ultrasound from high risk clinic today, the report lists: overall fluid is decreased but a 2x2cm pocket is noted. BPP is 8/8 and umbilical artery dopplers are within normal limits.  Due to finding of 2x2 pocket of fluid, patient does not meet criteria for oligohydramnios and does not require delivery at this time.  Other sources of oligohydramnios such as ROM were ruled out with the triage evaluation.     Patient counseled at bedside regarding ATU ultrasound report.  Pt understands fluid is overall low but considered normal and twice weekly ATU testing is the recommendation.  Pt states she is uncomfortable with going home as she had been anticipating delivery today and she is worried for fetal wellbeing.  Although NST reactive and BPP 8/8, pt reports she does not feel comfortable going home.    Abd bedside sono (performed with Dr Philip): BPP 8/8 with pocket of 3x4 cm seen and again a pocket of 2x3 cm on repeat exam    Plan discussed with patient and MFM is for pt to remain inpatient overnight and to have another ultrasound with MFM in the morning. At that time will discuss further management.    TLal PGY4  d/w Dr Dunn Pt seen at bedside after chart review.     Upon review of the ATU ultrasound from high risk clinic today, the report lists: overall fluid is decreased but a 2x2cm pocket is noted. BPP is 8/8 and umbilical artery dopplers are within normal limits.  Due to finding of 2x2 pocket of fluid, patient does not meet criteria for oligohydramnios and does not require delivery at this time.  Other sources of oligohydramnios such as ROM were ruled out with the triage evaluation.     Patient counseled at bedside regarding ATU ultrasound report.  Pt understands fluid is overall low but considered normal and twice weekly ATU testing is the recommendation.  Pt states she is uncomfortable with going home as she had been anticipating delivery today and she is worried for fetal wellbeing.  Although NST reactive and BPP 8/8, pt reports she does not feel comfortable going home.    Abd bedside sono (performed with Dr Philip): BPP 8/8 with pocket of 3x4 cm seen and again a pocket of 2x3 cm on repeat exam    EFM: baseline 125 mod yasmine +accel no decel    Plan discussed with patient and MFM is for pt to remain inpatient overnight and to have another ultrasound with MFM in the morning. At that time will discuss further management. patient understands will not be proceeding with  at this time as delivery is not indicated    TLal PGY4  d/w Dr Dunn Pt seen at bedside after chart review.     Upon review of the ATU ultrasound from high risk clinic today, the report lists: overall fluid is decreased but a 2x2cm pocket is noted. BPP is 8/8 and umbilical artery dopplers are within normal limits.    Due to finding of 2x2 pocket of fluid, patient does not meet criteria for oligohydramnios and does not require delivery at this time.  Other sources of oligohydramnios such as ROM were ruled out with the triage evaluation.     Patient counseled at bedside regarding ATU ultrasound report.  Pt understands fluid is overall low but considered normal and twice weekly ATU testing is the recommendation.  Pt states she is uncomfortable with going home as she had been anticipating delivery today and she is worried for fetal wellbeing.  Although NST reactive and BPP 8/8, pt reports she does not feel comfortable going home.    Abd bedside sono (performed with Dr Philip): BPP 8/8 with pocket of 3x4 cm seen and again a pocket of 2x3 cm on repeat exam    EFM: baseline 125 mod yasmine +accel no decel    Plan discussed with patient and MFM is for pt to remain inpatient overnight and to have another ultrasound with MFM in the morning. At that time will discuss further management. patient understands will not be proceeding with  at this time as delivery is not indicated    TLal PGY4  d/w Dr Dunn Pt seen at bedside after chart review.     Upon review of the ATU ultrasound from high risk clinic today, the report lists: overall fluid is decreased but a 2x2cm pocket is noted. BPP is 8/8 and umbilical artery dopplers are within normal limits.    Due to finding of 2x2 pocket of fluid, patient does not meet criteria for oligohydramnios and does not require delivery at this time.  Other sources of oligohydramnios such as ROM were ruled out with the triage evaluation.     Patient counseled at bedside regarding ATU ultrasound report.  Pt understands fluid is overall decreased but considered normal due to presence of pocket measuring at least 2x2cm. twice weekly ATU testing is the recommendation with the current ultrasound findings.  Pt states she is uncomfortable with going home as she had been anticipating delivery today and she is worried for fetal wellbeing.  Although NST reactive and BPP 8/8, pt reports she does not feel comfortable going home.    Abd bedside sono (performed with Dr Philip): BPP 8/8 with pocket of 3x4 cm seen and again a pocket of 2x3 cm on repeat exam    EFM: baseline 125 mod yasmine +accel no decel    Plan discussed with patient and MFM is for pt to remain inpatient overnight and to have another ultrasound with MFM in the morning. At that time will discuss further management. patient understands will not be proceeding with  at this time as delivery is not indicated    TLal PGY4  d/w Dr Dunn

## 2021-01-04 NOTE — DISCHARGE NOTE ANTEPARTUM - MEDICATION SUMMARY - MEDICATIONS TO TAKE
I will START or STAY ON the medications listed below when I get home from the hospital:    aspirin  -- 81  by mouth once a day  -- Indication: For Home medication    insulin  -- Indication: For Home medication    PNV Prenatal oral tablet  -- 1 tab(s) by mouth once a day  -- Indication: For Home medication

## 2021-01-04 NOTE — CHART NOTE - NSCHARTNOTEFT_GEN_A_CORE
Attending Note     Was asked to  speak to pt regarding plan of care   29 yo  at 36w 3 days with diabetes on insulin qhs who was sent from L & D from clinic to rule out PPROM  Amnio sure negative. She was ruled out for PPROM. Spoke to Dr. Green who saw pt in clinic and rescanned her that showed with pocket greater DANN > 2 cm. MFM fellow saw pt and rescanned pt with deepest pocket > 2 cm  I had a lengthy discussion with the patient and patient's partner. Based upon ultrasound results, do not recommend delivery today. As per MFM, recommend twice weekly testing and has clinic appointment on 2020    DEVENDRA Hernandez MD

## 2021-01-04 NOTE — OB PROVIDER TRIAGE NOTE - PMH
ASD (atrial septal defect)  H/o  Diabetes mellitus  gestational  Hernia  h/o  History of termination of pregnancy  x1  HTN (hypertension)  h/o  1st pregnancy  Shingles  h/o 2007

## 2021-01-04 NOTE — DISCHARGE NOTE ANTEPARTUM - ADDITIONAL INSTRUCTIONS
Follow up with ATU for NST/BPP on Thursday. An email has been sent to the ATU, they will call you to schedule. If you do not receive a call from them by Tuesday, please call them to facilitate timely scheduling. Follow up with [facility] on Monday as scheduled.

## 2021-01-04 NOTE — OB PROVIDER H&P - NSLASTDATEVISIT_OBGYN_ALL_OB
At [facility], one important tool we use to improve our patient services is our Patient Survey.  Following your visit you may receive our survey in the mail.    Please take the time to complete the survey.    If your visit with us was great, we want to hear about it.    If we can improve, please let us know how.          Unknown

## 2021-01-04 NOTE — DISCHARGE NOTE ANTEPARTUM - PATIENT PORTAL LINK FT
You can access the FollowMyHealth Patient Portal offered by [facility] by registering at the following website: [email or web address]. By joining [employer] [facility]’s FollowMyHealth portal, you will also be able to view your health information using other applications (apps) compatible with our system.

## 2021-01-04 NOTE — DISCHARGE NOTE ANTEPARTUM - PLAN OF CARE
- twice weekly antepartum testing  - returned to triage if concerned for loss of fluid, vaginal bleeding, contractions, or decreased fetal movement outpatient follow-up - twice weekly antepartum testing with NST and BPP  - ATU has been emailed, they will call you to facilitate scheduling. If you do not hear from the ATU by Tuesday at the end of the day, please call to schedule for Thursday.  - returned to triage if concerned for loss of fluid, vaginal bleeding, contractions, or decreased fetal movement

## 2021-01-05 PROBLEM — Z87.42 PERSONAL HISTORY OF OTHER DISEASES OF THE FEMALE GENITAL TRACT: Chronic | Status: ACTIVE | Noted: 2021-01-04

## 2021-01-05 LAB
SARS-COV-2 IGG SERPL QL IA: NEGATIVE — SIGNIFICANT CHANGE UP
SARS-COV-2 IGM SERPL IA-ACNC: 0.01 INDEX — SIGNIFICANT CHANGE UP
T PALLIDUM AB TITR SER: NEGATIVE — SIGNIFICANT CHANGE UP

## 2021-01-07 ENCOUNTER — APPOINTMENT (OUTPATIENT)
Dept: ANTEPARTUM | Facility: CLINIC | Age: 31
End: 2021-01-07
Payer: MEDICAID

## 2021-01-07 ENCOUNTER — ASOB RESULT (OUTPATIENT)
Age: 31
End: 2021-01-07

## 2021-01-07 ENCOUNTER — APPOINTMENT (OUTPATIENT)
Dept: ANTEPARTUM | Facility: HOSPITAL | Age: 31
End: 2021-01-07

## 2021-01-07 PROCEDURE — 76816 OB US FOLLOW-UP PER FETUS: CPT | Mod: 26

## 2021-01-07 PROCEDURE — 76818 FETAL BIOPHYS PROFILE W/NST: CPT | Mod: 26

## 2021-01-08 ENCOUNTER — OUTPATIENT (OUTPATIENT)
Dept: INPATIENT UNIT | Facility: HOSPITAL | Age: 31
LOS: 1 days | Discharge: HOME | End: 2021-01-08
Payer: MEDICAID

## 2021-01-08 VITALS
HEART RATE: 91 BPM | RESPIRATION RATE: 18 BRPM | TEMPERATURE: 98 F | DIASTOLIC BLOOD PRESSURE: 77 MMHG | SYSTOLIC BLOOD PRESSURE: 124 MMHG

## 2021-01-08 DIAGNOSIS — Q21.1 ATRIAL SEPTAL DEFECT: Chronic | ICD-10-CM

## 2021-01-08 DIAGNOSIS — O26.899 OTHER SPECIFIED PREGNANCY RELATED CONDITIONS, UNSPECIFIED TRIMESTER: ICD-10-CM

## 2021-01-08 DIAGNOSIS — Z98.890 OTHER SPECIFIED POSTPROCEDURAL STATES: Chronic | ICD-10-CM

## 2021-01-08 DIAGNOSIS — Z3A.00 WEEKS OF GESTATION OF PREGNANCY NOT SPECIFIED: ICD-10-CM

## 2021-01-08 DIAGNOSIS — O14.90 UNSPECIFIED PRE-ECLAMPSIA, UNSPECIFIED TRIMESTER: Chronic | ICD-10-CM

## 2021-01-08 DIAGNOSIS — K46.9 UNSPECIFIED ABDOMINAL HERNIA WITHOUT OBSTRUCTION OR GANGRENE: Chronic | ICD-10-CM

## 2021-01-08 DIAGNOSIS — Z98.89 OTHER SPECIFIED POSTPROCEDURAL STATES: Chronic | ICD-10-CM

## 2021-01-08 PROCEDURE — 99205 OFFICE O/P NEW HI 60 MIN: CPT

## 2021-01-08 NOTE — OB PROVIDER TRIAGE NOTE - HISTORY OF PRESENT ILLNESS
pt presents with c/o painful uterine contractions every 7-10 minutes , reports pain6/10 on pain scale since this evening   reports +FM   denies any LOF or VB     last po: [id number]- sloppy carolee and FF   scheduled rpt  2021    allergies:   latex- rash- edema   reglan - anxious  morphine- severe itchiness  med hx:    ASD repair  followed by Dr Krishnan   surg hx:    abdominoplasty   2007 primary  @ 34 wks, PEC, Magnesium 4#8  3/21/2014 rpt  GDMA1 7#   bilateral inguinal hernia   breast " lift"   gyn hx: denies  ob hx:   2007 primary  @ 34 wks, PEC, Magnesium 4#8  3/21/2014 rpt  GDMA1 7#     ap care comp by :   GDMA2- Humulin N 20 units  PNV   scheduled  rpt  2021   Pt. is a 31y/o  EGA 37wks reports of fundal pain near her ribs for the past few hours but now reports of lower abdominal cramping. Pt. denies leakage of fluid and vaginal bleeding. Pt. reports good fetal movement. Pt. is scheduled for repeat  2020.      AP: GDMA2  Medical Hx:  B/l inguinal hernia  Surgical Hx:   ASD repair 2002 by Dr. Cortez  Abdominoplasty 2018  Breast Lift 2019   OBGYN Hx:   Primary  2007 @34wks 4#8 PEC/Mag  Repeat  3/21/2014 GDMA1 7#0  Meds: Humulin 20units at night  Allergies:  Latex-Rash  Morphine-Pruritus   Reglan-Anxiety

## 2021-01-08 NOTE — OB RN TRIAGE NOTE - PMH
ASD (atrial septal defect)  H/o  Diabetes mellitus  gestational  Hernia  h/o  History of termination of pregnancy  x1  HTN (hypertension)  h/o  1st pregnancy  Shingles  h/o 2007   ASD (atrial septal defect)  H/o  Diabetes mellitus  gestational, humalin 20u @bedtime  Hernia  h/o  History of termination of pregnancy  x1  HTN (hypertension)  h/o  1st pregnancy  Shingles  h/o 2007

## 2021-01-08 NOTE — OB PROVIDER TRIAGE NOTE - NSHPPHYSICALEXAM_GEN_ALL_CORE
BP: 124/77, HR: 95, Temp: 36.8  Abdomen soft nontender  SVE: Long/Closed  TAS: Cephalic presentation, fundal placenta, DANN: 10.99, BPP:8/8  FHR: 150bpm, moderate variability, accelerations, no decelerations  Irritability noted on TOCO

## 2021-01-08 NOTE — OB PROVIDER TRIAGE NOTE - PMH
ASD (atrial septal defect)  H/o  Hernia  h/o  History of termination of pregnancy  x1  HTN (hypertension)  h/o  1st pregnancy

## 2021-01-08 NOTE — OB PROVIDER TRIAGE NOTE - ADDITIONAL INSTRUCTIONS
Pt. d/c'd home. Pt. to follow up with next OB appointment. Pt. instructed to return to triage with increase abdominal contractions, leakage of fluid, vaginal bleeding or decrease fetal movement. Increase PO hydration encouraged.

## 2021-01-08 NOTE — OB PROVIDER TRIAGE NOTE - NSOBPROVIDERNOTE_OBGYN_ALL_OB_FT
Discussed findings with Dr. Zuinga/Dr. Hernandez. Pt. d/c'd home. Pt. to follow up with next OB appointment. Pt. instructed to return to triage with increase abdominal contractions, leakage of fluid, vaginal bleeding or decrease fetal movement. Increase PO hydration encouraged.

## 2021-01-09 VITALS — HEART RATE: 95 BPM | OXYGEN SATURATION: 97 %

## 2021-01-09 RX ORDER — ACETAMINOPHEN 500 MG
975 TABLET ORAL ONCE
Refills: 0 | Status: COMPLETED | OUTPATIENT
Start: 2021-01-09 | End: 2021-01-09

## 2021-01-09 RX ADMIN — Medication 975 MILLIGRAM(S): at 01:17

## 2021-01-11 ENCOUNTER — RESULT REVIEW (OUTPATIENT)
Age: 31
End: 2021-01-11

## 2021-01-11 ENCOUNTER — NON-APPOINTMENT (OUTPATIENT)
Age: 31
End: 2021-01-11

## 2021-01-11 ENCOUNTER — APPOINTMENT (OUTPATIENT)
Dept: ANTEPARTUM | Facility: CLINIC | Age: 31
End: 2021-01-11
Payer: MEDICAID

## 2021-01-11 ENCOUNTER — ASOB RESULT (OUTPATIENT)
Age: 31
End: 2021-01-11

## 2021-01-11 ENCOUNTER — APPOINTMENT (OUTPATIENT)
Dept: OBGYN | Facility: HOSPITAL | Age: 31
End: 2021-01-11

## 2021-01-11 ENCOUNTER — APPOINTMENT (OUTPATIENT)
Dept: ANTEPARTUM | Facility: CLINIC | Age: 31
End: 2021-01-11

## 2021-01-11 ENCOUNTER — OUTPATIENT (OUTPATIENT)
Dept: OUTPATIENT SERVICES | Facility: HOSPITAL | Age: 31
LOS: 1 days | End: 2021-01-11

## 2021-01-11 VITALS
HEART RATE: 87 BPM | BODY MASS INDEX: 33.99 KG/M2 | TEMPERATURE: 98.2 F | SYSTOLIC BLOOD PRESSURE: 109 MMHG | WEIGHT: 217 LBS | DIASTOLIC BLOOD PRESSURE: 75 MMHG

## 2021-01-11 DIAGNOSIS — Z01.818 ENCOUNTER FOR OTHER PREPROCEDURAL EXAMINATION: ICD-10-CM

## 2021-01-11 DIAGNOSIS — Z98.890 OTHER SPECIFIED POSTPROCEDURAL STATES: Chronic | ICD-10-CM

## 2021-01-11 DIAGNOSIS — Q21.1 ATRIAL SEPTAL DEFECT: Chronic | ICD-10-CM

## 2021-01-11 DIAGNOSIS — R80.9 PROTEINURIA, UNSPECIFIED: ICD-10-CM

## 2021-01-11 DIAGNOSIS — Q21.1 ATRIAL SEPTAL DEFECT: ICD-10-CM

## 2021-01-11 DIAGNOSIS — O24.419 GESTATIONAL DIABETES MELLITUS IN PREGNANCY, UNSPECIFIED CONTROL: ICD-10-CM

## 2021-01-11 DIAGNOSIS — Z98.89 OTHER SPECIFIED POSTPROCEDURAL STATES: Chronic | ICD-10-CM

## 2021-01-11 DIAGNOSIS — D64.9 ANEMIA, UNSPECIFIED: ICD-10-CM

## 2021-01-11 DIAGNOSIS — Z34.80 ENCOUNTER FOR SUPERVISION OF OTHER NORMAL PREGNANCY, UNSPECIFIED TRIMESTER: ICD-10-CM

## 2021-01-11 DIAGNOSIS — O21.9 VOMITING OF PREGNANCY, UNSPECIFIED: ICD-10-CM

## 2021-01-11 DIAGNOSIS — O14.90 UNSPECIFIED PRE-ECLAMPSIA, UNSPECIFIED TRIMESTER: Chronic | ICD-10-CM

## 2021-01-11 DIAGNOSIS — K46.9 UNSPECIFIED ABDOMINAL HERNIA WITHOUT OBSTRUCTION OR GANGRENE: Chronic | ICD-10-CM

## 2021-01-11 LAB
ALBUMIN SERPL ELPH-MCNC: 3.4 G/DL — SIGNIFICANT CHANGE UP (ref 3.3–5)
ALP SERPL-CCNC: 215 U/L — HIGH (ref 40–120)
ALT FLD-CCNC: 8 U/L — SIGNIFICANT CHANGE UP (ref 4–33)
ANION GAP SERPL CALC-SCNC: 10 MMOL/L — SIGNIFICANT CHANGE UP (ref 7–14)
AST SERPL-CCNC: 13 U/L — SIGNIFICANT CHANGE UP (ref 4–32)
BASOPHILS # BLD AUTO: 0.03 K/UL — SIGNIFICANT CHANGE UP (ref 0–0.2)
BASOPHILS NFR BLD AUTO: 0.3 % — SIGNIFICANT CHANGE UP (ref 0–2)
BILIRUB SERPL-MCNC: 0.3 MG/DL — SIGNIFICANT CHANGE UP (ref 0.2–1.2)
BUN SERPL-MCNC: 9 MG/DL — SIGNIFICANT CHANGE UP (ref 7–23)
CALCIUM SERPL-MCNC: 9.3 MG/DL — SIGNIFICANT CHANGE UP (ref 8.4–10.5)
CHLORIDE SERPL-SCNC: 103 MMOL/L — SIGNIFICANT CHANGE UP (ref 98–107)
CO2 SERPL-SCNC: 24 MMOL/L — SIGNIFICANT CHANGE UP (ref 22–31)
CREAT SERPL-MCNC: 0.63 MG/DL — SIGNIFICANT CHANGE UP (ref 0.5–1.3)
EOSINOPHIL # BLD AUTO: 0.27 K/UL — SIGNIFICANT CHANGE UP (ref 0–0.5)
EOSINOPHIL NFR BLD AUTO: 3.1 % — SIGNIFICANT CHANGE UP (ref 0–6)
GLUCOSE SERPL-MCNC: 82 MG/DL — SIGNIFICANT CHANGE UP (ref 70–99)
HCT VFR BLD CALC: 31.9 % — LOW (ref 34.5–45)
HGB BLD-MCNC: 9.8 G/DL — LOW (ref 11.5–15.5)
IANC: 6.59 K/UL — SIGNIFICANT CHANGE UP (ref 1.5–8.5)
IMM GRANULOCYTES NFR BLD AUTO: 0.8 % — SIGNIFICANT CHANGE UP (ref 0–1.5)
LYMPHOCYTES # BLD AUTO: 1.47 K/UL — SIGNIFICANT CHANGE UP (ref 1–3.3)
LYMPHOCYTES # BLD AUTO: 16.6 % — SIGNIFICANT CHANGE UP (ref 13–44)
MCHC RBC-ENTMCNC: 24.4 PG — LOW (ref 27–34)
MCHC RBC-ENTMCNC: 30.7 GM/DL — LOW (ref 32–36)
MCV RBC AUTO: 79.4 FL — LOW (ref 80–100)
MONOCYTES # BLD AUTO: 0.41 K/UL — SIGNIFICANT CHANGE UP (ref 0–0.9)
MONOCYTES NFR BLD AUTO: 4.6 % — SIGNIFICANT CHANGE UP (ref 2–14)
NEUTROPHILS # BLD AUTO: 6.59 K/UL — SIGNIFICANT CHANGE UP (ref 1.8–7.4)
NEUTROPHILS NFR BLD AUTO: 74.6 % — SIGNIFICANT CHANGE UP (ref 43–77)
NRBC # BLD: 0 /100 WBCS — SIGNIFICANT CHANGE UP
NRBC # FLD: 0 K/UL — SIGNIFICANT CHANGE UP
PLATELET # BLD AUTO: 311 K/UL — SIGNIFICANT CHANGE UP (ref 150–400)
POTASSIUM SERPL-MCNC: 3.8 MMOL/L — SIGNIFICANT CHANGE UP (ref 3.5–5.3)
POTASSIUM SERPL-SCNC: 3.8 MMOL/L — SIGNIFICANT CHANGE UP (ref 3.5–5.3)
PROT SERPL-MCNC: 6.8 G/DL — SIGNIFICANT CHANGE UP (ref 6–8.3)
RBC # BLD: 4.02 M/UL — SIGNIFICANT CHANGE UP (ref 3.8–5.2)
RBC # FLD: 15.7 % — HIGH (ref 10.3–14.5)
SODIUM SERPL-SCNC: 137 MMOL/L — SIGNIFICANT CHANGE UP (ref 135–145)
URATE SERPL-MCNC: 3.4 MG/DL — SIGNIFICANT CHANGE UP (ref 2.5–7)
WBC # BLD: 8.84 K/UL — SIGNIFICANT CHANGE UP (ref 3.8–10.5)
WBC # FLD AUTO: 8.84 K/UL — SIGNIFICANT CHANGE UP (ref 3.8–10.5)

## 2021-01-11 PROCEDURE — 76818 FETAL BIOPHYS PROFILE W/NST: CPT | Mod: 26

## 2021-01-12 ENCOUNTER — OUTPATIENT (OUTPATIENT)
Dept: OUTPATIENT SERVICES | Facility: HOSPITAL | Age: 31
LOS: 1 days | End: 2021-01-12

## 2021-01-12 ENCOUNTER — APPOINTMENT (OUTPATIENT)
Dept: DISASTER EMERGENCY | Facility: CLINIC | Age: 31
End: 2021-01-12

## 2021-01-12 ENCOUNTER — NON-APPOINTMENT (OUTPATIENT)
Age: 31
End: 2021-01-12

## 2021-01-12 VITALS
HEART RATE: 74 BPM | TEMPERATURE: 98 F | RESPIRATION RATE: 16 BRPM | OXYGEN SATURATION: 98 % | SYSTOLIC BLOOD PRESSURE: 120 MMHG | DIASTOLIC BLOOD PRESSURE: 70 MMHG

## 2021-01-12 DIAGNOSIS — O24.419 GESTATIONAL DIABETES MELLITUS IN PREGNANCY, UNSPECIFIED CONTROL: ICD-10-CM

## 2021-01-12 DIAGNOSIS — Z34.90 ENCOUNTER FOR SUPERVISION OF NORMAL PREGNANCY, UNSPECIFIED, UNSPECIFIED TRIMESTER: ICD-10-CM

## 2021-01-12 DIAGNOSIS — Z98.890 OTHER SPECIFIED POSTPROCEDURAL STATES: Chronic | ICD-10-CM

## 2021-01-12 DIAGNOSIS — Q21.1 ATRIAL SEPTAL DEFECT: Chronic | ICD-10-CM

## 2021-01-12 DIAGNOSIS — Z98.89 OTHER SPECIFIED POSTPROCEDURAL STATES: Chronic | ICD-10-CM

## 2021-01-12 DIAGNOSIS — K46.9 UNSPECIFIED ABDOMINAL HERNIA WITHOUT OBSTRUCTION OR GANGRENE: Chronic | ICD-10-CM

## 2021-01-12 DIAGNOSIS — O14.90 UNSPECIFIED PRE-ECLAMPSIA, UNSPECIFIED TRIMESTER: Chronic | ICD-10-CM

## 2021-01-12 LAB
ANION GAP SERPL CALC-SCNC: 11 MMOL/L — SIGNIFICANT CHANGE UP (ref 7–14)
APPEARANCE UR: ABNORMAL
BACTERIA # UR AUTO: ABNORMAL
BILIRUB UR-MCNC: NEGATIVE — SIGNIFICANT CHANGE UP
BLD GP AB SCN SERPL QL: NEGATIVE — SIGNIFICANT CHANGE UP
BUN SERPL-MCNC: 7 MG/DL — SIGNIFICANT CHANGE UP (ref 7–23)
C TRACH RRNA SPEC QL NAA+PROBE: SIGNIFICANT CHANGE UP
CALCIUM SERPL-MCNC: 9.5 MG/DL — SIGNIFICANT CHANGE UP (ref 8.4–10.5)
CHLORIDE SERPL-SCNC: 104 MMOL/L — SIGNIFICANT CHANGE UP (ref 98–107)
CO2 SERPL-SCNC: 21 MMOL/L — LOW (ref 22–31)
COLOR SPEC: YELLOW — SIGNIFICANT CHANGE UP
CREAT SERPL-MCNC: 0.53 MG/DL — SIGNIFICANT CHANGE UP (ref 0.5–1.3)
DIFF PNL FLD: NEGATIVE — SIGNIFICANT CHANGE UP
EPI CELLS # UR: 17 /HPF — HIGH (ref 0–5)
GLUCOSE SERPL-MCNC: 71 MG/DL — SIGNIFICANT CHANGE UP (ref 70–99)
GLUCOSE UR QL: NEGATIVE — SIGNIFICANT CHANGE UP
HCT VFR BLD CALC: 32.4 % — LOW (ref 34.5–45)
HGB BLD-MCNC: 9.6 G/DL — LOW (ref 11.5–15.5)
HYALINE CASTS # UR AUTO: 0 /LPF — SIGNIFICANT CHANGE UP (ref 0–7)
KETONES UR-MCNC: NEGATIVE — SIGNIFICANT CHANGE UP
LEUKOCYTE ESTERASE UR-ACNC: ABNORMAL
MCHC RBC-ENTMCNC: 23.8 PG — LOW (ref 27–34)
MCHC RBC-ENTMCNC: 29.6 GM/DL — LOW (ref 32–36)
MCV RBC AUTO: 80.2 FL — SIGNIFICANT CHANGE UP (ref 80–100)
N GONORRHOEA RRNA SPEC QL NAA+PROBE: SIGNIFICANT CHANGE UP
NITRITE UR-MCNC: NEGATIVE — SIGNIFICANT CHANGE UP
NRBC # BLD: 0 /100 WBCS — SIGNIFICANT CHANGE UP
NRBC # FLD: 0 K/UL — SIGNIFICANT CHANGE UP
PH UR: 6.5 — SIGNIFICANT CHANGE UP (ref 5–8)
PLATELET # BLD AUTO: 317 K/UL — SIGNIFICANT CHANGE UP (ref 150–400)
POTASSIUM SERPL-MCNC: 4 MMOL/L — SIGNIFICANT CHANGE UP (ref 3.5–5.3)
POTASSIUM SERPL-SCNC: 4 MMOL/L — SIGNIFICANT CHANGE UP (ref 3.5–5.3)
PROT UR-MCNC: ABNORMAL
RBC # BLD: 4.04 M/UL — SIGNIFICANT CHANGE UP (ref 3.8–5.2)
RBC # FLD: 15.6 % — HIGH (ref 10.3–14.5)
RBC CASTS # UR COMP ASSIST: 4 /HPF — SIGNIFICANT CHANGE UP (ref 0–4)
RH IG SCN BLD-IMP: POSITIVE — SIGNIFICANT CHANGE UP
SODIUM SERPL-SCNC: 136 MMOL/L — SIGNIFICANT CHANGE UP (ref 135–145)
SP GR SPEC: 1.03 — HIGH (ref 1.01–1.02)
SPECIMEN SOURCE: SIGNIFICANT CHANGE UP
UROBILINOGEN FLD QL: ABNORMAL
WBC # BLD: 9.18 K/UL — SIGNIFICANT CHANGE UP (ref 3.8–10.5)
WBC # FLD AUTO: 9.18 K/UL — SIGNIFICANT CHANGE UP (ref 3.8–10.5)
WBC UR QL: 106 /HPF — HIGH (ref 0–5)

## 2021-01-12 RX ORDER — SODIUM CHLORIDE 9 MG/ML
1000 INJECTION, SOLUTION INTRAVENOUS
Refills: 0 | Status: DISCONTINUED | OUTPATIENT
Start: 2021-01-15 | End: 2021-01-16

## 2021-01-12 NOTE — OB PST NOTE - PMH
ASD (atrial septal defect)  H/o  GDM (gestational diabetes mellitus)    Hernia  h/o  History of termination of pregnancy  x1  HTN (hypertension)  h/o  1st pregnancy

## 2021-01-12 NOTE — OB PST NOTE - NSHPREVIEWOFSYSTEMS_GEN_ALL_CORE
General: No fever, chills,   Skin: No rashes, itching,  Breast: Normal   Ophthalmologic: No diplopia,   ENMT Symptoms: No hearing difficulty, ear pain, tinnitus, or vertigo. Pt c/o of recent onset of sinus symptoms, nasal congestion, nasal   discharge, and pressure   Respiratory and Thorax: No wheezing, dyspnea,   Cardiovascular: No chest pain, palpitations, or swelling of ankles - Hx of ASD repair 2002 - and followed by Cardio with last visit 12/20 - Echo and EKG in chart     Gastrointestinal: No nausea, vomiting,   Genitourinary/ Pelvis: No hematuria, renal colic, or flank pain. Pt denies vaginal bleeding     Musculoskeletal: Pt hx lower back pain - increased with pregnancy   Neurological: No transient paralysis, weakness, paresthesias, or seizures.     Psychiatric: No suicidal ideation, depression, anxiety,   Hematology: No gum bleeding, nose bleeding,   Lymphatic: No enlarged or tender lymph nodes.   Endocrine: No heat or cold intolerance    Immunologic: No recurrent or persistent infections

## 2021-01-12 NOTE — OB PST NOTE - HISTORY OF PRESENT ILLNESS
Pt is a 30 yr old female scheduled for Repeat  with dr Bustillos tentatively 1/15/21 - pt hx   with pre eclampsia at 35 weeks and  - Pt hx GDM - on Humulin N and ASA 81 mg po but pt has not taken in several weeks. Pt seen by OB Monday and fetal heartbeat and activity monitored. Pt c/o of sinus congestion for several days and to call OB/PCP for evaluation. Hx of ASD Repair  and has been followed by Dr Cortez at [facility] - Pt denies vaginal bleeding or contractions   Pt denies COVID or recent travel   Pt had COVID pre op test    Pt is a 30 yr old female scheduled for Repeat  with Dr Bustillos tentatively 1/15/21 - pt hx   with pre eclampsia at 35 weeks and  - Pt hx GDM - on Humulin N nightly and ASA 81 mg po which pt has not taken in several weeks. Pt seen by OB Monday and fetal heartbeat and activity monitored. Pt c/o of sinus congestion for several days and to call OB/PCP for evaluation. Hx of ASD Repair  and has been followed by Dr Cortez at [facility] - Pt denies vaginal bleeding or contractions - Pt states FBG in 80s to 100s - recent increase in Humulin N 24 u SC HS last night    Pt denies COVID or recent travel   Pt had COVID pre op test

## 2021-01-12 NOTE — OB PST NOTE - NSANTHOSAYNRD_GEN_A_CORE
No. MERRILL screening performed.  STOP BANG Legend: 0-2 = LOW Risk; 3-4 = INTERMEDIATE Risk; 5-8 = HIGH Risk

## 2021-01-12 NOTE — OB PST NOTE - PROBLEM SELECTOR PLAN 1
Pt scheduled for surgery and preop instructions including instructions and for Chlorhexidine use in showering on the day of surgery, given verbally and with use of  written materials, and patient confirming understanding of such instructions using  teach back method.  OR Booking notified of GDM and Latex allergy and ASD mesh   Echo and EKG in chart   Pt to confirm ASA and Insulin preop instructions with OB

## 2021-01-12 NOTE — OB PST NOTE - NSHPPHYSICALEXAM_GEN_ALL_CORE
Constitutional: Well Developed, Well Groomed, Well Nourished, No Distress    Eyes: PERRL, EOMI, conjunctiva clear    Ears: Normal    Mouth & Gums: Pt has reddened nasal mucosa and is congested  - Mouth and gums are normal    Neck: Supple, no JVD,  Breast: Normal shape,   Back: Normal shape, ROM intact, strength intact,   Respiratory: Airway patent, breath sounds equal, good air movement, respiration non-labored, clear to auscultation bilateral,     Cardiovascular:  Regular rate and rhythm, no rubs- Ausculation of  mild murmur   Gastrointestinal: Normal   Extremities: No clubbing, cyanosis, or pedal edema    Vascular:  Radial Pulse normal,   Neurological: alert & oriented x 3,   Skin: warm and dry, normal color    Lymph Nodes: normal posterior cervical lymph node, normal anterior cervical lymph node, normal supraclavicular lymph node,     Musculoskeletal: ROM intact, no joint swelling, warmth, or calf tenderness. Normal strength    Psychiatric: normal affect, normal behavior

## 2021-01-13 DIAGNOSIS — Q21.1 ATRIAL SEPTAL DEFECT: ICD-10-CM

## 2021-01-13 DIAGNOSIS — O09.299 SUPERVISION OF PREGNANCY WITH OTHER POOR REPRODUCTIVE OR OBSTETRIC HISTORY, UNSPECIFIED TRIMESTER: ICD-10-CM

## 2021-01-13 DIAGNOSIS — O24.419 GESTATIONAL DIABETES MELLITUS IN PREGNANCY, UNSPECIFIED CONTROL: ICD-10-CM

## 2021-01-13 LAB
CULTURE RESULTS: SIGNIFICANT CHANGE UP
SARS-COV-2 N GENE NPH QL NAA+PROBE: NOT DETECTED
SPECIMEN SOURCE: SIGNIFICANT CHANGE UP

## 2021-01-14 ENCOUNTER — TRANSCRIPTION ENCOUNTER (OUTPATIENT)
Age: 31
End: 2021-01-14

## 2021-01-14 ENCOUNTER — APPOINTMENT (OUTPATIENT)
Dept: ANTEPARTUM | Facility: CLINIC | Age: 31
End: 2021-01-14

## 2021-01-14 DIAGNOSIS — Q21.1 ATRIAL SEPTAL DEFECT: ICD-10-CM

## 2021-01-14 DIAGNOSIS — O09.299 SUPERVISION OF PREGNANCY WITH OTHER POOR REPRODUCTIVE OR OBSTETRIC HISTORY, UNSPECIFIED TRIMESTER: ICD-10-CM

## 2021-01-14 DIAGNOSIS — O09.90 SUPERVISION OF HIGH RISK PREGNANCY, UNSPECIFIED, UNSPECIFIED TRIMESTER: ICD-10-CM

## 2021-01-15 ENCOUNTER — INPATIENT (INPATIENT)
Facility: HOSPITAL | Age: 31
LOS: 2 days | Discharge: HOME | End: 2021-01-18
Attending: SPECIALIST | Admitting: SPECIALIST
Payer: MEDICAID

## 2021-01-15 VITALS — TEMPERATURE: 98 F

## 2021-01-15 DIAGNOSIS — Z98.89 OTHER SPECIFIED POSTPROCEDURAL STATES: Chronic | ICD-10-CM

## 2021-01-15 DIAGNOSIS — K46.9 UNSPECIFIED ABDOMINAL HERNIA WITHOUT OBSTRUCTION OR GANGRENE: Chronic | ICD-10-CM

## 2021-01-15 DIAGNOSIS — Z98.890 OTHER SPECIFIED POSTPROCEDURAL STATES: Chronic | ICD-10-CM

## 2021-01-15 DIAGNOSIS — O24.414 GESTATIONAL DIABETES MELLITUS IN PREGNANCY, INSULIN CONTROLLED: ICD-10-CM

## 2021-01-15 DIAGNOSIS — Q21.1 ATRIAL SEPTAL DEFECT: ICD-10-CM

## 2021-01-15 DIAGNOSIS — O14.90 UNSPECIFIED PRE-ECLAMPSIA, UNSPECIFIED TRIMESTER: Chronic | ICD-10-CM

## 2021-01-15 DIAGNOSIS — Z98.891 HISTORY OF UTERINE SCAR FROM PREVIOUS SURGERY: ICD-10-CM

## 2021-01-15 DIAGNOSIS — O24.419 GESTATIONAL DIABETES MELLITUS IN PREGNANCY, UNSPECIFIED CONTROL: ICD-10-CM

## 2021-01-15 DIAGNOSIS — Q21.1 ATRIAL SEPTAL DEFECT: Chronic | ICD-10-CM

## 2021-01-15 LAB
BASOPHILS # BLD AUTO: 0.04 K/UL — SIGNIFICANT CHANGE UP (ref 0–0.2)
BASOPHILS NFR BLD AUTO: 0.4 % — SIGNIFICANT CHANGE UP (ref 0–2)
BLD GP AB SCN SERPL QL: NEGATIVE — SIGNIFICANT CHANGE UP
EOSINOPHIL # BLD AUTO: 0.3 K/UL — SIGNIFICANT CHANGE UP (ref 0–0.5)
EOSINOPHIL NFR BLD AUTO: 3.2 % — SIGNIFICANT CHANGE UP (ref 0–6)
HCT VFR BLD CALC: 30.6 % — LOW (ref 34.5–45)
HGB BLD-MCNC: 9.3 G/DL — LOW (ref 11.5–15.5)
IANC: 6.72 K/UL — SIGNIFICANT CHANGE UP (ref 1.5–8.5)
IMM GRANULOCYTES NFR BLD AUTO: 0.5 % — SIGNIFICANT CHANGE UP (ref 0–1.5)
LYMPHOCYTES # BLD AUTO: 1.86 K/UL — SIGNIFICANT CHANGE UP (ref 1–3.3)
LYMPHOCYTES # BLD AUTO: 19.9 % — SIGNIFICANT CHANGE UP (ref 13–44)
MCHC RBC-ENTMCNC: 23.6 PG — LOW (ref 27–34)
MCHC RBC-ENTMCNC: 30.4 GM/DL — LOW (ref 32–36)
MCV RBC AUTO: 77.7 FL — LOW (ref 80–100)
MONOCYTES # BLD AUTO: 0.38 K/UL — SIGNIFICANT CHANGE UP (ref 0–0.9)
MONOCYTES NFR BLD AUTO: 4.1 % — SIGNIFICANT CHANGE UP (ref 2–14)
NEUTROPHILS # BLD AUTO: 6.72 K/UL — SIGNIFICANT CHANGE UP (ref 1.8–7.4)
NEUTROPHILS NFR BLD AUTO: 71.9 % — SIGNIFICANT CHANGE UP (ref 43–77)
NRBC # BLD: 0 /100 WBCS — SIGNIFICANT CHANGE UP
NRBC # FLD: 0 K/UL — SIGNIFICANT CHANGE UP
PLATELET # BLD AUTO: 289 K/UL — SIGNIFICANT CHANGE UP (ref 150–400)
RBC # BLD: 3.94 M/UL — SIGNIFICANT CHANGE UP (ref 3.8–5.2)
RBC # FLD: 15.5 % — HIGH (ref 10.3–14.5)
RH IG SCN BLD-IMP: POSITIVE — SIGNIFICANT CHANGE UP
SARS-COV-2 IGG SERPL QL IA: NEGATIVE — SIGNIFICANT CHANGE UP
SARS-COV-2 IGM SERPL IA-ACNC: 0.07 INDEX — SIGNIFICANT CHANGE UP
WBC # BLD: 9.35 K/UL — SIGNIFICANT CHANGE UP (ref 3.8–10.5)
WBC # FLD AUTO: 9.35 K/UL — SIGNIFICANT CHANGE UP (ref 3.8–10.5)

## 2021-01-15 PROCEDURE — 59514 CESAREAN DELIVERY ONLY: CPT | Mod: U9,UB,GC

## 2021-01-15 RX ORDER — TETANUS TOXOID, REDUCED DIPHTHERIA TOXOID AND ACELLULAR PERTUSSIS VACCINE, ADSORBED 5; 2.5; 8; 8; 2.5 [IU]/.5ML; [IU]/.5ML; UG/.5ML; UG/.5ML; UG/.5ML
0.5 SUSPENSION INTRAMUSCULAR ONCE
Refills: 0 | Status: DISCONTINUED | OUTPATIENT
Start: 2021-01-15 | End: 2021-01-18

## 2021-01-15 RX ORDER — OXYTOCIN 10 UNIT/ML
333.33 VIAL (ML) INJECTION
Qty: 20 | Refills: 0 | Status: DISCONTINUED | OUTPATIENT
Start: 2021-01-15 | End: 2021-01-18

## 2021-01-15 RX ORDER — NALBUPHINE HYDROCHLORIDE 10 MG/ML
1.25 INJECTION, SOLUTION INTRAMUSCULAR; INTRAVENOUS; SUBCUTANEOUS ONCE
Refills: 0 | Status: DISCONTINUED | OUTPATIENT
Start: 2021-01-15 | End: 2021-01-18

## 2021-01-15 RX ORDER — ACETAMINOPHEN 500 MG
1000 TABLET ORAL ONCE
Refills: 0 | Status: COMPLETED | OUTPATIENT
Start: 2021-01-15 | End: 2021-01-15

## 2021-01-15 RX ORDER — OXYCODONE HYDROCHLORIDE 5 MG/1
10 TABLET ORAL
Refills: 0 | Status: DISCONTINUED | OUTPATIENT
Start: 2021-01-15 | End: 2021-01-16

## 2021-01-15 RX ORDER — OXYCODONE HYDROCHLORIDE 5 MG/1
5 TABLET ORAL
Refills: 0 | Status: COMPLETED | OUTPATIENT
Start: 2021-01-15 | End: 2021-01-22

## 2021-01-15 RX ORDER — IBUPROFEN 200 MG
600 TABLET ORAL EVERY 6 HOURS
Refills: 0 | Status: COMPLETED | OUTPATIENT
Start: 2021-01-15 | End: 2021-12-14

## 2021-01-15 RX ORDER — NALBUPHINE HYDROCHLORIDE 10 MG/ML
1.25 INJECTION, SOLUTION INTRAMUSCULAR; INTRAVENOUS; SUBCUTANEOUS ONCE
Refills: 0 | Status: DISCONTINUED | OUTPATIENT
Start: 2021-01-15 | End: 2021-01-15

## 2021-01-15 RX ORDER — OXYCODONE HYDROCHLORIDE 5 MG/1
5 TABLET ORAL
Refills: 0 | Status: DISCONTINUED | OUTPATIENT
Start: 2021-01-15 | End: 2021-01-16

## 2021-01-15 RX ORDER — SIMETHICONE 80 MG/1
80 TABLET, CHEWABLE ORAL EVERY 4 HOURS
Refills: 0 | Status: DISCONTINUED | OUTPATIENT
Start: 2021-01-15 | End: 2021-01-18

## 2021-01-15 RX ORDER — NALOXONE HYDROCHLORIDE 4 MG/.1ML
0.1 SPRAY NASAL
Refills: 0 | Status: DISCONTINUED | OUTPATIENT
Start: 2021-01-15 | End: 2021-01-18

## 2021-01-15 RX ORDER — ONDANSETRON 8 MG/1
4 TABLET, FILM COATED ORAL EVERY 6 HOURS
Refills: 0 | Status: DISCONTINUED | OUTPATIENT
Start: 2021-01-15 | End: 2021-01-18

## 2021-01-15 RX ORDER — SODIUM CHLORIDE 9 MG/ML
1000 INJECTION, SOLUTION INTRAVENOUS ONCE
Refills: 0 | Status: COMPLETED | OUTPATIENT
Start: 2021-01-15 | End: 2021-01-15

## 2021-01-15 RX ORDER — KETOROLAC TROMETHAMINE 30 MG/ML
30 SYRINGE (ML) INJECTION EVERY 6 HOURS
Refills: 0 | Status: DISCONTINUED | OUTPATIENT
Start: 2021-01-15 | End: 2021-01-16

## 2021-01-15 RX ORDER — ASPIRIN/CALCIUM CARB/MAGNESIUM 324 MG
81 TABLET ORAL
Qty: 0 | Refills: 0 | DISCHARGE

## 2021-01-15 RX ORDER — MAGNESIUM HYDROXIDE 400 MG/1
30 TABLET, CHEWABLE ORAL
Refills: 0 | Status: DISCONTINUED | OUTPATIENT
Start: 2021-01-15 | End: 2021-01-18

## 2021-01-15 RX ORDER — FAMOTIDINE 10 MG/ML
20 INJECTION INTRAVENOUS ONCE
Refills: 0 | Status: COMPLETED | OUTPATIENT
Start: 2021-01-15 | End: 2021-01-15

## 2021-01-15 RX ORDER — DEXAMETHASONE 0.5 MG/5ML
4 ELIXIR ORAL ONCE
Refills: 0 | Status: DISCONTINUED | OUTPATIENT
Start: 2021-01-15 | End: 2021-01-18

## 2021-01-15 RX ORDER — OXYCODONE HYDROCHLORIDE 5 MG/1
5 TABLET ORAL ONCE
Refills: 0 | Status: DISCONTINUED | OUTPATIENT
Start: 2021-01-15 | End: 2021-01-18

## 2021-01-15 RX ORDER — INFLUENZA VIRUS VACCINE 15; 15; 15; 15 UG/.5ML; UG/.5ML; UG/.5ML; UG/.5ML
0.5 SUSPENSION INTRAMUSCULAR ONCE
Refills: 0 | Status: DISCONTINUED | OUTPATIENT
Start: 2021-01-15 | End: 2021-01-18

## 2021-01-15 RX ORDER — HEPARIN SODIUM 5000 [USP'U]/ML
5000 INJECTION INTRAVENOUS; SUBCUTANEOUS EVERY 12 HOURS
Refills: 0 | Status: DISCONTINUED | OUTPATIENT
Start: 2021-01-15 | End: 2021-01-18

## 2021-01-15 RX ORDER — LANOLIN
1 OINTMENT (GRAM) TOPICAL EVERY 6 HOURS
Refills: 0 | Status: DISCONTINUED | OUTPATIENT
Start: 2021-01-15 | End: 2021-01-18

## 2021-01-15 RX ORDER — CITRIC ACID/SODIUM CITRATE 300-500 MG
30 SOLUTION, ORAL ORAL ONCE
Refills: 0 | Status: COMPLETED | OUTPATIENT
Start: 2021-01-15 | End: 2021-01-15

## 2021-01-15 RX ORDER — ACETAMINOPHEN 500 MG
975 TABLET ORAL
Refills: 0 | Status: DISCONTINUED | OUTPATIENT
Start: 2021-01-15 | End: 2021-01-18

## 2021-01-15 RX ORDER — BUTORPHANOL TARTRATE 2 MG/ML
0.12 INJECTION, SOLUTION INTRAMUSCULAR; INTRAVENOUS EVERY 6 HOURS
Refills: 0 | Status: DISCONTINUED | OUTPATIENT
Start: 2021-01-15 | End: 2021-01-15

## 2021-01-15 RX ORDER — KETOROLAC TROMETHAMINE 30 MG/ML
30 SYRINGE (ML) INJECTION ONCE
Refills: 0 | Status: DISCONTINUED | OUTPATIENT
Start: 2021-01-15 | End: 2021-01-15

## 2021-01-15 RX ORDER — SODIUM CHLORIDE 9 MG/ML
1000 INJECTION, SOLUTION INTRAVENOUS
Refills: 0 | Status: DISCONTINUED | OUTPATIENT
Start: 2021-01-15 | End: 2021-01-18

## 2021-01-15 RX ADMIN — HEPARIN SODIUM 5000 UNIT(S): 5000 INJECTION INTRAVENOUS; SUBCUTANEOUS at 21:07

## 2021-01-15 RX ADMIN — SODIUM CHLORIDE 125 MILLILITER(S): 9 INJECTION, SOLUTION INTRAVENOUS at 12:13

## 2021-01-15 RX ADMIN — Medication 30 MILLIGRAM(S): at 23:24

## 2021-01-15 RX ADMIN — SODIUM CHLORIDE 2000 MILLILITER(S): 9 INJECTION, SOLUTION INTRAVENOUS at 12:13

## 2021-01-15 RX ADMIN — FAMOTIDINE 20 MILLIGRAM(S): 10 INJECTION INTRAVENOUS at 12:13

## 2021-01-15 RX ADMIN — Medication 30 MILLIGRAM(S): at 16:15

## 2021-01-15 RX ADMIN — Medication 400 MILLIGRAM(S): at 16:49

## 2021-01-15 RX ADMIN — Medication 30 MILLILITER(S): at 12:13

## 2021-01-15 RX ADMIN — BUTORPHANOL TARTRATE 0.12 MILLIGRAM(S): 2 INJECTION, SOLUTION INTRAMUSCULAR; INTRAVENOUS at 16:30

## 2021-01-15 NOTE — OB PROVIDER DELIVERY SUMMARY - NSPROVIDERDELIVERYNOTE_OBGYN_ALL_OB_FT
rLTCS x3 at 38wks in setting of GDMA2. Delivery of female infant from vertex presentation through clear fluid, Apgars 9/9, weight 3660g. Dense adhesions between anterior abdominal wall, muscle, bladder and uterus. Omental adhesions to anterior abdominal wall. Fascia closed with looped PDS. SubQ closed in layer in interrupted fashion.     EBL 377cc  IVF 3000cc  UOP 350cc

## 2021-01-15 NOTE — OB PROVIDER H&P - ASSESSMENT
30y , EGA@38 weeks, presented for scheduled repeat  section due to poorly controlled  GDM.  Patient reports  +fetal movements denies contractions, leaking of fluid, denies vaginal bleeding.   Patient reports fasting blood glucose ranging from 120-160.  Prenatal care at [facility] clinic.  Prenatal course is significant for GDM, A2; normal fetal echocardiogram.   GBS negative status.    OB hx: 1 medical TOP at 5 weeks,            2007, 35 wks  section for pre-eclampsia, [id number]             3/21/2014, 39 weeks, scheduled repeat  section [id number]; GDMA1  Med hx: , atrial septal defect repair, ASD mash implant;  followed by Dr Pride  Surg hx: , hernia repair;                , ASD repair;                , abdominoplasty;                , breast reduction  denies hx clotting or bleeding disorders HTN, DM  GYN hx: denies hx STIs, fibroids, polyps, cysts  Family hx: both parents with HTN; cardiac defects; patient's child with VSD, spontaneously closed;   no hx congential disorders, bleeding/clotting disorders  Psych hx: denies   Social hx: denies ETOH, smoking, drugs. Safe at home/in relationship  Meds: PNV, ASA 81 mg qd; Humulin 26 U qhs  latex (Swelling)  morphine (Pruritus)  Reglan (Other)  Robitussin (Vomiting; Nausea; Rash)  Triaminic (Vomiting; Nausea; Rash)    – Will accept blood transfusions? Yes    T(C): 36.8 (01-15-21 @ 10:44), Max: 36.8 (01-15-21 @ 10:44)  HR: 75 (01-15-21 @ 10:45) (75 - 75)  BP: 122/75 (01-15-21 @ 10:45) (122/75 - 122/75)    – PE:   CV: RRR  Pulm: breathing comfortably on RA  Abd: gravid, nontender  Extr: moving all extremities with ease  FS:   FHT: baseline 120, mod variability, +accels, -decels  [place]: occasional mild  EFW: 3400 g      A: 30y, , EGA@38 weeks, poorly controlled GDM, A2  P: admitted for repeat  section;  follow cardiology f/u.       Moira Basilio CNM         30y , EGA@38 weeks, presented for scheduled repeat  section due to poorly controlled  GDM.  Patient reports  +fetal movements denies contractions, leaking of fluid, denies vaginal bleeding.   Patient reports fasting blood glucose ranging from 120-160.  Prenatal care at [facility] OB clinic.  Prenatal course is significant for GDM, A2; normal fetal echocardiogram.   GBS positive status.    OB hx: 1 medical TOP at 5 weeks,            2007, 35 wks  section for pre-eclampsia, [id number]             3/21/2014, 39 weeks, scheduled repeat  section [id number]; GDMA1  Med hx: , atrial septal defect repair, ASD mash implant;  followed by Dr Pride  Surg hx: , hernia repair;                , ASD repair;                , abdominoplasty;                , breast reduction  denies hx clotting or bleeding disorders HTN, DM  GYN hx: denies hx STIs, fibroids, polyps, cysts  Family hx: both parents with HTN; cardiac defects; patient's child with VSD, spontaneously closed;   no hx congential disorders, bleeding/clotting disorders  Psych hx: denies   Social hx: denies ETOH, smoking, drugs. Safe at home/in relationship  Meds: PNV, ASA 81 mg qd; Humulin 26 U qhs  latex (Swelling)  morphine (Pruritus)  Reglan (Other)  Robitussin (Vomiting; Nausea; Rash)  Triaminic (Vomiting; Nausea; Rash)    – Will accept blood transfusions? Yes    T(C): 36.8 (01-15-21 @ 10:44), Max: 36.8 (01-15-21 @ 10:44)  HR: 75 (01-15-21 @ 10:45) (75 - 75)  BP: 122/75 (01-15-21 @ 10:45) (122/75 - 122/75)    – PE:   CV: RRR  Pulm: breathing comfortably on RA  Abd: gravid, nontender  Extr: moving all extremities with ease  FS: 80  FHT: baseline 120, mod variability, +accels, -decels  [place]: occasional mild  EFW: 3400 g      A: 30y, , EGA@38 weeks, poorly controlled GDM, A2  P: admitted for repeat  section;  follow cardiology f/u.       Moira Basilio CNM

## 2021-01-15 NOTE — OB PROVIDER H&P - HISTORY OF PRESENT ILLNESS
Pt is a 30 yr old female scheduled for Repeat  with Dr Bustillos tentatively 1/15/21 - pt hx   with pre eclampsia at 35 weeks and  - Pt hx GDM - on Humulin N nightly and ASA 81 mg po which pt has not taken in several weeks. Pt seen by OB Monday and fetal heartbeat and activity monitored. Pt c/o of sinus congestion for several days and to call OB/PCP for evaluation. Hx of ASD Repair  and has been followed by Dr Cortez at [facility] Cardio clinic - Pt denies vaginal bleeding or contractions - Pt states FBG in 80s to 100s - recent increase in Humulin N 24 u SC HS last night    Pt denies COVID or recent travel   Pt had COVID pre op test

## 2021-01-15 NOTE — OB RN PATIENT PROFILE - ALERT: PERTINENT HISTORY
1st Trimester Sonogram/20 Week Level II Sonogram/Follow up Sonogram for Growth/Non Invasive Prenatal Screen (NIPS)/Fetal Non-Stress Test (NST)/Ultra Screen at 12 Weeks/Other

## 2021-01-16 ENCOUNTER — TRANSCRIPTION ENCOUNTER (OUTPATIENT)
Age: 31
End: 2021-01-16

## 2021-01-16 LAB — T PALLIDUM AB TITR SER: NEGATIVE — SIGNIFICANT CHANGE UP

## 2021-01-16 RX ORDER — SENNA PLUS 8.6 MG/1
1 TABLET ORAL
Refills: 0 | Status: DISCONTINUED | OUTPATIENT
Start: 2021-01-16 | End: 2021-01-18

## 2021-01-16 RX ORDER — IBUPROFEN 200 MG
600 TABLET ORAL EVERY 6 HOURS
Refills: 0 | Status: DISCONTINUED | OUTPATIENT
Start: 2021-01-16 | End: 2021-01-18

## 2021-01-16 RX ORDER — FERROUS SULFATE 325(65) MG
325 TABLET ORAL DAILY
Refills: 0 | Status: DISCONTINUED | OUTPATIENT
Start: 2021-01-16 | End: 2021-01-18

## 2021-01-16 RX ADMIN — OXYCODONE HYDROCHLORIDE 10 MILLIGRAM(S): 5 TABLET ORAL at 20:19

## 2021-01-16 RX ADMIN — Medication 975 MILLIGRAM(S): at 01:55

## 2021-01-16 RX ADMIN — Medication 1 TABLET(S): at 11:45

## 2021-01-16 RX ADMIN — Medication 975 MILLIGRAM(S): at 14:31

## 2021-01-16 RX ADMIN — OXYCODONE HYDROCHLORIDE 10 MILLIGRAM(S): 5 TABLET ORAL at 14:10

## 2021-01-16 RX ADMIN — Medication 325 MILLIGRAM(S): at 11:44

## 2021-01-16 RX ADMIN — MAGNESIUM HYDROXIDE 30 MILLILITER(S): 400 TABLET, CHEWABLE ORAL at 20:19

## 2021-01-16 RX ADMIN — Medication 30 MILLIGRAM(S): at 11:45

## 2021-01-16 RX ADMIN — Medication 975 MILLIGRAM(S): at 20:19

## 2021-01-16 RX ADMIN — OXYCODONE HYDROCHLORIDE 5 MILLIGRAM(S): 5 TABLET ORAL at 08:06

## 2021-01-16 RX ADMIN — Medication 975 MILLIGRAM(S): at 09:42

## 2021-01-16 RX ADMIN — SIMETHICONE 80 MILLIGRAM(S): 80 TABLET, CHEWABLE ORAL at 09:43

## 2021-01-16 RX ADMIN — Medication 30 MILLIGRAM(S): at 05:25

## 2021-01-16 RX ADMIN — HEPARIN SODIUM 5000 UNIT(S): 5000 INJECTION INTRAVENOUS; SUBCUTANEOUS at 09:43

## 2021-01-16 RX ADMIN — Medication 600 MILLIGRAM(S): at 18:03

## 2021-01-16 NOTE — DISCHARGE NOTE OB - CARE PROVIDER_API CALL
ACU,   Please follow-up for postpartum appointment in 2 weeks at Ambulatory Clinic Unit, [facility], Oncology Building, [facility]. Please call the office for an appointment phone # [phone]  Phone: [phone]  Fax: (   )    -  Follow Up Time:

## 2021-01-16 NOTE — DISCHARGE NOTE OB - HOSPITAL COURSE
Pt presented to the hospital for a repeat low-transverse csection. Pt underwent uncomplicated C/S w/ delivery of healthy infant girl. Pt was discharged on postop day 2 in stable condition. Pt presented to the hospital for a repeat low-transverse csection. Pt underwent uncomplicated C/S w/ delivery of healthy infant girl. Pt was discharged on postop day 3 in stable condition.

## 2021-01-16 NOTE — DISCHARGE NOTE OB - CARE PLAN
Principal Discharge DX:	 delivery delivered  Goal:	return to baseline  Assessment and plan of treatment:	Make your follow-up appointment with your doctor as ordered. Call the clinic to schedule your 2 week follow up appointment for your incision check and a 6 week postpartum visit. No heavy lifting, driving, or strenuous activity for 6 weeks. Nothing per vagina such as tampons, intercourse, douches or tub baths for 6 weeks or until you see your doctor. Call your doctor with any signs and symptoms of infection such as fever, chills, nausea or vomiting. Call your doctor with redness or swelling at the incision site, fluid leakage or wound separation. Call your doctor if you’re unable to tolerate food, increase in vaginal bleeding, or have difficulty urinating. Call your doctor if you have pain that is not relieved by your prescribed medications. Notify your doctor with any other concerns. Call [phone] if you have any of these concerns in the next 6 weeks.

## 2021-01-16 NOTE — DISCHARGE NOTE OB - PLAN OF CARE
return to baseline Make your follow-up appointment with your doctor as ordered. Call the clinic to schedule your 2 week follow up appointment for your incision check and a 6 week postpartum visit. No heavy lifting, driving, or strenuous activity for 6 weeks. Nothing per vagina such as tampons, intercourse, douches or tub baths for 6 weeks or until you see your doctor. Call your doctor with any signs and symptoms of infection such as fever, chills, nausea or vomiting. Call your doctor with redness or swelling at the incision site, fluid leakage or wound separation. Call your doctor if you’re unable to tolerate food, increase in vaginal bleeding, or have difficulty urinating. Call your doctor if you have pain that is not relieved by your prescribed medications. Notify your doctor with any other concerns. Call [phone] if you have any of these concerns in the next 6 weeks.

## 2021-01-16 NOTE — DISCHARGE NOTE OB - MEDICATION SUMMARY - MEDICATIONS TO TAKE
I will START or STAY ON the medications listed below when I get home from the hospital:    Oxaydo 5 mg oral tablet  -- 1 tab(s) by mouth every 6 hours for SEVERE pain MDD:4 tablets  -- Caution federal law prohibits the transfer of this drug to any person other  than the person for whom it was prescribed.  It is very important that you take or use this exactly as directed.  Do not skip doses or discontinue unless directed by your doctor.  May cause drowsiness or dizziness.  This prescription cannot be refilled.  Using more of this medication than prescribed may cause serious breathing problems.    -- Indication: For SEVERE pain    acetaminophen 325 mg oral tablet  -- 3 tab(s) by mouth   -- Indication: For pain    ibuprofen 600 mg oral tablet  -- 1 tab(s) by mouth every 6 hours  -- Indication: For pain

## 2021-01-16 NOTE — DISCHARGE NOTE OB - PROVIDER TOKENS
FREE:[LAST:[ACU],PHONE:[phone]],FAX:[(   )    -],ADDRESS:[Please follow-up for postpartum appointment in 2 weeks at Ambulatory Clinic Unit, [facility], Oncology Building, [facility]. Please call the office for an appointment phone # [phone]]]

## 2021-01-16 NOTE — PROGRESS NOTE ADULT - ATTENDING COMMENTS
Pt seen and examined, agree with above resident note.  Pt reports she is doing well.  Denies HA, CP, SOB, calf pain, fevers or chills.  Pt denies HA, visual changes, RUQ/epigastric pain, N/V.  Pt reports pain controlled with PO pain meds.  Tolerating reg diet, +flatus/-BM.  Voiding and ambulating without difficulty.     VSS  Hct 30.6-->27.3 pt asymptomatic     General: NAD ambulating in room   Abd: ATTP, tympanic to percussion, fundus firm   Incision: C/D/I with dermabond  Ext: no calf tenderness b/l    POD #1 sp RLTCS, hx of gdma2 this pregnancy, hx preeclampsia with prior pregnancy.      1.  GDMA2- repeat testing 6 weeks PP  2.  Hx preeclampsia - no issues with HTN this pregnancy, BP's within normal limits, will continue to monitor BP's pp  3.  Anemia 2/2 acute blood loss from surgery, pt asymptomatic, recommend Iron supplement   4.  Continue routine postpartum and postop care    Liana Toledo MD

## 2021-01-16 NOTE — DISCHARGE NOTE OB - PATIENT PORTAL LINK FT
You can access the FollowMyHealth Patient Portal offered by [facility] by registering at the following website: [email or web address]. By joining [facility]’s FollowMyHealth portal, you will also be able to view your health information using other applications (apps) compatible with our system.

## 2021-01-17 RX ORDER — OXYCODONE HYDROCHLORIDE 5 MG/1
5 TABLET ORAL ONCE
Refills: 0 | Status: DISCONTINUED | OUTPATIENT
Start: 2021-01-17 | End: 2021-01-17

## 2021-01-17 RX ORDER — OXYCODONE HYDROCHLORIDE 5 MG/1
5 TABLET ORAL
Refills: 0 | Status: DISCONTINUED | OUTPATIENT
Start: 2021-01-17 | End: 2021-01-18

## 2021-01-17 RX ADMIN — SIMETHICONE 80 MILLIGRAM(S): 80 TABLET, CHEWABLE ORAL at 05:50

## 2021-01-17 RX ADMIN — Medication 975 MILLIGRAM(S): at 03:15

## 2021-01-17 RX ADMIN — Medication 600 MILLIGRAM(S): at 18:00

## 2021-01-17 RX ADMIN — OXYCODONE HYDROCHLORIDE 5 MILLIGRAM(S): 5 TABLET ORAL at 17:59

## 2021-01-17 RX ADMIN — Medication 1 TABLET(S): at 12:49

## 2021-01-17 RX ADMIN — Medication 600 MILLIGRAM(S): at 12:20

## 2021-01-17 RX ADMIN — OXYCODONE HYDROCHLORIDE 5 MILLIGRAM(S): 5 TABLET ORAL at 09:28

## 2021-01-17 RX ADMIN — OXYCODONE HYDROCHLORIDE 5 MILLIGRAM(S): 5 TABLET ORAL at 02:30

## 2021-01-17 RX ADMIN — Medication 600 MILLIGRAM(S): at 01:25

## 2021-01-17 RX ADMIN — OXYCODONE HYDROCHLORIDE 5 MILLIGRAM(S): 5 TABLET ORAL at 21:35

## 2021-01-17 RX ADMIN — Medication 975 MILLIGRAM(S): at 21:35

## 2021-01-17 RX ADMIN — OXYCODONE HYDROCHLORIDE 5 MILLIGRAM(S): 5 TABLET ORAL at 12:30

## 2021-01-17 RX ADMIN — Medication 975 MILLIGRAM(S): at 09:27

## 2021-01-17 RX ADMIN — HEPARIN SODIUM 5000 UNIT(S): 5000 INJECTION INTRAVENOUS; SUBCUTANEOUS at 01:25

## 2021-01-17 RX ADMIN — OXYCODONE HYDROCHLORIDE 5 MILLIGRAM(S): 5 TABLET ORAL at 05:50

## 2021-01-17 RX ADMIN — Medication 325 MILLIGRAM(S): at 12:49

## 2021-01-17 RX ADMIN — OXYCODONE HYDROCHLORIDE 5 MILLIGRAM(S): 5 TABLET ORAL at 03:12

## 2021-01-17 RX ADMIN — Medication 975 MILLIGRAM(S): at 15:00

## 2021-01-17 RX ADMIN — Medication 600 MILLIGRAM(S): at 06:55

## 2021-01-17 RX ADMIN — HEPARIN SODIUM 5000 UNIT(S): 5000 INJECTION INTRAVENOUS; SUBCUTANEOUS at 12:15

## 2021-01-18 VITALS
RESPIRATION RATE: 19 BRPM | HEART RATE: 80 BPM | DIASTOLIC BLOOD PRESSURE: 84 MMHG | OXYGEN SATURATION: 100 % | SYSTOLIC BLOOD PRESSURE: 122 MMHG | TEMPERATURE: 98 F

## 2021-01-18 RX ORDER — OXYCODONE HYDROCHLORIDE 5 MG/1
5 TABLET ORAL ONCE
Refills: 0 | Status: DISCONTINUED | OUTPATIENT
Start: 2021-01-18 | End: 2021-01-18

## 2021-01-18 RX ORDER — IBUPROFEN 200 MG
1 TABLET ORAL
Qty: 0 | Refills: 0 | DISCHARGE
Start: 2021-01-18

## 2021-01-18 RX ORDER — OXYCODONE HYDROCHLORIDE 5 MG/1
1 TABLET ORAL
Qty: 5 | Refills: 0
Start: 2021-01-18

## 2021-01-18 RX ORDER — ACETAMINOPHEN 500 MG
3 TABLET ORAL
Qty: 0 | Refills: 0 | DISCHARGE
Start: 2021-01-18

## 2021-01-18 RX ADMIN — HEPARIN SODIUM 5000 UNIT(S): 5000 INJECTION INTRAVENOUS; SUBCUTANEOUS at 00:42

## 2021-01-18 RX ADMIN — Medication 975 MILLIGRAM(S): at 03:43

## 2021-01-18 RX ADMIN — Medication 975 MILLIGRAM(S): at 10:39

## 2021-01-18 RX ADMIN — Medication 600 MILLIGRAM(S): at 07:22

## 2021-01-18 RX ADMIN — OXYCODONE HYDROCHLORIDE 5 MILLIGRAM(S): 5 TABLET ORAL at 00:42

## 2021-01-18 RX ADMIN — Medication 600 MILLIGRAM(S): at 00:42

## 2021-01-18 RX ADMIN — OXYCODONE HYDROCHLORIDE 5 MILLIGRAM(S): 5 TABLET ORAL at 10:39

## 2021-01-18 RX ADMIN — OXYCODONE HYDROCHLORIDE 5 MILLIGRAM(S): 5 TABLET ORAL at 07:22

## 2021-01-18 RX ADMIN — OXYCODONE HYDROCHLORIDE 5 MILLIGRAM(S): 5 TABLET ORAL at 03:43

## 2021-01-18 NOTE — PROGRESS NOTE ADULT - ATTENDING COMMENTS
Pt seen and examined agree with above resident note.  Pt reports better pain control with PO meds.  Denies HA, CP, SOB, calf pain, fevers/ chills.  tolerating reg diet -N/-V.  +flatus/ +BM.  Voiding and ambulating without difficulty.  Pt reports decreasing lochia.        VSS    General: NAD   Abd: ATTP, fundus firm  Incision: C/D/I dermabond  Ext: no calf tenderness b/l      POD#3 sp scheduled repeat c/s.  Course complicated by GDMA2, doing well.   Dc home today  GDMA2 repeat testing in 6 weeks    Liana Toledo MD

## 2021-01-18 NOTE — PROGRESS NOTE ADULT - SUBJECTIVE AND OBJECTIVE BOX
Postop Day  __1_ s/p   C- Section    THERAPY:  [ x ] Spinal morphine   [  ] Epidural morphine   [  ] IV PCA Hydromorphone 1 mg/ml      Sedation Score:	  [ x ] Alert	    [  ] Drowsy        [  ] Arousable	[  ] Asleep	[  ] Unresponsive    Side Effects:	  [x  ] None	     [  ] Nausea        [  ] Pruritus        [  ] Weakness   [  ] Numbness        ASSESSMENT/ PLAN   [ x  ] Discontinue         [  ] Continue  [ x ]Documentation and Verification of current medications     Comments:
Patient seen and examined at bedside, no acute overnight events. Pt states pain is worse today does not feel ready for discharge today. Patient is ambulating, voiding spontaneously, passing flatus, and tolerating regular diet. Denies CP, SOB, N/V, HA, blurred vision, epigastric pain.    Vital Signs Last 24 Hours  T(C): 36.4 (01-17-21 @ 05:55), Max: 36.8 (01-16-21 @ 13:24)  HR: 69 (01-17-21 @ 05:55) (69 - 85)  BP: 124/82 (01-17-21 @ 05:55) (106/73 - 124/82)  RR: 18 (01-17-21 @ 05:55) (18 - 18)  SpO2: 100% (01-17-21 @ 05:55) (100% - 100%)    Physical Exam:  General: NAD  Abdomen: Soft, expected tenderness, non-distended, fundus firm  Incision: Pfannenstiel incision CDI, dermabond in place  Pelvic: Lochia wnl    Labs:    Blood Type: O Positive  Antibody Screen: Negative  RPR: Negative               9.3    9.35  )-----------( 289      ( 01-15 @ 11:06 )             30.6                9.6    9.18  )-----------( 317      ( 01-12 @ 20:43 )             32.4                9.8    8.84  )-----------( 311      ( 01-11 @ 16:25 )             31.9         MEDICATIONS  (STANDING):  acetaminophen   Tablet .. 975 milliGRAM(s) Oral <User Schedule>  diphtheria/tetanus/pertussis (acellular) Vaccine (ADAcel) 0.5 milliLiter(s) IntraMuscular once  ferrous    sulfate 325 milliGRAM(s) Oral daily  heparin   Injectable 5000 Unit(s) SubCutaneous every 12 hours  ibuprofen  Tablet. 600 milliGRAM(s) Oral every 6 hours  influenza   Vaccine 0.5 milliLiter(s) IntraMuscular once  lactated ringers. 1000 milliLiter(s) (125 mL/Hr) IV Continuous <Continuous>  oxytocin Infusion 333.333 milliUNIT(s)/Min (1000 mL/Hr) IV Continuous <Continuous>  prenatal multivitamin 1 Tablet(s) Oral daily    MEDICATIONS  (PRN):  dexAMETHasone  Injectable 4 milliGRAM(s) IV Push once PRN Nausea  lanolin Ointment 1 Application(s) Topical every 6 hours PRN Sore Nipples  magnesium hydroxide Suspension 30 milliLiter(s) Oral two times a day PRN Constipation  nalbuphine Injectable 1.25 milliGRAM(s) IV Push once PRN itching  naloxone Injectable 0.1 milliGRAM(s) IV Push every 3 minutes PRN For ANY of the following changes in patient status:  A. Breaths Per Minute LESS THAN 10, B. Oxygen saturation LESS THAN 90%, C. Sedation score of 6 for Stop After: 4 Times  ondansetron Injectable 4 milliGRAM(s) IV Push every 6 hours PRN Nausea  oxyCODONE    IR 5 milliGRAM(s) Oral once PRN Moderate to Severe Pain (4-10)  oxyCODONE    IR 5 milliGRAM(s) Oral every 3 hours PRN Moderate to Severe Pain (4-10)  senna 1 Tablet(s) Oral two times a day PRN Constipation  simethicone 80 milliGRAM(s) Chew every 4 hours PRN Gas      
Patient seen and examined at bedside, no acute overnight events. No acute complaints, pain well controlled. Patient is ambulating, voiding spontaneously, passing flatus, and tolerating regular diet. Denies CP, SOB, N/V, HA, blurred vision, epigastric pain.    Vital Signs Last 24 Hours  T(C): 36.8 (01-17-21 @ 21:57), Max: 36.8 (01-17-21 @ 14:56)  HR: 80 (01-17-21 @ 21:57) (80 - 81)  BP: 131/90 (01-17-21 @ 21:57) (121/78 - 131/90)  RR: 18 (01-17-21 @ 21:57) (17 - 18)  SpO2: 100% (01-17-21 @ 21:57) (100% - 100%)    Physical Exam:  General: NAD  Abdomen: Soft, expected tenderness, non-distended, fundus firm  Incision: Pfannenstiel incision CDI, dermabond in place  Pelvic: Lochia wnl    Labs:    Blood Type: O Positive  Antibody Screen: Negative  RPR: Negative               9.3    9.35  )-----------( 289      ( 01-15 @ 11:06 )             30.6                9.6    9.18  )-----------( 317      ( 01-12 @ 20:43 )             32.4                9.8    8.84  )-----------( 311      ( 01-11 @ 16:25 )             31.9         MEDICATIONS  (STANDING):  acetaminophen   Tablet .. 975 milliGRAM(s) Oral <User Schedule>  diphtheria/tetanus/pertussis (acellular) Vaccine (ADAcel) 0.5 milliLiter(s) IntraMuscular once  ferrous    sulfate 325 milliGRAM(s) Oral daily  heparin   Injectable 5000 Unit(s) SubCutaneous every 12 hours  ibuprofen  Tablet. 600 milliGRAM(s) Oral every 6 hours  influenza   Vaccine 0.5 milliLiter(s) IntraMuscular once  lactated ringers. 1000 milliLiter(s) (125 mL/Hr) IV Continuous <Continuous>  oxytocin Infusion 333.333 milliUNIT(s)/Min (1000 mL/Hr) IV Continuous <Continuous>  prenatal multivitamin 1 Tablet(s) Oral daily    MEDICATIONS  (PRN):  dexAMETHasone  Injectable 4 milliGRAM(s) IV Push once PRN Nausea  lanolin Ointment 1 Application(s) Topical every 6 hours PRN Sore Nipples  magnesium hydroxide Suspension 30 milliLiter(s) Oral two times a day PRN Constipation  nalbuphine Injectable 1.25 milliGRAM(s) IV Push once PRN itching  naloxone Injectable 0.1 milliGRAM(s) IV Push every 3 minutes PRN For ANY of the following changes in patient status:  A. Breaths Per Minute LESS THAN 10, B. Oxygen saturation LESS THAN 90%, C. Sedation score of 6 for Stop After: 4 Times  ondansetron Injectable 4 milliGRAM(s) IV Push every 6 hours PRN Nausea  oxyCODONE    IR 5 milliGRAM(s) Oral once PRN Moderate to Severe Pain (4-10)  oxyCODONE    IR 5 milliGRAM(s) Oral every 3 hours PRN Moderate to Severe Pain (4-10)  senna 1 Tablet(s) Oral two times a day PRN Constipation  simethicone 80 milliGRAM(s) Chew every 4 hours PRN Gas      
Patient seen and examined at bedside, no acute overnight events. Pt c/o pain not covered by tylenol/motrin. Patient is ambulating and tolerating regular diet. Has passed flatus. Denies CP, SOB, N/V, HA, blurred vision, epigastric pain.    Vital Signs Last 24 Hours  T(C): 36.7 (01-16-21 @ 05:28), Max: 37.1 (01-15-21 @ 15:54)  HR: 73 (01-16-21 @ 05:28) (66 - 98)  BP: 118/70 (01-16-21 @ 05:28) (98/50 - 136/78)  RR: 18 (01-16-21 @ 05:28) (12 - 23)  SpO2: 100% (01-16-21 @ 05:28) (95% - 100%)    I&O's Summary    15 Dionte 2021 07:01  -  16 Jan 2021 07:00  --------------------------------------------------------  IN: 5700 mL / OUT: 2357 mL / NET: 3343 mL        Physical Exam:  General: NAD  Abdomen: Soft, expected tenderness, non-distended, fundus firm  Incision: Pfannenstiel incision CDI, dermabond in place  Pelvic: Lochia wnl    Labs:    Blood Type: O Positive  Antibody Screen: Negative  RPR: Negative               9.3    9.35  )-----------( 289      ( 01-15 @ 11:06 )             30.6                9.6    9.18  )-----------( 317      ( 01-12 @ 20:43 )             32.4                9.8    8.84  )-----------( 311      ( 01-11 @ 16:25 )             31.9         MEDICATIONS  (STANDING):  acetaminophen   Tablet .. 975 milliGRAM(s) Oral <User Schedule>  diphtheria/tetanus/pertussis (acellular) Vaccine (ADAcel) 0.5 milliLiter(s) IntraMuscular once  ferrous    sulfate 325 milliGRAM(s) Oral daily  heparin   Injectable 5000 Unit(s) SubCutaneous every 12 hours  ibuprofen  Tablet. 600 milliGRAM(s) Oral every 6 hours  influenza   Vaccine 0.5 milliLiter(s) IntraMuscular once  ketorolac   Injectable 30 milliGRAM(s) IV Push every 6 hours  lactated ringers. 1000 milliLiter(s) (125 mL/Hr) IV Continuous <Continuous>  oxytocin Infusion 333.333 milliUNIT(s)/Min (1000 mL/Hr) IV Continuous <Continuous>  prenatal multivitamin 1 Tablet(s) Oral daily    MEDICATIONS  (PRN):  butorphanol Injectable 0.125 milliGRAM(s) IV Push every 6 hours PRN Pruritus  dexAMETHasone  Injectable 4 milliGRAM(s) IV Push once PRN Nausea  lanolin Ointment 1 Application(s) Topical every 6 hours PRN Sore Nipples  magnesium hydroxide Suspension 30 milliLiter(s) Oral two times a day PRN Constipation  nalbuphine Injectable 1.25 milliGRAM(s) IV Push once PRN itching  naloxone Injectable 0.1 milliGRAM(s) IV Push every 3 minutes PRN For ANY of the following changes in patient status:  A. Breaths Per Minute LESS THAN 10, B. Oxygen saturation LESS THAN 90%, C. Sedation score of 6 for Stop After: 4 Times  ondansetron Injectable 4 milliGRAM(s) IV Push every 6 hours PRN Nausea  oxyCODONE    IR 5 milliGRAM(s) Oral every 3 hours PRN Mild Pain (1 - 3)  oxyCODONE    IR 10 milliGRAM(s) Oral every 3 hours PRN Moderate Pain (4 - 6)  oxyCODONE    IR 5 milliGRAM(s) Oral every 3 hours PRN Moderate to Severe Pain (4-10)  oxyCODONE    IR 5 milliGRAM(s) Oral once PRN Moderate to Severe Pain (4-10)  senna 1 Tablet(s) Oral two times a day PRN Constipation  simethicone 80 milliGRAM(s) Chew every 4 hours PRN Gas

## 2021-01-18 NOTE — PROGRESS NOTE ADULT - ASSESSMENT
Pt is a 31yo  POD2 from rLTCS in stable condition postpartum. H/o GDMA2.
Pt is a 31yo  POD1 from rLTCS in stable condition postpartum. H/o GDMA2, most recent finger stick 125.
Pt is a 31yo  POD3 from rLTCS in stable condition postpartum. H/o GDMA2.

## 2021-01-18 NOTE — PROGRESS NOTE ADULT - PROBLEM SELECTOR PLAN 1
- Continue with po analgesia  - Increase ambulation  - Continue regular diet  - IV lock  - No labs  - pt to stay one more day and be d/c tomorrow if stable    Hailey Smyth,PGY1
- review am CBC when resulted  - pt is considering Mirena IUD at postpartum visit, will use condoms as bridge  - continue with PO analgesia  - monitor FS for 24hrs postpartum  - increase ambulation  - continue regular diet  - encourage incentive spirometry  - d/c IV fluids    Hailey Smyth, PGY1
- Continue with po analgesia  - Increase ambulation  - Continue regular diet  - IV lock  - No labs  - desires mirena IUD at pp visit, condoms for bridge    Hailey Smyth,PGY1

## 2021-01-19 ENCOUNTER — NON-APPOINTMENT (OUTPATIENT)
Age: 31
End: 2021-01-19

## 2021-01-19 RX ORDER — IBUPROFEN 200 MG/1
200 TABLET ORAL
Refills: 0 | Status: ACTIVE | COMMUNITY
Start: 2021-01-19

## 2021-01-19 RX ORDER — PYRIDOXINE HCL (VITAMIN B6) 25 MG
25 TABLET ORAL
Qty: 90 | Refills: 0 | Status: DISCONTINUED | COMMUNITY
Start: 2020-08-06 | End: 2021-01-19

## 2021-01-19 RX ORDER — OXYCODONE 5 MG/1
5 TABLET ORAL EVERY 6 HOURS
Refills: 0 | Status: ACTIVE | COMMUNITY
Start: 2021-01-19

## 2021-01-19 RX ORDER — NYSTATIN 100000 [USP'U]/G
100000 CREAM TOPICAL TWICE DAILY
Qty: 1 | Refills: 0 | Status: DISCONTINUED | COMMUNITY
Start: 2020-11-03 | End: 2021-01-19

## 2021-01-19 RX ORDER — DOXYLAMINE SUCCINATE 25 MG
25 TABLET ORAL
Qty: 30 | Refills: 1 | Status: DISCONTINUED | COMMUNITY
Start: 2020-08-06 | End: 2021-01-19

## 2021-01-19 RX ORDER — ASPIRIN ENTERIC COATED TABLETS 81 MG 81 MG/1
81 TABLET, DELAYED RELEASE ORAL DAILY
Qty: 30 | Refills: 6 | Status: DISCONTINUED | COMMUNITY
Start: 2020-07-23 | End: 2021-01-19

## 2021-01-19 RX ORDER — POLYETHYLENE GLYCOL 3350 17 G/17G
17 POWDER, FOR SOLUTION ORAL DAILY
Qty: 1 | Refills: 0 | Status: DISCONTINUED | COMMUNITY
Start: 2020-11-04 | End: 2021-01-19

## 2021-01-19 RX ORDER — TRIAMCINOLONE ACETONIDE 1 MG/G
0.1 CREAM TOPICAL TWICE DAILY
Qty: 30 | Refills: 1 | Status: DISCONTINUED | COMMUNITY
Start: 2020-11-03 | End: 2021-01-19

## 2021-01-19 RX ORDER — LANCETS
EACH MISCELLANEOUS
Qty: 2 | Refills: 2 | Status: DISCONTINUED | COMMUNITY
Start: 2020-11-17 | End: 2021-01-19

## 2021-01-19 RX ORDER — INSULIN HUMAN 100 [IU]/ML
100 INJECTION, SUSPENSION SUBCUTANEOUS
Qty: 1 | Refills: 2 | Status: DISCONTINUED | COMMUNITY
Start: 2020-11-23 | End: 2021-01-19

## 2021-01-19 RX ORDER — PEN NEEDLE, DIABETIC 29 G X1/2"
32G X 4 MM NEEDLE, DISPOSABLE MISCELLANEOUS
Qty: 1 | Refills: 2 | Status: DISCONTINUED | COMMUNITY
Start: 2020-11-23 | End: 2021-01-19

## 2021-01-19 RX ORDER — DOCUSATE SODIUM 100 MG/1
100 CAPSULE ORAL TWICE DAILY
Qty: 60 | Refills: 3 | Status: DISCONTINUED | COMMUNITY
Start: 2020-07-23 | End: 2021-01-19

## 2021-01-19 RX ORDER — BLOOD-GLUCOSE METER
W/DEVICE KIT MISCELLANEOUS
Qty: 1 | Refills: 0 | Status: DISCONTINUED | COMMUNITY
Start: 2020-11-17 | End: 2021-01-19

## 2021-01-19 RX ORDER — METRONIDAZOLE 7.5 MG/G
0.75 GEL VAGINAL
Qty: 1 | Refills: 0 | Status: DISCONTINUED | COMMUNITY
Start: 2020-09-02 | End: 2021-01-19

## 2021-01-19 RX ORDER — ACETAMINOPHEN 325 MG/1
325 TABLET ORAL
Refills: 0 | Status: ACTIVE | COMMUNITY
Start: 2021-01-19

## 2021-01-19 RX ORDER — BLOOD SUGAR DIAGNOSTIC
STRIP MISCELLANEOUS 4 TIMES DAILY
Qty: 2 | Refills: 2 | Status: DISCONTINUED | COMMUNITY
Start: 2020-11-17 | End: 2021-01-19

## 2021-01-20 LAB
BASOPHILS # BLD AUTO: 0.05 K/UL — SIGNIFICANT CHANGE UP (ref 0–0.2)
BASOPHILS NFR BLD AUTO: 0.6 % — SIGNIFICANT CHANGE UP (ref 0–2)
EOSINOPHIL # BLD AUTO: 0.23 K/UL — SIGNIFICANT CHANGE UP (ref 0–0.5)
EOSINOPHIL NFR BLD AUTO: 2.6 % — SIGNIFICANT CHANGE UP (ref 0–6)
HCT VFR BLD CALC: 27.8 % — LOW (ref 34.5–45)
HGB BLD-MCNC: 8.2 G/DL — LOW (ref 11.5–15.5)
IANC: 6.15 K/UL — SIGNIFICANT CHANGE UP (ref 1.5–8.5)
IMM GRANULOCYTES NFR BLD AUTO: 0.3 % — SIGNIFICANT CHANGE UP (ref 0–1.5)
LYMPHOCYTES # BLD AUTO: 1.98 K/UL — SIGNIFICANT CHANGE UP (ref 1–3.3)
LYMPHOCYTES # BLD AUTO: 22.1 % — SIGNIFICANT CHANGE UP (ref 13–44)
MCHC RBC-ENTMCNC: 23.4 PG — LOW (ref 27–34)
MCHC RBC-ENTMCNC: 29.5 GM/DL — LOW (ref 32–36)
MCV RBC AUTO: 79.4 FL — LOW (ref 80–100)
MONOCYTES # BLD AUTO: 0.53 K/UL — SIGNIFICANT CHANGE UP (ref 0–0.9)
MONOCYTES NFR BLD AUTO: 5.9 % — SIGNIFICANT CHANGE UP (ref 2–14)
NEUTROPHILS # BLD AUTO: 6.15 K/UL — SIGNIFICANT CHANGE UP (ref 1.8–7.4)
NEUTROPHILS NFR BLD AUTO: 68.5 % — SIGNIFICANT CHANGE UP (ref 43–77)
NRBC # BLD: 0 /100 WBCS — SIGNIFICANT CHANGE UP
NRBC # FLD: 0 K/UL — SIGNIFICANT CHANGE UP
PLATELET # BLD AUTO: 266 K/UL — SIGNIFICANT CHANGE UP (ref 150–400)
RBC # BLD: 3.5 M/UL — LOW (ref 3.8–5.2)
RBC # FLD: 15.4 % — HIGH (ref 10.3–14.5)
WBC # BLD: 8.97 K/UL — SIGNIFICANT CHANGE UP (ref 3.8–10.5)
WBC # FLD AUTO: 8.97 K/UL — SIGNIFICANT CHANGE UP (ref 3.8–10.5)

## 2021-01-21 ENCOUNTER — NON-APPOINTMENT (OUTPATIENT)
Age: 31
End: 2021-01-21

## 2021-01-21 VITALS
DIASTOLIC BLOOD PRESSURE: 58 MMHG | SYSTOLIC BLOOD PRESSURE: 121 MMHG | HEART RATE: 108 BPM | BODY MASS INDEX: 24.28 KG/M2 | TEMPERATURE: 97.5 F | WEIGHT: 155 LBS

## 2021-01-21 RX ORDER — CIPROFLOXACIN HYDROCHLORIDE 500 MG/1
500 TABLET, FILM COATED ORAL
Refills: 0 | Status: ACTIVE | COMMUNITY
Start: 2021-01-21

## 2021-01-22 ENCOUNTER — INPATIENT (INPATIENT)
Facility: HOSPITAL | Age: 31
LOS: 0 days | Discharge: HOME | End: 2021-01-23
Attending: SPECIALIST | Admitting: SPECIALIST
Payer: MEDICAID

## 2021-01-22 ENCOUNTER — APPOINTMENT (OUTPATIENT)
Dept: OBGYN | Facility: HOSPITAL | Age: 31
End: 2021-01-22

## 2021-01-22 ENCOUNTER — TRANSCRIPTION ENCOUNTER (OUTPATIENT)
Age: 31
End: 2021-01-22

## 2021-01-22 VITALS
RESPIRATION RATE: 20 BRPM | SYSTOLIC BLOOD PRESSURE: 133 MMHG | HEART RATE: 85 BPM | TEMPERATURE: 99 F | DIASTOLIC BLOOD PRESSURE: 79 MMHG

## 2021-01-22 DIAGNOSIS — O14.90 UNSPECIFIED PRE-ECLAMPSIA, UNSPECIFIED TRIMESTER: Chronic | ICD-10-CM

## 2021-01-22 DIAGNOSIS — Z98.890 OTHER SPECIFIED POSTPROCEDURAL STATES: Chronic | ICD-10-CM

## 2021-01-22 DIAGNOSIS — O14.90 UNSPECIFIED PRE-ECLAMPSIA, UNSPECIFIED TRIMESTER: ICD-10-CM

## 2021-01-22 DIAGNOSIS — K46.9 UNSPECIFIED ABDOMINAL HERNIA WITHOUT OBSTRUCTION OR GANGRENE: Chronic | ICD-10-CM

## 2021-01-22 DIAGNOSIS — Z98.89 OTHER SPECIFIED POSTPROCEDURAL STATES: Chronic | ICD-10-CM

## 2021-01-22 DIAGNOSIS — Q21.1 ATRIAL SEPTAL DEFECT: Chronic | ICD-10-CM

## 2021-01-22 LAB
ALBUMIN SERPL ELPH-MCNC: 3 G/DL — LOW (ref 3.3–5)
ALBUMIN SERPL ELPH-MCNC: 3.3 G/DL — SIGNIFICANT CHANGE UP (ref 3.3–5)
ALP SERPL-CCNC: 162 U/L — HIGH (ref 40–120)
ALP SERPL-CCNC: 168 U/L — HIGH (ref 40–120)
ALT FLD-CCNC: 13 U/L — SIGNIFICANT CHANGE UP (ref 4–33)
ALT FLD-CCNC: 14 U/L — SIGNIFICANT CHANGE UP (ref 4–33)
ANION GAP SERPL CALC-SCNC: 11 MMOL/L — SIGNIFICANT CHANGE UP (ref 7–14)
ANION GAP SERPL CALC-SCNC: 11 MMOL/L — SIGNIFICANT CHANGE UP (ref 7–14)
APTT BLD: 29.7 SEC — SIGNIFICANT CHANGE UP (ref 27–36.3)
APTT BLD: 29.9 SEC — SIGNIFICANT CHANGE UP (ref 27–36.3)
AST SERPL-CCNC: 14 U/L — SIGNIFICANT CHANGE UP (ref 4–32)
AST SERPL-CCNC: 18 U/L — SIGNIFICANT CHANGE UP (ref 4–32)
BASOPHILS # BLD AUTO: 0.04 K/UL — SIGNIFICANT CHANGE UP (ref 0–0.2)
BASOPHILS # BLD AUTO: 0.06 K/UL — SIGNIFICANT CHANGE UP (ref 0–0.2)
BASOPHILS NFR BLD AUTO: 0.5 % — SIGNIFICANT CHANGE UP (ref 0–2)
BASOPHILS NFR BLD AUTO: 0.6 % — SIGNIFICANT CHANGE UP (ref 0–2)
BILIRUB SERPL-MCNC: 0.3 MG/DL — SIGNIFICANT CHANGE UP (ref 0.2–1.2)
BILIRUB SERPL-MCNC: 0.3 MG/DL — SIGNIFICANT CHANGE UP (ref 0.2–1.2)
BLD GP AB SCN SERPL QL: NEGATIVE — SIGNIFICANT CHANGE UP
BUN SERPL-MCNC: 10 MG/DL — SIGNIFICANT CHANGE UP (ref 7–23)
BUN SERPL-MCNC: 9 MG/DL — SIGNIFICANT CHANGE UP (ref 7–23)
CALCIUM SERPL-MCNC: 9.1 MG/DL — SIGNIFICANT CHANGE UP (ref 8.4–10.5)
CALCIUM SERPL-MCNC: 9.3 MG/DL — SIGNIFICANT CHANGE UP (ref 8.4–10.5)
CHLORIDE SERPL-SCNC: 103 MMOL/L — SIGNIFICANT CHANGE UP (ref 98–107)
CHLORIDE SERPL-SCNC: 105 MMOL/L — SIGNIFICANT CHANGE UP (ref 98–107)
CO2 SERPL-SCNC: 22 MMOL/L — SIGNIFICANT CHANGE UP (ref 22–31)
CO2 SERPL-SCNC: 24 MMOL/L — SIGNIFICANT CHANGE UP (ref 22–31)
CREAT SERPL-MCNC: 0.65 MG/DL — SIGNIFICANT CHANGE UP (ref 0.5–1.3)
CREAT SERPL-MCNC: 0.78 MG/DL — SIGNIFICANT CHANGE UP (ref 0.5–1.3)
EOSINOPHIL # BLD AUTO: 0.29 K/UL — SIGNIFICANT CHANGE UP (ref 0–0.5)
EOSINOPHIL # BLD AUTO: 0.31 K/UL — SIGNIFICANT CHANGE UP (ref 0–0.5)
EOSINOPHIL NFR BLD AUTO: 3 % — SIGNIFICANT CHANGE UP (ref 0–6)
EOSINOPHIL NFR BLD AUTO: 3.7 % — SIGNIFICANT CHANGE UP (ref 0–6)
FIBRINOGEN PPP-MCNC: 784 MG/DL — HIGH (ref 290–520)
FIBRINOGEN PPP-MCNC: 798 MG/DL — HIGH (ref 290–520)
GLUCOSE SERPL-MCNC: 72 MG/DL — SIGNIFICANT CHANGE UP (ref 70–99)
GLUCOSE SERPL-MCNC: 85 MG/DL — SIGNIFICANT CHANGE UP (ref 70–99)
HCT VFR BLD CALC: 31.7 % — LOW (ref 34.5–45)
HCT VFR BLD CALC: 32 % — LOW (ref 34.5–45)
HGB BLD-MCNC: 9.1 G/DL — LOW (ref 11.5–15.5)
HGB BLD-MCNC: 9.1 G/DL — LOW (ref 11.5–15.5)
IANC: 6.02 K/UL — SIGNIFICANT CHANGE UP (ref 1.5–8.5)
IANC: 7.24 K/UL — SIGNIFICANT CHANGE UP (ref 1.5–8.5)
IMM GRANULOCYTES NFR BLD AUTO: 0.4 % — SIGNIFICANT CHANGE UP (ref 0–1.5)
IMM GRANULOCYTES NFR BLD AUTO: 0.5 % — SIGNIFICANT CHANGE UP (ref 0–1.5)
INR BLD: 1.01 RATIO — SIGNIFICANT CHANGE UP (ref 0.88–1.16)
INR BLD: 1.04 RATIO — SIGNIFICANT CHANGE UP (ref 0.88–1.16)
LDH SERPL L TO P-CCNC: 206 U/L — SIGNIFICANT CHANGE UP (ref 135–225)
LDH SERPL L TO P-CCNC: 248 U/L — HIGH (ref 135–225)
LYMPHOCYTES # BLD AUTO: 1.74 K/UL — SIGNIFICANT CHANGE UP (ref 1–3.3)
LYMPHOCYTES # BLD AUTO: 1.76 K/UL — SIGNIFICANT CHANGE UP (ref 1–3.3)
LYMPHOCYTES # BLD AUTO: 17.7 % — SIGNIFICANT CHANGE UP (ref 13–44)
LYMPHOCYTES # BLD AUTO: 20.7 % — SIGNIFICANT CHANGE UP (ref 13–44)
MAGNESIUM SERPL-MCNC: 4.7 MG/DL — HIGH (ref 1.6–2.6)
MAGNESIUM SERPL-MCNC: 5.5 MG/DL — HIGH (ref 1.6–2.6)
MCHC RBC-ENTMCNC: 23.2 PG — LOW (ref 27–34)
MCHC RBC-ENTMCNC: 23.2 PG — LOW (ref 27–34)
MCHC RBC-ENTMCNC: 28.4 GM/DL — LOW (ref 32–36)
MCHC RBC-ENTMCNC: 28.7 GM/DL — LOW (ref 32–36)
MCV RBC AUTO: 80.7 FL — SIGNIFICANT CHANGE UP (ref 80–100)
MCV RBC AUTO: 81.4 FL — SIGNIFICANT CHANGE UP (ref 80–100)
MONOCYTES # BLD AUTO: 0.33 K/UL — SIGNIFICANT CHANGE UP (ref 0–0.9)
MONOCYTES # BLD AUTO: 0.45 K/UL — SIGNIFICANT CHANGE UP (ref 0–0.9)
MONOCYTES NFR BLD AUTO: 3.9 % — SIGNIFICANT CHANGE UP (ref 2–14)
MONOCYTES NFR BLD AUTO: 4.6 % — SIGNIFICANT CHANGE UP (ref 2–14)
NEUTROPHILS # BLD AUTO: 6.02 K/UL — SIGNIFICANT CHANGE UP (ref 1.8–7.4)
NEUTROPHILS # BLD AUTO: 7.24 K/UL — SIGNIFICANT CHANGE UP (ref 1.8–7.4)
NEUTROPHILS NFR BLD AUTO: 70.8 % — SIGNIFICANT CHANGE UP (ref 43–77)
NEUTROPHILS NFR BLD AUTO: 73.6 % — SIGNIFICANT CHANGE UP (ref 43–77)
NRBC # BLD: 0 /100 WBCS — SIGNIFICANT CHANGE UP
NRBC # BLD: 0 /100 WBCS — SIGNIFICANT CHANGE UP
NRBC # FLD: 0 K/UL — SIGNIFICANT CHANGE UP
NRBC # FLD: 0 K/UL — SIGNIFICANT CHANGE UP
PLATELET # BLD AUTO: 432 K/UL — HIGH (ref 150–400)
PLATELET # BLD AUTO: 442 K/UL — HIGH (ref 150–400)
POTASSIUM SERPL-MCNC: 4.1 MMOL/L — SIGNIFICANT CHANGE UP (ref 3.5–5.3)
POTASSIUM SERPL-MCNC: 4.3 MMOL/L — SIGNIFICANT CHANGE UP (ref 3.5–5.3)
POTASSIUM SERPL-SCNC: 4.1 MMOL/L — SIGNIFICANT CHANGE UP (ref 3.5–5.3)
POTASSIUM SERPL-SCNC: 4.3 MMOL/L — SIGNIFICANT CHANGE UP (ref 3.5–5.3)
PROT SERPL-MCNC: 6.6 G/DL — SIGNIFICANT CHANGE UP (ref 6–8.3)
PROT SERPL-MCNC: 6.7 G/DL — SIGNIFICANT CHANGE UP (ref 6–8.3)
PROTHROM AB SERPL-ACNC: 11.6 SEC — SIGNIFICANT CHANGE UP (ref 10.6–13.6)
PROTHROM AB SERPL-ACNC: 11.9 SEC — SIGNIFICANT CHANGE UP (ref 10.6–13.6)
RBC # BLD: 3.93 M/UL — SIGNIFICANT CHANGE UP (ref 3.8–5.2)
RBC # BLD: 3.93 M/UL — SIGNIFICANT CHANGE UP (ref 3.8–5.2)
RBC # FLD: 15.9 % — HIGH (ref 10.3–14.5)
RBC # FLD: 16 % — HIGH (ref 10.3–14.5)
RH IG SCN BLD-IMP: POSITIVE — SIGNIFICANT CHANGE UP
SARS-COV-2 IGG SERPL QL IA: NEGATIVE — SIGNIFICANT CHANGE UP
SARS-COV-2 IGM SERPL IA-ACNC: 0.07 INDEX — SIGNIFICANT CHANGE UP
SARS-COV-2 RNA SPEC QL NAA+PROBE: SIGNIFICANT CHANGE UP
SODIUM SERPL-SCNC: 136 MMOL/L — SIGNIFICANT CHANGE UP (ref 135–145)
SODIUM SERPL-SCNC: 140 MMOL/L — SIGNIFICANT CHANGE UP (ref 135–145)
URATE SERPL-MCNC: 4.9 MG/DL — SIGNIFICANT CHANGE UP (ref 2.5–7)
URATE SERPL-MCNC: 5.1 MG/DL — SIGNIFICANT CHANGE UP (ref 2.5–7)
WBC # BLD: 8.49 K/UL — SIGNIFICANT CHANGE UP (ref 3.8–10.5)
WBC # BLD: 9.83 K/UL — SIGNIFICANT CHANGE UP (ref 3.8–10.5)
WBC # FLD AUTO: 8.49 K/UL — SIGNIFICANT CHANGE UP (ref 3.8–10.5)
WBC # FLD AUTO: 9.83 K/UL — SIGNIFICANT CHANGE UP (ref 3.8–10.5)

## 2021-01-22 PROCEDURE — 71045 X-RAY EXAM CHEST 1 VIEW: CPT | Mod: 26

## 2021-01-22 PROCEDURE — 93010 ELECTROCARDIOGRAM REPORT: CPT

## 2021-01-22 RX ORDER — MAGNESIUM SULFATE 500 MG/ML
2 VIAL (ML) INJECTION
Qty: 40 | Refills: 0 | Status: DISCONTINUED | OUTPATIENT
Start: 2021-01-22 | End: 2021-01-22

## 2021-01-22 RX ORDER — MAGNESIUM SULFATE 500 MG/ML
2 VIAL (ML) INJECTION
Qty: 40 | Refills: 0 | Status: DISCONTINUED | OUTPATIENT
Start: 2021-01-22 | End: 2021-01-23

## 2021-01-22 RX ORDER — NIFEDIPINE 30 MG
30 TABLET, EXTENDED RELEASE 24 HR ORAL DAILY
Refills: 0 | Status: DISCONTINUED | OUTPATIENT
Start: 2021-01-22 | End: 2021-01-22

## 2021-01-22 RX ORDER — SODIUM CHLORIDE 9 MG/ML
1000 INJECTION, SOLUTION INTRAVENOUS
Refills: 0 | Status: DISCONTINUED | OUTPATIENT
Start: 2021-01-22 | End: 2021-01-23

## 2021-01-22 RX ORDER — ACETAMINOPHEN 500 MG
975 TABLET ORAL EVERY 6 HOURS
Refills: 0 | Status: DISCONTINUED | OUTPATIENT
Start: 2021-01-22 | End: 2021-01-23

## 2021-01-22 RX ORDER — MONTELUKAST 4 MG/1
10 TABLET, CHEWABLE ORAL AT BEDTIME
Refills: 0 | Status: DISCONTINUED | OUTPATIENT
Start: 2021-01-22 | End: 2021-01-23

## 2021-01-22 RX ORDER — IBUPROFEN 200 MG
600 TABLET ORAL EVERY 6 HOURS
Refills: 0 | Status: DISCONTINUED | OUTPATIENT
Start: 2021-01-22 | End: 2021-01-23

## 2021-01-22 RX ORDER — MAGNESIUM SULFATE 500 MG/ML
4 VIAL (ML) INJECTION ONCE
Refills: 0 | Status: COMPLETED | OUTPATIENT
Start: 2021-01-22 | End: 2021-01-22

## 2021-01-22 RX ORDER — CIPROFLOXACIN LACTATE 400MG/40ML
500 VIAL (ML) INTRAVENOUS EVERY 12 HOURS
Refills: 0 | Status: DISCONTINUED | OUTPATIENT
Start: 2021-01-22 | End: 2021-01-23

## 2021-01-22 RX ORDER — MAGNESIUM SULFATE 500 MG/ML
4 VIAL (ML) INJECTION ONCE
Refills: 0 | Status: DISCONTINUED | OUTPATIENT
Start: 2021-01-22 | End: 2021-01-22

## 2021-01-22 RX ORDER — NIFEDIPINE 30 MG
30 TABLET, EXTENDED RELEASE 24 HR ORAL DAILY
Refills: 0 | Status: DISCONTINUED | OUTPATIENT
Start: 2021-01-22 | End: 2021-01-23

## 2021-01-22 RX ADMIN — Medication 200 GRAM(S): at 09:18

## 2021-01-22 RX ADMIN — Medication 50 GM/HR: at 19:09

## 2021-01-22 RX ADMIN — Medication 50 GM/HR: at 09:40

## 2021-01-22 RX ADMIN — Medication 500 MILLIGRAM(S): at 11:12

## 2021-01-22 RX ADMIN — SODIUM CHLORIDE 75 MILLILITER(S): 9 INJECTION, SOLUTION INTRAVENOUS at 09:18

## 2021-01-22 RX ADMIN — Medication 975 MILLIGRAM(S): at 17:40

## 2021-01-22 RX ADMIN — MONTELUKAST 10 MILLIGRAM(S): 4 TABLET, CHEWABLE ORAL at 23:12

## 2021-01-22 RX ADMIN — Medication 600 MILLIGRAM(S): at 21:52

## 2021-01-22 RX ADMIN — Medication 500 MILLIGRAM(S): at 23:12

## 2021-01-22 RX ADMIN — Medication 30 MILLIGRAM(S): at 09:41

## 2021-01-22 RX ADMIN — Medication 50 GM/HR: at 14:28

## 2021-01-22 NOTE — H&P ADULT - HISTORY OF PRESENT ILLNESS
30 y.o.  s/p repeat c section 1/15/21. Pt reports her BP yesterday at home was 136/100 and 139/96. Pt reports pedal edema yesterday. Pt denies n/v, ruq pain, vision changes. Pt reports a HA x 2 days but unsure if it is from a current sinus infection that she is being treated for. Pt also reports chest heaviness, SOB and a sore upper back.    PMHx - denies  PSHx -    hernia repair   ASD repair (Dr. Pride - cardiologist)  2017 abdominoplasty  2018 breas reduction  Ob -   TOP x 1  2007 35 wk C/S PEC 4lb 8oz  3/21/2014 FTC/S 7lb 5oz GDMA1  1/15/2021 FTC/S 8lb 1oz GDMA2  Gyn - denies  NKDA  Meds - Cipro, Mupinatyon, Montelukast

## 2021-01-22 NOTE — H&P ADULT - NSHPPHYSICALEXAM_GEN_ALL_CORE
BP in D&T:   134/78  138/81  142/83  148/92  150/85  141/64  143/81  144/85  150/84  149/99  156/94  158/96  146/89  Lungs CTAB  Heart rate and rhythm regular  abdomen soft and nontender  no edema noted  DTRs +1 bilaterally

## 2021-01-22 NOTE — DISCHARGE NOTE PROVIDER - NSDCFUADDAPPT_GEN_ALL_CORE_FT
- Continue BP meds as prescribed (hold is BP is under 110/60 )  -Take blood pressure with at home cuff prior to taking medications; if BP is >150/90 call MD  - Return to hospital with headaches, visual changes, abdominal pain, nausea, vomiting, chest pain or shortness of breathe  - Follow up with OB in 2 days for BP check  - Follow up with [facility] (call [phone])

## 2021-01-22 NOTE — H&P ADULT - PROBLEM SELECTOR PLAN 1
Admit to labor and delivery for hypertensive disorder in the post-partum period  - admit to labor and delivery  - for Magnesium Sulfate and Procardia   - for repeat HELLP labs  - for chest x-ray  - to continue on medication for sinus infection, Cipro 500 mg, twice a day and Singulair   - plan above discussed in safety rounds with  and

## 2021-01-22 NOTE — H&P ADULT - NSHPLABSRESULTS_GEN_ALL_CORE
9.1    9.83  )-----------( 432      ( 22 Jan 2021 04:17 )             32.0     01-22    136  |  103  |  10  ----------------------------<  85  4.3   |  22  |  0.78    Ca    9.1      22 Jan 2021 04:17    TPro  6.7  /  Alb  3.0<L>  /  TBili  0.3  /  DBili  x   /  AST  18  /  ALT  14  /  AlkPhos  168<H>  01-22    PT/INR - ( 22 Jan 2021 04:17 )   PT: 11.6 sec;   INR: 1.01 ratio         PTT - ( 22 Jan 2021 04:17 )  PTT:29.9 sec    Fibrinogen 784    EKG - normal sinus rhythm with sinus arrhythmia.

## 2021-01-22 NOTE — DISCHARGE NOTE PROVIDER - NSDCMRMEDTOKEN_GEN_ALL_CORE_FT
acetaminophen 325 mg oral tablet: 3 tab(s) orally   ibuprofen 600 mg oral tablet: 1 tab(s) orally every 6 hours  Oxaydo 5 mg oral tablet: 1 tab(s) orally every 6 hours for SEVERE pain MDD:4 tablets   acetaminophen 325 mg oral tablet: 3 tab(s) orally   ciprofloxacin 500 mg oral tablet: 1 tab(s) orally every 12 hours  ibuprofen 600 mg oral tablet: 1 tab(s) orally every 6 hours  montelukast 10 mg oral tablet: 1 tab(s) orally once a day (at bedtime)  NIFEdipine 30 mg oral tablet, extended release: 1 tab(s) orally once a day

## 2021-01-22 NOTE — DISCHARGE NOTE PROVIDER - HOSPITAL COURSE
31yo  s/p repeat c section 1/15/21 readmitted for pp PEC. With elevated BPs to 150s systolic and SOB/chest heaviness. Pt received  MgSo4 and procardia 30XL on admission. Pt denies n/v, ruq pain, vision changes. Pt reports a HA x 2 days but unsure if it is from a current sinus infection that she is being treated for. Pt also reports chest heaviness, SOB and a sore upper back. 31yo  s/p repeat c section 1/15/21 readmitted for pp PEC. With elevated BPs to 150s systolic and SOB/chest heaviness. Pt received  MgSo4 and procardia 30XL on admission. Pt denies n/v, ruq pain, vision changes. Pt reports a HA x 2 days but unsure if it is from a current sinus infection that she is being treated for. Pt also reports chest heaviness, SOB and a sore upper back. All symptoms resolved shortly after admission, and chest XR was negative for pulmonary edema. Throughout her hospital stay, patient was normotensive with oral medications. She finished a 24h course of magnesium for seizure prophylaxis on HD#2 and continued to be normotensive and asymptomatic throughout the day. Patient was discharged on HD#2 with normal blood pressures and no symptoms. She is to have close outpatient follow up with [facility].

## 2021-01-22 NOTE — DISCHARGE NOTE PROVIDER - PROVIDER TOKENS
FREE:[LAST:[[facility]],PHONE:[phone]],FAX:[(   )    -],ADDRESS:[[facility]  Ambulator Care Unit  Oncology [facility] Level],FOLLOWUP:[1-3 days]]

## 2021-01-22 NOTE — DISCHARGE NOTE PROVIDER - CARE PROVIDER_API CALL
[facility],   [facility]  Ambulator Care Unit  Oncology Building  [place] Level  Phone: [phone]  Fax: (   )    -  Follow Up Time: 1-3 days

## 2021-01-22 NOTE — H&P ADULT - NSICDXPASTMEDICALHX_GEN_ALL_CORE_FT
PAST MEDICAL HISTORY:  ASD (atrial septal defect) H/o    GDM (gestational diabetes mellitus)     Hernia h/o    History of termination of pregnancy x1    HTN (hypertension) h/o  1st pregnancy

## 2021-01-22 NOTE — DISCHARGE NOTE PROVIDER - NSDCCPCAREPLAN_GEN_ALL_CORE_FT
PRINCIPAL DISCHARGE DIAGNOSIS  Diagnosis: Postpartum hypertension  Assessment and Plan of Treatment: - Continue BP meds as prescribed (hold is BP is under 110/60 )  -Take blood pressure with at home cuff prior to taking medications; if BP is >150/90 call MD  - Return to hospital with headaches, visual changes, abdominal pain, nausea, vomiting, chest pain or shortness of breathe  - Follow up with OB in 2 days for BP check  - Follow up with [facility] (call [phone])

## 2021-01-22 NOTE — H&P ADULT - NSICDXPASTSURGICALHX_GEN_ALL_CORE_FT
PAST SURGICAL HISTORY:  ASD (atrial septal defect) repair 2002; followed by Dr Cortez    H/O abdominoplasty 2017    H/O bilateral breast reduction surgery 2018    H/O  section 2007 35wk PEC 4#8 male  3/21/2014 scheduled c/s GDMA1 7#5 male    Hernia b/l     Preeclampsia 2007

## 2021-01-22 NOTE — CHART NOTE - NSCHARTNOTEFT_GEN_A_CORE
30 y.o.  s/p repeat c section 1/15/21. Pt reports her BP yesterday at home was 136/100 and 139/96. Pt reports pedal edema yesterday. Pt denies n/v, ruq pain, vision changes. Pt reports a HA x 2 days but unsure if it is from a current sinus infection that she is being treated for. Pt also reports chest heaviness, SOB and a sore upper back.    PMHx - denies  PSHx -    hernia repair   ASD repair (Dr. Pride - cardiologist)  2017 abdominoplasty  2018 breas reduction  Ob -   TOP x 1  2007 35 wk C/S PEC 4lb 8oz  3/21/2014 FTC/S 7lb 5oz GDMA1  1/15/2021 FTC/S 8lb 1oz GDMA2  Gyn - denies  NKDA  Meds - Cipro, Mupiricon, Montelukast    Lungs CTAB  Heart rate and rhythm regular  abdomen soft and nontender  no edema noted  DTRs +1 bilaterally    HELLP labs pending    Vital Signs:  124/73 HR 80  140/86 HR 80 30 y.o.  s/p repeat c section 1/15/21. Pt reports her BP yesterday at home was 136/100 and 139/96. Pt reports pedal edema yesterday. Pt denies n/v, ruq pain, vision changes. Pt reports a HA x 2 days but unsure if it is from a current sinus infection that she is being treated for. Pt also reports chest heaviness, SOB and a sore upper back.    PMHx - denies  PSHx -    hernia repair   ASD repair (Dr. Pride - cardiologist)  2017 abdominoplasty  2018 breas reduction  Ob -   TOP x 1  2007 35 wk C/S PEC 4lb 8oz  3/21/2014 FTC/S 7lb 5oz GDMA1  1/15/2021 FTC/S 8lb 1oz GDMA2  Gyn - denies  NKDA  Meds - Cipro, Mupiricon, Montelukast    Lungs CTAB  Heart rate and rhythm regular  abdomen soft and nontender  no edema noted  DTRs +1 bilaterally    HELLP labs pending  EKG and chest x ray ordered    Vital Signs:  124/73 HR 80  140/86 HR 80  136/85 HR 76  137/85 HR 78  134/78 HR 83    Pulse ox 94-98% on Room air    0520:  labs reviewed                          9.1    9.83  )-----------( 432      ( 2021 04:17 )             32.0         136  |  103  |  10  ----------------------------<  85  4.3   |  22  |  0.78    Ca    9.1      2021 04:17    TPro  6.7  /  Alb  3.0<L>  /  TBili  0.3  /  DBili  x   /  AST  18  /  ALT  14  /  AlkPhos  168<H>      PT/INR - ( 2021 04:17 )   PT: 11.6 sec;   INR: 1.01 ratio         PTT - ( 2021 04:17 )  PTT:29.9 sec    Fibrinogen 784    EKG - normal sinus rhythm with sinus arrhythmia

## 2021-01-23 ENCOUNTER — TRANSCRIPTION ENCOUNTER (OUTPATIENT)
Age: 31
End: 2021-01-23

## 2021-01-23 VITALS
OXYGEN SATURATION: 98 % | SYSTOLIC BLOOD PRESSURE: 126 MMHG | DIASTOLIC BLOOD PRESSURE: 85 MMHG | RESPIRATION RATE: 18 BRPM | HEART RATE: 85 BPM | TEMPERATURE: 98 F

## 2021-01-23 LAB
ALBUMIN SERPL ELPH-MCNC: 3.4 G/DL — SIGNIFICANT CHANGE UP (ref 3.3–5)
ALP SERPL-CCNC: 168 U/L — HIGH (ref 40–120)
ALT FLD-CCNC: 12 U/L — SIGNIFICANT CHANGE UP (ref 4–33)
ANION GAP SERPL CALC-SCNC: 13 MMOL/L — SIGNIFICANT CHANGE UP (ref 7–14)
APTT BLD: 30.4 SEC — SIGNIFICANT CHANGE UP (ref 27–36.3)
AST SERPL-CCNC: 12 U/L — SIGNIFICANT CHANGE UP (ref 4–32)
BASOPHILS # BLD AUTO: 0.06 K/UL — SIGNIFICANT CHANGE UP (ref 0–0.2)
BASOPHILS NFR BLD AUTO: 0.7 % — SIGNIFICANT CHANGE UP (ref 0–2)
BILIRUB SERPL-MCNC: 0.2 MG/DL — SIGNIFICANT CHANGE UP (ref 0.2–1.2)
BUN SERPL-MCNC: 7 MG/DL — SIGNIFICANT CHANGE UP (ref 7–23)
CALCIUM SERPL-MCNC: 7.9 MG/DL — LOW (ref 8.4–10.5)
CHLORIDE SERPL-SCNC: 101 MMOL/L — SIGNIFICANT CHANGE UP (ref 98–107)
CO2 SERPL-SCNC: 23 MMOL/L — SIGNIFICANT CHANGE UP (ref 22–31)
CREAT SERPL-MCNC: 0.61 MG/DL — SIGNIFICANT CHANGE UP (ref 0.5–1.3)
EOSINOPHIL # BLD AUTO: 0.36 K/UL — SIGNIFICANT CHANGE UP (ref 0–0.5)
EOSINOPHIL NFR BLD AUTO: 4.4 % — SIGNIFICANT CHANGE UP (ref 0–6)
FIBRINOGEN PPP-MCNC: 805 MG/DL — HIGH (ref 290–520)
GLUCOSE SERPL-MCNC: 86 MG/DL — SIGNIFICANT CHANGE UP (ref 70–99)
HCT VFR BLD CALC: 32.9 % — LOW (ref 34.5–45)
HGB BLD-MCNC: 9.7 G/DL — LOW (ref 11.5–15.5)
IANC: 5.35 K/UL — SIGNIFICANT CHANGE UP (ref 1.5–8.5)
IMM GRANULOCYTES NFR BLD AUTO: 0.4 % — SIGNIFICANT CHANGE UP (ref 0–1.5)
INR BLD: 0.95 RATIO — SIGNIFICANT CHANGE UP (ref 0.88–1.16)
LDH SERPL L TO P-CCNC: 197 U/L — SIGNIFICANT CHANGE UP (ref 135–225)
LYMPHOCYTES # BLD AUTO: 1.88 K/UL — SIGNIFICANT CHANGE UP (ref 1–3.3)
LYMPHOCYTES # BLD AUTO: 23.2 % — SIGNIFICANT CHANGE UP (ref 13–44)
MAGNESIUM SERPL-MCNC: 5.6 MG/DL — HIGH (ref 1.6–2.6)
MCHC RBC-ENTMCNC: 23.3 PG — LOW (ref 27–34)
MCHC RBC-ENTMCNC: 29.5 GM/DL — LOW (ref 32–36)
MCV RBC AUTO: 79.1 FL — LOW (ref 80–100)
MONOCYTES # BLD AUTO: 0.42 K/UL — SIGNIFICANT CHANGE UP (ref 0–0.9)
MONOCYTES NFR BLD AUTO: 5.2 % — SIGNIFICANT CHANGE UP (ref 2–14)
NEUTROPHILS # BLD AUTO: 5.35 K/UL — SIGNIFICANT CHANGE UP (ref 1.8–7.4)
NEUTROPHILS NFR BLD AUTO: 66.1 % — SIGNIFICANT CHANGE UP (ref 43–77)
NRBC # BLD: 0 /100 WBCS — SIGNIFICANT CHANGE UP
NRBC # FLD: 0 K/UL — SIGNIFICANT CHANGE UP
PLATELET # BLD AUTO: 492 K/UL — HIGH (ref 150–400)
POTASSIUM SERPL-MCNC: 3.6 MMOL/L — SIGNIFICANT CHANGE UP (ref 3.5–5.3)
POTASSIUM SERPL-SCNC: 3.6 MMOL/L — SIGNIFICANT CHANGE UP (ref 3.5–5.3)
PROT SERPL-MCNC: 6.8 G/DL — SIGNIFICANT CHANGE UP (ref 6–8.3)
PROTHROM AB SERPL-ACNC: 11 SEC — SIGNIFICANT CHANGE UP (ref 10.6–13.6)
RBC # BLD: 4.16 M/UL — SIGNIFICANT CHANGE UP (ref 3.8–5.2)
RBC # FLD: 16.1 % — HIGH (ref 10.3–14.5)
SODIUM SERPL-SCNC: 137 MMOL/L — SIGNIFICANT CHANGE UP (ref 135–145)
URATE SERPL-MCNC: 4.8 MG/DL — SIGNIFICANT CHANGE UP (ref 2.5–7)
WBC # BLD: 8.1 K/UL — SIGNIFICANT CHANGE UP (ref 3.8–10.5)
WBC # FLD AUTO: 8.1 K/UL — SIGNIFICANT CHANGE UP (ref 3.8–10.5)

## 2021-01-23 PROCEDURE — 99232 SBSQ HOSP IP/OBS MODERATE 35: CPT | Mod: GC

## 2021-01-23 RX ORDER — HEPARIN SODIUM 5000 [USP'U]/ML
5000 INJECTION INTRAVENOUS; SUBCUTANEOUS EVERY 12 HOURS
Refills: 0 | Status: DISCONTINUED | OUTPATIENT
Start: 2021-01-23 | End: 2021-01-23

## 2021-01-23 RX ORDER — CIPROFLOXACIN LACTATE 400MG/40ML
1 VIAL (ML) INTRAVENOUS
Qty: 14 | Refills: 0
Start: 2021-01-23 | End: 2021-01-29

## 2021-01-23 RX ORDER — NIFEDIPINE 30 MG
1 TABLET, EXTENDED RELEASE 24 HR ORAL
Qty: 28 | Refills: 0
Start: 2021-01-23 | End: 2021-02-19

## 2021-01-23 RX ORDER — MONTELUKAST 4 MG/1
1 TABLET, CHEWABLE ORAL
Qty: 0 | Refills: 0 | DISCHARGE
Start: 2021-01-23

## 2021-01-23 RX ADMIN — Medication 600 MILLIGRAM(S): at 16:03

## 2021-01-23 RX ADMIN — MONTELUKAST 10 MILLIGRAM(S): 4 TABLET, CHEWABLE ORAL at 22:16

## 2021-01-23 RX ADMIN — Medication 600 MILLIGRAM(S): at 09:46

## 2021-01-23 RX ADMIN — Medication 600 MILLIGRAM(S): at 04:01

## 2021-01-23 RX ADMIN — Medication 500 MILLIGRAM(S): at 12:20

## 2021-01-23 RX ADMIN — Medication 50 GM/HR: at 07:21

## 2021-01-23 RX ADMIN — Medication 30 MILLIGRAM(S): at 09:46

## 2021-01-23 RX ADMIN — Medication 600 MILLIGRAM(S): at 22:16

## 2021-01-23 RX ADMIN — HEPARIN SODIUM 5000 UNIT(S): 5000 INJECTION INTRAVENOUS; SUBCUTANEOUS at 18:11

## 2021-01-23 NOTE — PROGRESS NOTE ADULT - SUBJECTIVE AND OBJECTIVE BOX
Patient is a 30y old  Female who presents with a chief complaint of post partum PEC (2021 02:46)      HPI:  30 y.o.  s/p repeat c section 1/15/21. Pt reports her BP yesterday at home was 136/100 and 139/96. Pt reports pedal edema yesterday. Pt denies n/v, ruq pain, vision changes. Pt reports a HA x 2 days but unsure if it is from a current sinus infection that she is being treated for. Pt also reports chest heaviness, SOB and a sore upper back.    PMHx - denies  PSHx -   1998 hernia repair   ASD repair (Dr. Pride - cardiologist)  2017 abdominoplasty  2018 breas reduction  Ob -   TOP x 1  2007 35 wk C/S PEC 4lb 8oz  3/21/2014 FTC/S 7lb 5oz GDMA1  1/15/2021 FTC/S 8lb 1oz GDMA2  Gyn - denies  NKDA  Meds - Cipro, Mupiricon, Montelukast (2021 08:43)      PAST MEDICAL & SURGICAL HISTORY:  GDM (gestational diabetes mellitus)  GDMA1 , GDMA2     History of termination of pregnancy  x1    Hernia  h/o  inguinal hernia repair     HTN (hypertension)  h/o  1st pregnancy    ASD (atrial septal defect)  H/o  repair     H/O bilateral breast reduction surgery  2018    H/O abdominoplasty  2017    Preeclampsia  2007    Hernia  b/l 1998    ASD (atrial septal defect)  repair 2002; followed by Dr Cortez    H/O  section  2007 35wk PEC 4#8 male  3/21/2014 scheduled c/s GDMA1 7#5 male        MEDICATIONS  (STANDING):  acetaminophen   Tablet .. 975 milliGRAM(s) Oral every 6 hours  ciprofloxacin     Tablet 500 milliGRAM(s) Oral every 12 hours  heparin   Injectable 5000 Unit(s) SubCutaneous every 12 hours  lactated ringers. 1000 milliLiter(s) (75 mL/Hr) IV Continuous <Continuous>  magnesium sulfate Infusion 2 Gm/Hr (50 mL/Hr) IV Continuous <Continuous>  montelukast 10 milliGRAM(s) Oral at bedtime  NIFEdipine XL 30 milliGRAM(s) Oral daily              Vital Signs Last 24 Hrs  T(C): 36.7 (2021 06:09), Max: 36.8 (2021 22:00)  T(F): 98 (2021 06:09), Max: 98.2 (2021 22:00)  HR: 80 (2021 09:34) (75 - 98)  BP: 136/85 (2021 09:34) (113/72 - 139/85)  BP(mean): 107 (2021 13:38) (107 - 107)  RR: 16 (2021 09:34) (16 - 20)  SpO2: 99% (2021 09:34) (95% - 99%)    PHYSICAL EXAM:  No complains  Blood pressure is well controlled  Will discontinue magnesium sulphate this morning  continu BP monitoring  Possibl;e discharge tomorrow if Blood pressure well controlled    2021 07:01  -  2021 07:00  --------------------------------------------------------  IN: 2725 mL / OUT: 7100 mL / NET: -4375 mL        LABORATORY:                        9.7    8.10  )-----------( 492      ( 2021 04:56 )             32.9         137  |  101  |  7   ----------------------------<  86  3.6   |  23  |  0.61    Ca    7.9<L>      2021 04:56  Mg     5.6         TPro  6.8  /  Alb  3.4  /  TBili  0.2  /  DBili  x   /  AST  12  /  ALT  12  /  AlkPhos  168<H>  23    PT/INR - ( 2021 04:56 )   PT: 11.0 sec;   INR: 0.95 ratio         PTT - ( 2021 04:56 )  PTT:30.4 sec  
OB Progress Note    S: Denies persistent HA, blurry vision, RUQ pain, edema. Reports some head congestion in the setting of sinus infection. Reports some anxiety, planning to follow up at [facility] after discharge. Her pain is well controlled. She is tolerating a regular diet and passing flatus. Denies N/V. Denies CP/SOB/lightheadedness/dizziness. Voiding, Ambulating.     O:   Vital Signs Last 24 Hrs  T(C): 36.8 (2021 02:00), Max: 37.2 (2021 08:39)  T(F): 98.2 (2021 02:00), Max: 99 (2021 08:39)  HR: 89 (2021 02:00) (75 - 98)  BP: 119/73 (2021 02:00) (119/73 - 140/93)  BP(mean): 107 (2021 13:38) (107 - 107)  RR: 20 (2021 02:00) (16 - 20)  SpO2: 99% (2021 02:00) (95% - 99%)    Labs:  Blood type: O Positive  Rubella IgG: RPR: Negative                          9.1<L>   8.49 >-----------< 442<H>    (  @ 11:13 )             31.7<L>                        9.1<L>   9.83 >-----------< 432<H>    (  @ 04:17 )             32.0<L>    21 @ 11:13      140  |  105  |  9   ----------------------------<  72  4.1   |  24  |  0.65    21 @ 04:17      136  |  103  |  10  ----------------------------<  85  4.3   |  22  |  0.78        Ca    9.3      2021 11:13  Ca    9.1      2021 04:17  Mg     5.5<H>       Mg     4.7<H>         TPro  6.6  /  Alb  3.3  /  TBili  0.3  /  DBili  x   /  AST  14  /  ALT  13  /  AlkPhos  162<H>  21 @ 11:13  TPro  6.7  /  Alb  3.0<L>  /  TBili  0.3  /  DBili  x   /  AST  18  /  ALT  14  /  AlkPhos  168<H>  21 @ 04:17          acetaminophen   Tablet .. 975 milliGRAM(s) Oral every 6 hours  ciprofloxacin     Tablet 500 milliGRAM(s) Oral every 12 hours  ibuprofen  Tablet. 600 milliGRAM(s) Oral every 6 hours PRN  lactated ringers. 1000 milliLiter(s) IV Continuous <Continuous>  magnesium sulfate Infusion 2 Gm/Hr IV Continuous <Continuous>  montelukast 10 milliGRAM(s) Oral at bedtime  NIFEdipine XL 30 milliGRAM(s) Oral daily      PE:  General: NAD  Abdomen: Mildly distended, appropriately tender, incision c/d/i.  Extremities: No erythema, no pitting edema

## 2021-01-23 NOTE — DISCHARGE NOTE NURSING/CASE MANAGEMENT/SOCIAL WORK - PATIENT PORTAL LINK FT
You can access the FollowMyHealth Patient Portal offered by [facility] by registering at the following website: [email or web address]. By joining [employer]’s FollowMyHealth portal, you will also be able to view your health information using other applications (apps) compatible with our system.

## 2021-01-23 NOTE — PROGRESS NOTE ADULT - ASSESSMENT
A/P: 29yo POD#8 s/p rLTCS admitted with PP sPEC (SOB, elevated BPs). On Mag. BPs well controlled overnight. SOB resolved, patient thought it was likely 2/2 anxiety. Patient stable at this time.    #sPEC  - no consecutive severe range BPs requiring pushes  - EKG wnl  - c/w Mag x24 hours for seizure ppx  - c/w Procardia 30 XL QD  - AM HELLP labs  - f/u CXR read  - c/w BP monitoring    #Postpartum state  - Continue regular diet.  - Increase ambulation.  - Continue motrin, tylenol, oxycodone PRN for pain control  - [facility] outpatient for anxiety, mild    CHailee Zuniga PGY3

## 2021-01-25 PROBLEM — O24.419 GESTATIONAL DIABETES MELLITUS IN PREGNANCY, UNSPECIFIED CONTROL: Chronic | Status: ACTIVE | Noted: 2021-01-12

## 2021-01-26 ENCOUNTER — APPOINTMENT (OUTPATIENT)
Dept: OBGYN | Facility: HOSPITAL | Age: 31
End: 2021-01-26

## 2021-01-26 ENCOUNTER — NON-APPOINTMENT (OUTPATIENT)
Age: 31
End: 2021-01-26

## 2021-01-28 ENCOUNTER — APPOINTMENT (OUTPATIENT)
Dept: OBGYN | Facility: HOSPITAL | Age: 31
End: 2021-01-28

## 2021-01-28 ENCOUNTER — NON-APPOINTMENT (OUTPATIENT)
Age: 31
End: 2021-01-28

## 2021-01-28 ENCOUNTER — OUTPATIENT (OUTPATIENT)
Dept: OUTPATIENT SERVICES | Facility: HOSPITAL | Age: 31
LOS: 1 days | End: 2021-01-28

## 2021-01-28 VITALS
BODY MASS INDEX: 29.74 KG/M2 | HEIGHT: 67 IN | TEMPERATURE: 99 F | DIASTOLIC BLOOD PRESSURE: 74 MMHG | SYSTOLIC BLOOD PRESSURE: 120 MMHG | WEIGHT: 189.5 LBS | HEART RATE: 80 BPM

## 2021-01-28 DIAGNOSIS — Q21.1 ATRIAL SEPTAL DEFECT: Chronic | ICD-10-CM

## 2021-01-28 DIAGNOSIS — Z98.890 OTHER SPECIFIED POSTPROCEDURAL STATES: Chronic | ICD-10-CM

## 2021-01-28 DIAGNOSIS — O14.90 UNSPECIFIED PRE-ECLAMPSIA, UNSPECIFIED TRIMESTER: Chronic | ICD-10-CM

## 2021-01-28 DIAGNOSIS — K46.9 UNSPECIFIED ABDOMINAL HERNIA WITHOUT OBSTRUCTION OR GANGRENE: Chronic | ICD-10-CM

## 2021-01-28 DIAGNOSIS — Z98.89 OTHER SPECIFIED POSTPROCEDURAL STATES: Chronic | ICD-10-CM

## 2021-01-28 NOTE — HISTORY OF PRESENT ILLNESS
[Postpartum Follow Up] : postpartum follow up [Complications:___] : complications include: [unfilled] [Delivery Date: ___] : on [unfilled] [Repeat C/S] : delivered by  section (repeat) [Female] : Delivery History: baby girl [Wt. ___] : weighing [unfilled] [BTL] : no tubal ligation [Breastfeeding] : not currently nursing [Resumed Menses] : has not resumed her menses [Resumed [place]] : has not resumed intercourse [Intended Contraception] : the patient does not intended to use contraception postpartum [Clean/Dry/Intact] : clean, dry and intact [Erythema] : not erythematous [Swelling] : swollen [Healed] : healed [Examination Of The Breasts] : breasts are normal [Doing Well] : is doing well [No Sign of Infection] : is showing no signs of infection [Excellent Pain Control] : has excellent pain control [None] : None [de-identified] : S/p  2 weeks, bottle feeding, s/p preeclampsia on Nifedipine, axiety [de-identified] : Advised constinued home BP monitoring before meds, hold Nifedipine if BP <120/80, socail work consult today, continue to try and obtain outpatient counseling at [facility] for anxiety, PP birth control options reviewed, RTC 4 weeks.

## 2021-02-01 ENCOUNTER — APPOINTMENT (OUTPATIENT)
Dept: CARDIOLOGY | Facility: CLINIC | Age: 31
End: 2021-02-01
Payer: MEDICAID

## 2021-02-01 ENCOUNTER — NON-APPOINTMENT (OUTPATIENT)
Age: 31
End: 2021-02-01

## 2021-02-01 DIAGNOSIS — Z71.89 OTHER SPECIFIED COUNSELING: ICD-10-CM

## 2021-02-01 PROCEDURE — ZZZZZ: CPT

## 2021-02-01 NOTE — PHYSICAL EXAM
[General Appearance - Well Developed] : well developed [Normal Appearance] : normal appearance [General Appearance - Well Nourished] : well nourished [Well Groomed] : well groomed [No Deformities] : no deformities [General Appearance - In No Acute Distress] : no acute distress [FreeTextEntry1] : Complete PE not done due to telehealth visit

## 2021-02-01 NOTE — HISTORY OF PRESENT ILLNESS
[FreeTextEntry1] : Delivered 1/15/21\par Was readmitted for PEC and given Mg\par On Nifedipine 30 mg daily\par Feels well. Hasn't taken Nifedipine in 2 days due to lower BP and today was 122/80

## 2021-02-01 NOTE — DISCUSSION/SUMMARY
[FreeTextEntry1] : 30 year old woman sp delivery on 1/15/21 and postpartum PEC\par Continue Nifedipine and hold if BP less 125/88\par FU in 2 weeks

## 2021-02-02 DIAGNOSIS — Z71.89 OTHER SPECIFIED COUNSELING: ICD-10-CM

## 2021-02-08 ENCOUNTER — NON-APPOINTMENT (OUTPATIENT)
Age: 31
End: 2021-02-08

## 2021-02-10 ENCOUNTER — OUTPATIENT (OUTPATIENT)
Dept: OUTPATIENT SERVICES | Facility: HOSPITAL | Age: 31
LOS: 1 days | Discharge: HOME | End: 2021-02-10

## 2021-02-10 DIAGNOSIS — Z98.890 OTHER SPECIFIED POSTPROCEDURAL STATES: Chronic | ICD-10-CM

## 2021-02-10 DIAGNOSIS — K46.9 UNSPECIFIED ABDOMINAL HERNIA WITHOUT OBSTRUCTION OR GANGRENE: Chronic | ICD-10-CM

## 2021-02-10 DIAGNOSIS — F43.12 POST-TRAUMATIC STRESS DISORDER, CHRONIC: ICD-10-CM

## 2021-02-10 DIAGNOSIS — F60.3 BORDERLINE PERSONALITY DISORDER: ICD-10-CM

## 2021-02-10 DIAGNOSIS — O14.90 UNSPECIFIED PRE-ECLAMPSIA, UNSPECIFIED TRIMESTER: Chronic | ICD-10-CM

## 2021-02-10 DIAGNOSIS — Q21.1 ATRIAL SEPTAL DEFECT: Chronic | ICD-10-CM

## 2021-02-10 DIAGNOSIS — Z98.89 OTHER SPECIFIED POSTPROCEDURAL STATES: Chronic | ICD-10-CM

## 2021-02-12 ENCOUNTER — RX RENEWAL (OUTPATIENT)
Age: 31
End: 2021-02-12

## 2021-02-17 ENCOUNTER — APPOINTMENT (OUTPATIENT)
Dept: OTOLARYNGOLOGY | Facility: CLINIC | Age: 31
End: 2021-02-17
Payer: MEDICAID

## 2021-02-17 ENCOUNTER — NON-APPOINTMENT (OUTPATIENT)
Age: 31
End: 2021-02-17

## 2021-02-17 ENCOUNTER — RX RENEWAL (OUTPATIENT)
Age: 31
End: 2021-02-17

## 2021-02-17 VITALS
DIASTOLIC BLOOD PRESSURE: 79 MMHG | BODY MASS INDEX: 29.66 KG/M2 | HEIGHT: 67 IN | TEMPERATURE: 97.2 F | SYSTOLIC BLOOD PRESSURE: 123 MMHG | WEIGHT: 189 LBS | HEART RATE: 45 BPM

## 2021-02-17 DIAGNOSIS — R09.82 POSTNASAL DRIP: ICD-10-CM

## 2021-02-17 DIAGNOSIS — J34.89 OTHER SPECIFIED DISORDERS OF NOSE AND NASAL SINUSES: ICD-10-CM

## 2021-02-17 PROCEDURE — 99072 ADDL SUPL MATRL&STAF TM PHE: CPT

## 2021-02-17 PROCEDURE — 31231 NASAL ENDOSCOPY DX: CPT

## 2021-02-17 PROCEDURE — 99204 OFFICE O/P NEW MOD 45 MIN: CPT | Mod: 25

## 2021-02-17 RX ORDER — NIFEDIPINE 30 MG/1
30 TABLET, EXTENDED RELEASE ORAL
Qty: 28 | Refills: 0 | Status: ACTIVE | COMMUNITY
Start: 2021-02-12 | End: 1900-01-01

## 2021-02-18 ENCOUNTER — NON-APPOINTMENT (OUTPATIENT)
Age: 31
End: 2021-02-18

## 2021-02-19 ENCOUNTER — OUTPATIENT (OUTPATIENT)
Dept: OUTPATIENT SERVICES | Facility: HOSPITAL | Age: 31
LOS: 1 days | End: 2021-02-19
Payer: MEDICAID

## 2021-02-19 ENCOUNTER — APPOINTMENT (OUTPATIENT)
Dept: CT IMAGING | Facility: IMAGING CENTER | Age: 31
End: 2021-02-19
Payer: MEDICAID

## 2021-02-19 ENCOUNTER — RESULT REVIEW (OUTPATIENT)
Age: 31
End: 2021-02-19

## 2021-02-19 ENCOUNTER — NON-APPOINTMENT (OUTPATIENT)
Age: 31
End: 2021-02-19

## 2021-02-19 DIAGNOSIS — Z98.890 OTHER SPECIFIED POSTPROCEDURAL STATES: Chronic | ICD-10-CM

## 2021-02-19 DIAGNOSIS — R09.81 NASAL CONGESTION: ICD-10-CM

## 2021-02-19 DIAGNOSIS — Z98.89 OTHER SPECIFIED POSTPROCEDURAL STATES: Chronic | ICD-10-CM

## 2021-02-19 DIAGNOSIS — O14.90 UNSPECIFIED PRE-ECLAMPSIA, UNSPECIFIED TRIMESTER: Chronic | ICD-10-CM

## 2021-02-19 DIAGNOSIS — Q21.1 ATRIAL SEPTAL DEFECT: Chronic | ICD-10-CM

## 2021-02-19 DIAGNOSIS — K46.9 UNSPECIFIED ABDOMINAL HERNIA WITHOUT OBSTRUCTION OR GANGRENE: Chronic | ICD-10-CM

## 2021-02-19 PROCEDURE — 70486 CT MAXILLOFACIAL W/O DYE: CPT | Mod: 26

## 2021-02-19 PROCEDURE — 70486 CT MAXILLOFACIAL W/O DYE: CPT

## 2021-02-22 NOTE — END OF VISIT
[] : Resident [FreeTextEntry3] : I personally saw and examined TALIA RAMOS in detail.  I spoke to FAVIOLA Barragan regarding the assessment and plan of care. I performed the procedures and relevant physical exam.  I have reviewed the above assessment and plan of care and I agree.  I have made changes to the body of the note wherever necessary and appropriate.

## 2021-02-22 NOTE — CONSULT LETTER
[Dear  ___] : Dear  [unfilled], [Consult Letter:] : I had the pleasure of evaluating your patient, [unfilled]. [Please see my note below.] : Please see my note below. [Consult Closing:] : Thank you very much for allowing me to participate in the care of this patient.  If you have any questions, please do not hesitate to contact me. [Sincerely,] : Sincerely, [FreeTextEntry3] : Sincerely, \par \par Cheyenne Stark MD\par Otolaryngology- Facial Plastics \par [address]\[place] [address]\par (P) - [phone]\par (F) - [phone]\par \par

## 2021-02-22 NOTE — PROCEDURE
[FreeTextEntry6] : reason for exam: anterior rhinoscopy insufficient for symptom evaluation \par \par Fiberoptic nasal endoscopy was performed.  R/b/a of procedure was explained to the patient and they agreed to proceed with procedure.  Nasal cavities were treated with afrin and lidocaine prior to nasal endoscopy to make the patient more comfortable.  Significant factors noted: LEFT MIDDLE TURBINATE ERYTHEMA Otherwise normal mucosa, normal b/l inferior, middle and superior turbinates, inferior, middle and superior meati and sphenoethmoidal recess.  No nasal polyps.  Septum deviated to the right \par \par scope #: 32

## 2021-02-22 NOTE — REASON FOR VISIT
[Initial Evaluation] : an initial evaluation for [FreeTextEntry2] : sinus pressure, sinus headaches, nasal congestion, foul odor

## 2021-02-22 NOTE — HISTORY OF PRESENT ILLNESS
[de-identified] : Patient is a 30 year old female who presents with nasal congestion, sinus pressure, sinus headaches, and foul odor. She said in December she had no symptoms of nasal congestion but had a foul odor from her left nare. When she occluded the right nostril and blew out of her left nostril, her partner was able to smell the foul odor. Patient was pregnant at that time and said she had bad morning sickness. In January she had an episode of congestion, rhinorrhea, post nasal drip, and excessive mucus. She saw an ENT who prescribed oral ciprofloxacin 500mg BID for 10 days, Ciprofloxacin and fungal cream to be used in [facility] sinus rinse to be used indefinitely. She reports mild relief in symptoms. The nasal congestion has subsided\par Still with green thick PND which she is able to spit out daily.  + foul odor remains

## 2021-02-23 ENCOUNTER — NON-APPOINTMENT (OUTPATIENT)
Age: 31
End: 2021-02-23

## 2021-02-23 RX ORDER — FLUTICASONE PROPIONATE 50 UG/1
50 SPRAY, METERED NASAL TWICE DAILY
Qty: 1 | Refills: 2 | Status: ACTIVE | COMMUNITY
Start: 2021-02-23 | End: 1900-01-01

## 2021-02-23 RX ORDER — METHYLPREDNISOLONE 4 MG/1
4 TABLET ORAL
Qty: 1 | Refills: 0 | Status: ACTIVE | COMMUNITY
Start: 2021-02-23 | End: 1900-01-01

## 2021-02-25 ENCOUNTER — RESULT REVIEW (OUTPATIENT)
Age: 31
End: 2021-02-25

## 2021-02-25 ENCOUNTER — OUTPATIENT (OUTPATIENT)
Dept: OUTPATIENT SERVICES | Facility: HOSPITAL | Age: 31
LOS: 1 days | End: 2021-02-25

## 2021-02-25 ENCOUNTER — APPOINTMENT (OUTPATIENT)
Dept: CARDIOLOGY | Facility: CLINIC | Age: 31
End: 2021-02-25
Payer: MEDICAID

## 2021-02-25 ENCOUNTER — APPOINTMENT (OUTPATIENT)
Dept: OBGYN | Facility: HOSPITAL | Age: 31
End: 2021-02-25

## 2021-02-25 VITALS
SYSTOLIC BLOOD PRESSURE: 120 MMHG | DIASTOLIC BLOOD PRESSURE: 69 MMHG | TEMPERATURE: 97.8 F | HEIGHT: 67 IN | WEIGHT: 183 LBS | BODY MASS INDEX: 28.72 KG/M2 | HEART RATE: 84 BPM

## 2021-02-25 VITALS — HEART RATE: 58 BPM

## 2021-02-25 DIAGNOSIS — Z98.890 OTHER SPECIFIED POSTPROCEDURAL STATES: Chronic | ICD-10-CM

## 2021-02-25 DIAGNOSIS — K46.9 UNSPECIFIED ABDOMINAL HERNIA WITHOUT OBSTRUCTION OR GANGRENE: Chronic | ICD-10-CM

## 2021-02-25 DIAGNOSIS — Z98.89 OTHER SPECIFIED POSTPROCEDURAL STATES: Chronic | ICD-10-CM

## 2021-02-25 DIAGNOSIS — Q21.1 ATRIAL SEPTAL DEFECT: Chronic | ICD-10-CM

## 2021-02-25 DIAGNOSIS — O14.90 UNSPECIFIED PRE-ECLAMPSIA, UNSPECIFIED TRIMESTER: Chronic | ICD-10-CM

## 2021-02-25 LAB
GLUCOSE 2H P MEAL BLD-MCNC: 57 MG/DL — LOW (ref 65–115)
GLUCOSE P FAST SERPL-MCNC: 77 MG/DL — SIGNIFICANT CHANGE UP (ref 70–99)

## 2021-02-25 PROCEDURE — 99072 ADDL SUPL MATRL&STAF TM PHE: CPT

## 2021-02-25 PROCEDURE — 93000 ELECTROCARDIOGRAM COMPLETE: CPT

## 2021-02-25 PROCEDURE — 99213 OFFICE O/P EST LOW 20 MIN: CPT

## 2021-02-25 NOTE — PHYSICAL EXAM
[General Appearance - Well Developed] : well developed [Normal Appearance] : normal appearance [Well Groomed] : well groomed [General Appearance - Well Nourished] : well nourished [No Deformities] : no deformities [General Appearance - In No Acute Distress] : no acute distress [Normal Conjunctiva] : the conjunctiva exhibited no abnormalities [Eyelids - No Xanthelasma] : the eyelids demonstrated no xanthelasmas [Normal Oral Mucosa] : normal oral mucosa [No Oral Pallor] : no oral pallor [No Oral Cyanosis] : no oral cyanosis [Normal Jugular Venous A Waves Present] : normal jugular venous A waves present [Normal Jugular Venous V Waves Present] : normal jugular venous V waves present [No Jugular Venous Kiser A Waves] : no jugular venous kiser A waves [Respiration, Rhythm And Depth] : normal respiratory rhythm and effort [Exaggerated Use Of Accessory Muscles For Inspiration] : no accessory muscle use [Auscultation Breath Sounds / Voice Sounds] : lungs were clear to auscultation bilaterally [Heart Rate And Rhythm] : heart rate and rhythm were normal [Heart Sounds] : normal S1 and S2 [Murmurs] : no murmurs present [Abdomen Soft] : soft [Abdomen Tenderness] : non-tender [] : no hepato-splenomegaly [Abdomen Mass (___ Cm)] : no abdominal mass palpated [Abnormal Walk] : normal gait [Gait - Sufficient For Exercise Testing] : the gait was sufficient for exercise testing

## 2021-02-25 NOTE — HISTORY OF PRESENT ILLNESS
[Postpartum Follow Up] : postpartum follow up [Complications:___] : complications include: [unfilled] [Delivery Date: ___] : on [unfilled] [Repeat C/S] : delivered by  section (repeat) [Female] : Delivery History: baby girl [Wt. ___] : weighing [unfilled] [Pertussis Vaccine] : Pertussis vaccine administered [BTL] : bilateral tubal ligation completed [Clean/Dry/Intact] : clean, dry and intact [Back to Normal] : is back to normal in size [None] : no vaginal bleeding [Normal] : the vagina was normal [Rhogam] : Rhogam was not administered [Rubella Vaccine] : Rubella vaccine was not administered [Breastfeeding] : not currently nursing [Resumed Menses] : has not resumed her menses [Resumed [place]] : has not resumed intercourse [Intended Contraception] : the patient does not intended to use contraception postpartum [Erythema] : not erythematous [Swelling] : not swollen [Dehiscence] : not dehisced [de-identified] : Pt and baby doing well. Pt plans on using natural family planning for contraception. Pt inquired about risk of 4th c/s, pt counseled on risk. Advised that if she was to become pregnant again she should wait at least 18 months. Encouarged reliable method of contraception, which pt will consider. Filomena EC given to patient. BPs have been WNL, usually <120/70 therefore pt has not been taking procardia. Following up with cardiology today. 2 hr GTT today.

## 2021-03-04 ENCOUNTER — EMERGENCY (EMERGENCY)
Facility: HOSPITAL | Age: 31
LOS: 1 days | Discharge: LEFT BEFORE DISCHARGE | End: 2021-03-04
Admitting: EMERGENCY MEDICINE
Payer: MEDICAID

## 2021-03-04 VITALS
OXYGEN SATURATION: 100 % | HEIGHT: 67 IN | TEMPERATURE: 98 F | RESPIRATION RATE: 18 BRPM | SYSTOLIC BLOOD PRESSURE: 132 MMHG | HEART RATE: 59 BPM | DIASTOLIC BLOOD PRESSURE: 79 MMHG

## 2021-03-04 DIAGNOSIS — Z98.890 OTHER SPECIFIED POSTPROCEDURAL STATES: Chronic | ICD-10-CM

## 2021-03-04 DIAGNOSIS — Z98.89 OTHER SPECIFIED POSTPROCEDURAL STATES: Chronic | ICD-10-CM

## 2021-03-04 DIAGNOSIS — K46.9 UNSPECIFIED ABDOMINAL HERNIA WITHOUT OBSTRUCTION OR GANGRENE: Chronic | ICD-10-CM

## 2021-03-04 DIAGNOSIS — Q21.1 ATRIAL SEPTAL DEFECT: Chronic | ICD-10-CM

## 2021-03-04 DIAGNOSIS — O14.90 UNSPECIFIED PRE-ECLAMPSIA, UNSPECIFIED TRIMESTER: Chronic | ICD-10-CM

## 2021-03-04 PROCEDURE — 93010 ELECTROCARDIOGRAM REPORT: CPT

## 2021-03-04 PROCEDURE — L9991: CPT

## 2021-03-04 NOTE — ED ADULT TRIAGE NOTE - CHIEF COMPLAINT QUOTE
pt c/o having sharp epigastric pain radiating  to back starting 3 hours ago.pt appears very uncomfortable. denies n/vd/,sob,dizziness. pmh ASD.

## 2021-03-05 RX ORDER — AMOXICILLIN AND CLAVULANATE POTASSIUM 500; 125 MG/1; MG/1
500-125 TABLET, FILM COATED ORAL
Qty: 6 | Refills: 0 | Status: ACTIVE | COMMUNITY
Start: 2021-02-23 | End: 1900-01-01

## 2021-03-11 ENCOUNTER — APPOINTMENT (OUTPATIENT)
Dept: OTOLARYNGOLOGY | Facility: CLINIC | Age: 31
End: 2021-03-11

## 2021-03-11 RX ORDER — FLUCONAZOLE 150 MG/1
150 TABLET ORAL
Qty: 1 | Refills: 0 | Status: ACTIVE | COMMUNITY
Start: 2021-03-11 | End: 1900-01-01

## 2021-03-31 ENCOUNTER — APPOINTMENT (OUTPATIENT)
Dept: OTOLARYNGOLOGY | Facility: CLINIC | Age: 31
End: 2021-03-31

## 2021-04-01 ENCOUNTER — APPOINTMENT (OUTPATIENT)
Dept: OTOLARYNGOLOGY | Facility: CLINIC | Age: 31
End: 2021-04-01

## 2021-04-15 ENCOUNTER — APPOINTMENT (OUTPATIENT)
Dept: OTOLARYNGOLOGY | Facility: CLINIC | Age: 31
End: 2021-04-15
Payer: MEDICAID

## 2021-04-15 VITALS
BODY MASS INDEX: 29.82 KG/M2 | DIASTOLIC BLOOD PRESSURE: 76 MMHG | HEIGHT: 67 IN | SYSTOLIC BLOOD PRESSURE: 124 MMHG | HEART RATE: 50 BPM | OXYGEN SATURATION: 98 % | TEMPERATURE: 97.3 F | WEIGHT: 190 LBS

## 2021-04-15 DIAGNOSIS — R09.81 NASAL CONGESTION: ICD-10-CM

## 2021-04-15 DIAGNOSIS — J01.90 ACUTE SINUSITIS, UNSPECIFIED: ICD-10-CM

## 2021-04-15 DIAGNOSIS — F22 DELUSIONAL DISORDERS: ICD-10-CM

## 2021-04-15 PROCEDURE — 31231 NASAL ENDOSCOPY DX: CPT

## 2021-04-15 PROCEDURE — 99214 OFFICE O/P EST MOD 30 MIN: CPT | Mod: 25

## 2021-04-15 PROCEDURE — 99072 ADDL SUPL MATRL&STAF TM PHE: CPT

## 2021-04-15 RX ORDER — BUDESONIDE 0.5 MG/2ML
0.5 INHALANT ORAL
Qty: 1 | Refills: 3 | Status: ACTIVE | COMMUNITY
Start: 2021-04-15 | End: 1900-01-01

## 2021-04-15 NOTE — PROCEDURE
[FreeTextEntry6] : reason for exam: anterior rhinoscopy insufficient for symptom evaluation \par \par Fiberoptic nasal endoscopy was performed.  R/b/a of procedure was explained to the patient and they agreed to proceed with procedure.  Nasal cavities were treated with afrin and lidocaine prior to nasal endoscopy to make the patient more comfortable. Normal mucosa, normal b/l inferior, middle and superior turbinates, inferior, middle and superior meati and sphenoethmoidal recess.  No nasal polyps.  Septum deviated to the right \par \par scope #: G9

## 2021-04-15 NOTE — ASSESSMENT
[FreeTextEntry1] : Patient is a 30 year old female who presents with sinus pressure, sinus pain, nasal congestion, and foul odor in her left nare. Physical examination WNL. Nasal endoscopy essentially wnl.  Much improved with initial tx.  Appears to have taken a step backwards when she stopped sinus rinses.  Will add budesonide to her rinses to try to avoid PO steroids.\par \par \par Plan\par - rx budesonide \par - Discussed results of CT scan with patient, informed her there was complete opacification of left maxillary sinus\par - c/w [facility] sinus rinse with addition of budesonide\par - Discussed r/b/a of FESS vs sinus balloon \par - fup 1 month to re-eval.  May refer to Dr. Deluna for balloon. \par

## 2021-04-15 NOTE — REASON FOR VISIT
[Subsequent Evaluation] : a subsequent evaluation for [FreeTextEntry2] : sinus pressure, sinus headaches, nasal congestion, foul odor

## 2021-04-15 NOTE — HISTORY OF PRESENT ILLNESS
[de-identified] : Patient is a 30 year old female who presents with nasal congestion, sinus pressure, sinus headaches, and foul odor. She said in December she had no symptoms of nasal congestion but had a foul odor from her left nare. Treated with 10 days of cipro then seen by me.  given augmentin and CT scan was obtained which showed complete opacificaiton of left maxillary/anterior ethmoid.  [FreeTextEntry1] : Patient completed course of Augmentin and she has been using the sinus rinses intermittently. Her symptoms resolved during the month of March and returned the first week of April. Patient reports she has clear phlegm, occasionally yellow, and does not feel as congested at the moment as she did last week. The symptoms are more prominent on the left side than the right.  Of note, she had a sinus balloon years ago for a similar issue which resolved the situation.

## 2021-04-30 NOTE — HISTORY OF PRESENT ILLNESS
[FreeTextEntry1] : Delivered 1/15/21\par Was readmitted for PEC and given Mg\par Off Nifedipine\par Feels well. Repeat BP here was 122/82

## 2021-04-30 NOTE — ADDENDUM
[FreeTextEntry1] : Patient last seen 2/25/21 for history of PEC\par She had normal echocardiogram in August 2020 with normal LV and RV function\par She is off medications and BP is now normal\par There is no cardiac contraindication for gluteal augmentation, may proceed with surgery without further cardiac workup.

## 2021-05-19 ENCOUNTER — APPOINTMENT (OUTPATIENT)
Dept: OTOLARYNGOLOGY | Facility: CLINIC | Age: 31
End: 2021-05-19

## 2021-07-07 ENCOUNTER — APPOINTMENT (OUTPATIENT)
Dept: ORTHOPEDIC SURGERY | Facility: CLINIC | Age: 31
End: 2021-07-07
Payer: MEDICAID

## 2021-07-07 ENCOUNTER — NON-APPOINTMENT (OUTPATIENT)
Age: 31
End: 2021-07-07

## 2021-07-07 VITALS
BODY MASS INDEX: 29.82 KG/M2 | HEIGHT: 67 IN | DIASTOLIC BLOOD PRESSURE: 80 MMHG | HEART RATE: 74 BPM | SYSTOLIC BLOOD PRESSURE: 112 MMHG | WEIGHT: 190 LBS

## 2021-07-07 DIAGNOSIS — G89.29 LUMBAGO WITH SCIATICA, UNSPECIFIED SIDE: ICD-10-CM

## 2021-07-07 DIAGNOSIS — Z87.898 PERSONAL HISTORY OF OTHER SPECIFIED CONDITIONS: ICD-10-CM

## 2021-07-07 DIAGNOSIS — G89.29 LOW BACK PAIN: ICD-10-CM

## 2021-07-07 DIAGNOSIS — M54.40 LUMBAGO WITH SCIATICA, UNSPECIFIED SIDE: ICD-10-CM

## 2021-07-07 DIAGNOSIS — M54.5 LOW BACK PAIN: ICD-10-CM

## 2021-07-07 DIAGNOSIS — M51.36 OTHER INTERVERTEBRAL DISC DEGENERATION, LUMBAR REGION: ICD-10-CM

## 2021-07-07 PROCEDURE — 99204 OFFICE O/P NEW MOD 45 MIN: CPT

## 2021-07-07 PROCEDURE — 99214 OFFICE O/P EST MOD 30 MIN: CPT

## 2021-07-07 PROCEDURE — 72100 X-RAY EXAM L-S SPINE 2/3 VWS: CPT

## 2021-07-07 RX ORDER — IBUPROFEN 800 MG/1
800 TABLET, FILM COATED ORAL
Qty: 90 | Refills: 0 | Status: ACTIVE | COMMUNITY
Start: 2021-07-07 | End: 1900-01-01

## 2021-07-07 RX ORDER — CYCLOBENZAPRINE HYDROCHLORIDE 10 MG/1
10 TABLET, FILM COATED ORAL 3 TIMES DAILY
Qty: 30 | Refills: 0 | Status: ACTIVE | COMMUNITY
Start: 2021-07-07 | End: 1900-01-01

## 2021-07-07 RX ORDER — OMEPRAZOLE 20 MG/1
20 CAPSULE, DELAYED RELEASE ORAL DAILY
Qty: 30 | Refills: 0 | Status: ACTIVE | COMMUNITY
Start: 2021-07-07 | End: 1900-01-01

## 2021-07-08 PROBLEM — Z87.898 HISTORY OF HEARTBURN: Status: RESOLVED | Noted: 2021-07-08 | Resolved: 2021-07-08

## 2021-07-08 NOTE — HISTORY OF PRESENT ILLNESS
[de-identified] : This 30-year-old woman has a 7-year history of intermittent lower back pain that can occur at twice a year and is occasionally associated with leg pain.  The symptoms normally resolve within a week with rest.  6 days ago she had the onset of mild lower back pain which became acutely worse 3 days ago.  She has not had associated leg pain.  The pain is no worse coughing, sneezing or forcing to move her bowels.  It is no worse sitting but it is worse standing, walking and with change of position.  Treatment has been ice or heat and ibuprofen but only 800 mg once a day.\par \par She has had 3 prior C-sections, bilateral inguinal hernia surgery at age 7, ASD repair and an abdominoplasty.  She gets gestational diabetes with her pregnancies.  She has a history of heartburn and she has a hiatal hernia.  She also has elevated transaminases from a fatty liver according to her history. [Pain Location] : pain [Worsening] : worsening [10] : a maximum pain level of 10/10 [Bending] : worsened by bending [Lifting] : worsened by lifting [Prolonged Sitting] : not worsened by prolonged sitting [Prolonged Standing] : worsened by prolonged standing [Sitting] : not worsened by sitting [Standing] : worsened by standing [Walking] : worsened by walking

## 2021-07-08 NOTE — PHYSICAL EXAM
[de-identified] : She is fully alert and oriented with a normal mood and affect.  She is in no acute distress as I take the history.  She is overweight.  She moves with great difficulty secondary to lower back pain.  She has a marked list to the right and range of motion of the spine is very limited by pain.  There are no cutaneous abnormalities or palpable bony defects of the spine.  There is no evidence of shortness of breath or respiratory distress.  There is no sciatic or trochanteric tenderness.  Her lower extremity neurological examination revealed 1-2+ symmetrical reflexes.  Motor power is normal to manual testing in all lower extremity groups and sensation is normal to light touch in all dermatomes.  Straight leg raising in the sitting position is limited to 60 degrees bilaterally by lower back pain.  Her hips and her knees have a full and painless range of motion with normal stability.  Vascular examination shows no evidence of varicosities and there is no lymphedema.  There are no cutaneous abnormalities of the upper extremities or of the lower extremities.  Her upper extremities are normal to inspection and her elbows have a full and painless range of motion with normal motor power and normal stability. [de-identified] : AP and lateral x-rays of the lumbar spine reveal a marked list.  There are no destructive changes.  Sagittal alignment is normal.

## 2021-07-15 ENCOUNTER — NON-APPOINTMENT (OUTPATIENT)
Age: 31
End: 2021-07-15

## 2021-07-27 ENCOUNTER — RX RENEWAL (OUTPATIENT)
Age: 31
End: 2021-07-27

## 2021-08-04 ENCOUNTER — APPOINTMENT (OUTPATIENT)
Dept: ORTHOPEDIC SURGERY | Facility: CLINIC | Age: 31
End: 2021-08-04

## 2021-08-31 NOTE — OB PROVIDER H&P - NSPREVIOUSINDUCEDTERMINATIONS_OBGYN_ALL_OB_NU
Show Applicator Variable?: Yes Render Note In Bullet Format When Appropriate: No Post-Care Instructions: I reviewed with the patient in detail post-care instructions. Patient is to wear sunprotection, and avoid picking at any of the treated lesions. Pt may apply Vaseline to crusted or scabbing areas. Duration Of Freeze Thaw-Cycle (Seconds): 0 Detail Level: Detailed Consent: The patient's consent was obtained including but not limited to risks of crusting, scabbing, blistering, scarring, darker or lighter pigmentary change, recurrence, incomplete removal and infection. 1

## 2021-09-15 NOTE — OB RN PATIENT PROFILE - PARENTS VERBALIZED UNDERSTANDING OF THE SAFE SKIN TO SKIN POSITIONING OF THE NEWBORN.
Biopsy site to left outer breast clear with Dermabond dry and intact. No firmness or swelling noted at or around biopsy site. Noted a 2.0 cm round area of bruising on inner side of breast. Denies pain. Ice pack with protective covering applied to biopsy site. Patient awake, alert, & oriented x4. Discharge instructions discussed with understanding voiced by patient. Copies provided to patient. No distress noted. To Discharge Exit via wheelchair per this nurse. To home via private vehicle driven by her .  
Call from  to inform that patient has arrived for procedure.    
Creatinine 0.7.   GFR 95.  
Dr. Rockwell in with patient consented and site marked. All patient questions addressed.  
VSS. Denies pain. Medical/surgical history and medications reviewed. No distress noted. Procedural education completed with patient's questions addressed.   
Statement Selected

## 2022-09-15 NOTE — OB RN PATIENT PROFILE - LIVING CHILDREN, OB PROFILE
Subjective:   37 y.o. female for Well Woman Check. Her gyne and breast care is done elsewhere by her Ob-Gyne physician. Patient Active Problem List    Diagnosis Date Noted    Obesity, morbid ([address] 01/23/2018    Depression 07/12/2017    Sleep apnea 07/12/2017    Chronic calculous cholecystitis 07/12/2017    Anxiety 06/09/2015    Hypercholesteremia 08/19/2014    HTN (hypertension) 08/19/2014     Current Outpatient Medications   Medication Sig Dispense Refill    cetirizine (ZYRTEC) 10 mg tablet Take 1 Tablet by mouth daily. 90 Tablet 3    ARIPiprazole (ABILIFY) 10 mg tablet TAKE 1 TABLET EVERY DAY 90 Tablet 1    pantoprazole (PROTONIX) 20 mg tablet TAKE 1 TABLET EVERY DAY 90 Tablet 1    escitalopram oxalate (LEXAPRO) 20 mg tablet TAKE 1 TABLET EVERY DAY 90 Tablet 1    losartan (COZAAR) 100 mg tablet TAKE 1 TABLET EVERY DAY 90 Tablet 1    pravastatin (PRAVACHOL) 40 mg tablet TAKE 1 TABLET EVERY DAY  BEFORE  BEDTIME 90 Tablet 1    triamcinolone (ARISTOCORT) 0.5 % topical cream Apply  to affected area two (2) times a day. use thin layer for no more than 2 weeks 60 g 0    dicyclomine (BENTYL) 10 mg capsule Take 1 Cap by mouth Before breakfast, lunch, and dinner. 270 Cap 1    mometasone (NASONEX) 50 mcg/actuation nasal spray instill 2 sprays into each nostril once daily 1 Container 6    cholecalciferol (VITAMIN D3) 2,000 unit cap capsule Take  by mouth two (2) times a day.  diphenhydrAMINE (BENADRYL ALLERGY) 25 mg tablet Take 1 Tab by mouth every six (6) hours as needed for Sleep. 12 Tab 0    diphenhydrAMINE-zinc acetate 2%-0.1% (BENADRYL EXTRA STRENGTH) 2-0.1 % topical cream Apply  to affected area two (2) times daily as needed for Itching.  30 g 0     Family History   Problem Relation Age of Onset    Hypertension Mother     Elevated Lipids Mother     Hypertension Father      Social History     Tobacco Use    Smoking status: Former    Smokeless tobacco: Former    Tobacco comments:     only smokes on the weekends    Substance Use Topics    Alcohol use: Yes     Alcohol/week: 20.0 standard drinks     Types: 24 Cans of beer per week             ROS: Feeling generally well. No TIA's or unusual headaches, no dysphagia. No prolonged cough. No dyspnea or chest pain on exertion. No abdominal pain, change in bowel habits, black or bloody stools. No urinary tract symptoms. No new or unusual musculoskeletal symptoms. Specific concerns today: did have thyroid US done and showed nodules milton, needs labs done as well; never called ENT for consult. Objective: The patient appears well, alert, oriented x 3, in no distress. Visit Vitals  /84 (BP 1 Location: Right arm, BP Patient Position: Sitting)   Pulse 60   Temp 98.2 °F (36.8 °C) (Oral)   Resp 16   Ht 5' 4\" (1.626 m)   Wt 234 lb (106.1 kg)   SpO2 98%   BMI 40.17 kg/m²     ENT normal.  Neck supple. No adenopathy or thyromegaly. AVERY. Lungs are clear, good air entry, no wheezes, rhonchi or rales. S1 and S2 normal, no murmurs, regular rate and rhythm. Abdomen soft without tenderness, guarding, mass or organomegaly. Extremities show no edema, normal peripheral pulses. Neurological is normal, no focal findings. Breast and Pelvic exams are deferred. Assessment/Plan:   Well Woman  lose weight, increase physical activity, follow low fat diet, follow low salt diet, routine labs ordered  Encounter Diagnoses   Name Primary?     Well adult exam Yes    Essential hypertension     Thyroid enlargement     Vitamin D deficiency     Class 3 severe obesity with serious comorbidity and body mass index (BMI) of 40.0 to 44.9 in adult, unspecified obesity type ([address]      Orders Placed This Encounter    CBC WITH AUTOMATED DIFF    METABOLIC PANEL, COMPREHENSIVE    TSH 3RD GENERATION    LIPID PANEL    HEMOGLOBIN A1C WITH EAG    VITAMIN D, 25 HYDROXY    [facility]     Labs updated today  Referral given to ENT  Follow up prn  I have discussed the diagnosis with the patient and the intended plan as seen in the above orders. The patient has received an after-visit summary and questions were answered concerning future plans. Patient conveyed understanding of the plan at the time of the visit.     ALEXSANDER Ware, ANP  9/15/2022 2

## 2022-09-22 NOTE — DISCHARGE NOTE OB - LAUNCH MEDICATION RECONCILIATION
Pt taken to the ED late yesterday.  History from Admission H&P this morning.    <Start of quote(s) from H&P>  "Reason for Admission: Altered Mental Status  History of Present Illness:   HPI limited secondary to altered mental status, dementia    Pt is a 86F with a PMH dementia (alert and disoriented at baseline per chart), HTN, HLD, T2DM, chronic DVT, OA, restless leg syndrome, constipation, frequent UTIs, and insomnia who presents from [place] at [facility] for altered mental status. Pt was brought in yesterday afternoon due to agitation uncharacteristic of her baseline (alert and disoriented, but following simple commands).   Spoke to patient's son Mr. Hill denies recent viral illnesses, or infection but did states his mom has decreased oral intake for the last several days and decreased appetite. Denies fevers, chills or nausea or vomiting.     In ED pt was administered 2.5mg haldol and 2.5mg olanzapine for agitation. Labs revealed Na of 152 and Creatinine of 1.32; pt was started on D5 1/2NS. CTH negative for acute infarct, UA negative, and CXR WNL.     Review of Systems:  Unable to obtain due to: Altered mentation   . . .   Home Medications:   * Incomplete Medication History as of 22-Sep-2022 09:38 documented in Structured Notes  · 	NIFEdipine 30 mg oral tablet, extended release: Last Dose Taken:  , 1 tab(s) orally once a day  · 	simvastatin 10 mg oral tablet: Last Dose Taken:  , 1 tab(s) orally once a day (at bedtime)  · 	warfarin 2 mg oral tablet: Last Dose Taken:  , 1 tab(s) orally once a day (at bedtime)  · 	losartan 100 mg oral tablet: Last Dose Taken:  , 1 tab(s) orally once a day  · 	Vitamin C 500 mg oral tablet: Last Dose Taken:  , 1 tab(s) orally once a day  · 	multivitamin: Last Dose Taken:  , 1 tab(s) orally once a day  · 	Systane Complete Preservative Free Dry Eye Relief ophthalmic solution: Last Dose Taken:  , 1 drop(s) to each affected eye 4 times a day  · 	traZODone 50 mg oral tablet: Last Dose Taken:  , 1 tab(s) orally once a day (at bedtime)    Patient History:    Past Medical, Past Surgical, and Family History:  PAST MEDICAL HISTORY:  Arthritis OA  Constipation   Diabetes T2DM  DVT (deep venous thrombosis) chronic  Frequent UTI   HLD (hyperlipidemia)   Hypertension   Insomnia   Restless leg syndrome.     PAST SURGICAL HISTORY:  S/P knee replacement   S/P tubal ligation.    . . .    Social History:  · Substance use	Unable to obtain  · Unable to Obtain due to	Altered mentation"  <End of quote(s) from H&P>     Pt taken to the ED late yesterday.  History from Admission H&P this morning.    <Start of quote(s) from H&P>  "Reason for Admission: Altered Mental Status  History of Present Illness:   HPI limited secondary to altered mental status, dementia    Pt is a 86F with a PMH dementia (alert and disoriented at baseline per chart), HTN, HLD, T2DM, chronic DVT, OA, restless leg syndrome, constipation, frequent UTIs, and insomnia who presents from [place] at [facility] for altered mental status. Pt was brought in yesterday afternoon due to agitation uncharacteristic of her baseline (alert and disoriented, but following simple commands).   Spoke to patient's son Mr. Hill denies recent viral illnesses, or infection but did states his mom has decreased oral intake for the last several days and decreased appetite. Denies fevers, chills or nausea or vomiting.     In ED pt was administered 2.5mg haldol and 2.5mg olanzapine for agitation. Labs revealed Na of 152 and Creatinine of 1.32; pt was started on D5 1/2NS. CTH negative for acute infarct, UA negative, and CXR WNL.     Review of Systems:  Unable to obtain due to: Altered mentation   . . .   Home Medications:   * Incomplete Medication History as of 22-Sep-2022 09:38 documented in Structured Notes  · 	NIFEdipine 30 mg oral tablet, extended release: Last Dose Taken:  , 1 tab(s) orally once a day  · 	simvastatin 10 mg oral tablet: Last Dose Taken:  , 1 tab(s) orally once a day (at bedtime)  · 	warfarin 2 mg oral tablet: Last Dose Taken:  , 1 tab(s) orally once a day (at bedtime)  · 	losartan 100 mg oral tablet: Last Dose Taken:  , 1 tab(s) orally once a day  · 	Vitamin C 500 mg oral tablet: Last Dose Taken:  , 1 tab(s) orally once a day  · 	multivitamin: Last Dose Taken:  , 1 tab(s) orally once a day  · 	Systane Complete Preservative Free Dry Eye Relief ophthalmic solution: Last Dose Taken:  , 1 drop(s) to each affected eye 4 times a day  · 	traZODone 50 mg oral tablet: Last Dose Taken:  , 1 tab(s) orally once a day (at bedtime)    Patient History:    Past Medical, Past Surgical, and Family History:  PAST MEDICAL HISTORY:  Arthritis OA  Constipation   Diabetes T2DM  DVT (deep venous thrombosis) chronic  Frequent UTI   HLD (hyperlipidemia)   Hypertension   Insomnia   Restless leg syndrome.     PAST SURGICAL HISTORY:  S/P knee replacement   S/P tubal ligation.    . . .    Social History:  · Substance use	Unable to obtain  · Unable to Obtain due to	Altered mentation"  <End of quote(s) from H&P>    Na+ on admission 152    Per radiology report of non-con head CT:  "COMPARISON: 8/17/2021.    TECHNIQUE: Noncontrast axial CT head was obtained from the skull base to   vertex.    FINDINGS:  No acute intracranial hemorrhage, mass effect or midline shift.  No CT evidence of acute large vascular territory infarct.  The ventricles and cortical sulci are within normal limits forage.  Scattered hypodensities in the periventricular white matter are   nonspecific, but likely sequela of small vessel ischemic disease.    The paranasal sinuses and mastoid air cells are well aerated. The native   ocular lenses are surgically absent.  No displaced calvarial fracture.    IMPRESSION:  No acute intracranial hemorrhage or mass effect."      TO BE COMPLETED Pt taken to the ED late yesterday.  History from Admission H&P this morning.    <Start of quote(s) from H&P>  "Reason for Admission: Altered Mental Status  History of Present Illness:   HPI limited secondary to altered mental status, dementia    Pt is a 86F with a PMH dementia (alert and disoriented at baseline per chart), HTN, HLD, T2DM, chronic DVT, OA, restless leg syndrome, constipation, frequent UTIs, and insomnia who presents from [place] at [facility] for altered mental status. Pt was brought in yesterday afternoon due to agitation uncharacteristic of her baseline (alert and disoriented, but following simple commands).   Spoke to patient's son Mr. Hill denies recent viral illnesses, or infection but did states his mom has decreased oral intake for the last several days and decreased appetite. Denies fevers, chills or nausea or vomiting.     In ED pt was administered 2.5mg haldol and 2.5mg olanzapine for agitation. Labs revealed Na of 152 and Creatinine of 1.32; pt was started on D5 1/2NS. CTH negative for acute infarct, UA negative, and CXR WNL.     Review of Systems:  Unable to obtain due to: Altered mentation   . . .   Home Medications:   * Incomplete Medication History as of 22-Sep-2022 09:38 documented in Structured Notes  · 	NIFEdipine 30 mg oral tablet, extended release: Last Dose Taken:  , 1 tab(s) orally once a day  · 	simvastatin 10 mg oral tablet: Last Dose Taken:  , 1 tab(s) orally once a day (at bedtime)  · 	warfarin 2 mg oral tablet: Last Dose Taken:  , 1 tab(s) orally once a day (at bedtime)  · 	losartan 100 mg oral tablet: Last Dose Taken:  , 1 tab(s) orally once a day  · 	Vitamin C 500 mg oral tablet: Last Dose Taken:  , 1 tab(s) orally once a day  · 	multivitamin: Last Dose Taken:  , 1 tab(s) orally once a day  · 	Systane Complete Preservative Free Dry Eye Relief ophthalmic solution: Last Dose Taken:  , 1 drop(s) to each affected eye 4 times a day  · 	traZODone 50 mg oral tablet: Last Dose Taken:  , 1 tab(s) orally once a day (at bedtime)    Patient History:    Past Medical, Past Surgical, and Family History:  PAST MEDICAL HISTORY:  Arthritis OA  Constipation   Diabetes T2DM  DVT (deep venous thrombosis) chronic  Frequent UTI   HLD (hyperlipidemia)   Hypertension   Insomnia   Restless leg syndrome.     PAST SURGICAL HISTORY:  S/P knee replacement   S/P tubal ligation.    . . .    Social History:  · Substance use	Unable to obtain  · Unable to Obtain due to	Altered mentation"  <End of quote(s) from H&P>    Na+ on admission 152  TSH  1.76  Albumin  3.1  AST/ALT  60/36  Vit D in Aug 2021 31.5, borderline low      Per radiology report of non-con head CT:  "COMPARISON: 8/17/2021.    TECHNIQUE: Noncontrast axial CT head was obtained from the skull base to   vertex.    FINDINGS:  No acute intracranial hemorrhage, mass effect or midline shift.  No CT evidence of acute large vascular territory infarct.  The ventricles and cortical sulci are within normal limits forage.  Scattered hypodensities in the periventricular white matter are   nonspecific, but likely sequela of small vessel ischemic disease.    The paranasal sinuses and mastoid air cells are well aerated. The native   ocular lenses are surgically absent.  No displaced calvarial fracture.    IMPRESSION:  No acute intracranial hemorrhage or mass effect."      TO BE COMPLETED Pt taken to the ED late yesterday.  History from Admission H&P this morning.    <Start of quote(s) from H&P>  "Reason for Admission: Altered Mental Status  History of Present Illness:   HPI limited secondary to altered mental status, dementia    Pt is a 86F with a PMH dementia (alert and disoriented at baseline per chart), HTN, HLD, T2DM, chronic DVT, OA, restless leg syndrome, constipation, frequent UTIs, and insomnia who presents from [place] at [facility] for altered mental status. Pt was brought in yesterday afternoon due to agitation uncharacteristic of her baseline (alert and disoriented, but following simple commands).   Spoke to patient's son Mr. Hill denies recent viral illnesses, or infection but did states his mom has decreased oral intake for the last several days and decreased appetite. Denies fevers, chills or nausea or vomiting.     In ED pt was administered 2.5mg haldol and 2.5mg olanzapine for agitation. Labs revealed Na of 152 and Creatinine of 1.32; pt was started on D5 1/2NS. CTH negative for acute infarct, UA negative, and CXR WNL.     Review of Systems:  Unable to obtain due to: Altered mentation   . . .   Home Medications:   * Incomplete Medication History as of 22-Sep-2022 09:38 documented in Structured Notes  · 	NIFEdipine 30 mg oral tablet, extended release: Last Dose Taken:  , 1 tab(s) orally once a day  · 	simvastatin 10 mg oral tablet: Last Dose Taken:  , 1 tab(s) orally once a day (at bedtime)  · 	warfarin 2 mg oral tablet: Last Dose Taken:  , 1 tab(s) orally once a day (at bedtime)  · 	losartan 100 mg oral tablet: Last Dose Taken:  , 1 tab(s) orally once a day  · 	Vitamin C 500 mg oral tablet: Last Dose Taken:  , 1 tab(s) orally once a day  · 	multivitamin: Last Dose Taken:  , 1 tab(s) orally once a day  · 	Systane Complete Preservative Free Dry Eye Relief ophthalmic solution: Last Dose Taken:  , 1 drop(s) to each affected eye 4 times a day  · 	traZODone 50 mg oral tablet: Last Dose Taken:  , 1 tab(s) orally once a day (at bedtime)    Patient History:    Past Medical, Past Surgical, and Family History:  PAST MEDICAL HISTORY:  Arthritis OA  Constipation   Diabetes T2DM  DVT (deep venous thrombosis) chronic  Frequent UTI   HLD (hyperlipidemia)   Hypertension   Insomnia   Restless leg syndrome.     PAST SURGICAL HISTORY:  S/P knee replacement   S/P tubal ligation.    . . .    Social History:  · Substance use	Unable to obtain  · Unable to Obtain due to	Altered mentation"  <End of quote(s) from H&P>    Na+ on admission 152  TSH  1.76  Albumin  3.1  AST/ALT  60/36  Vit D in Aug 2021 31.5, borderline low      Per radiology report of non-con head CT:  "COMPARISON: 8/17/2021.    TECHNIQUE: Noncontrast axial CT head was obtained from the skull base to   vertex.    FINDINGS:  No acute intracranial hemorrhage, mass effect or midline shift.  No CT evidence of acute large vascular territory infarct.  The ventricles and cortical sulci are within normal limits forage.  Scattered hypodensities in the periventricular white matter are   nonspecific, but likely sequela of small vessel ischemic disease.    The paranasal sinuses and mastoid air cells are well aerated. The native   ocular lenses are surgically absent.  No displaced calvarial fracture.    IMPRESSION:  No acute intracranial hemorrhage or mass effect."        EXAMINATION    Found asleep in L lateral recumbent position on [facility].  Reluctantly awakens to voice and mild stimulation.  Ignored examiner for ~2 minutes, not making a sound let alone speaking.   Then suddenly engaged in conversation.  Responded that she was89 years old.  When asked what year she was born she responded "When were YOU born?"  Pupils <3mm, appear to be minimally reactive (closes eyelids when flashlight shone).  When asked what kind of work she used to do she answers that she doesn't work anymore.  During attempted repositioning so that examination could be carried out more thoroughly she asks "are you going to kill me?"  She asks or comments in an irritated voice several other times about being killed.  When asked if she walks she answers no.  Speaks  in short phrases; no grossly evident expressive or receptive language disturbance.      Elbows held flexed >90 degrees; they can be at least partially passively extended before eliciting discomfort; tonus is moderately elevated.  She has partial range of voluntary movement in the UEs; she offered a moderate degree of resistance on testing triceps strength.  The LEs are held straightened at the hips and knees, with extremely high adductor tonus at the hips, extensor tonus at the knees, and with feet firmly plantar flexed. Less than 30 degrees of passive extension is possible, against resistance, at either hip.  Minimal forced passive movement possible at the knees and ankles.       Reflex                           Right    Left   Comment    Biceps                           1?        1?  Triceps                          0?        0?  Patellar                            Too rigid  Gastroc                         0?       0?  Plantar                       mute   mute   <<-----Click here for Discharge Medication Review

## 2022-11-08 NOTE — OB PROVIDER H&P - NS_SONONOTE_OBGYN_ALL_OB_FT
The patient was brought in by EMS for alcohol intoxication. His mother found him this morning surrounded with bottles of alcohol. The patient was alert but with a BS of 50 mg/dl. EMS gave 125 ml of D10, his BS went up to 130 mg/dl. The patient denies any complaint, except for \" I have been drinking too much alcohol for days.\"  
see ATU report

## 2022-11-22 ENCOUNTER — APPOINTMENT (OUTPATIENT)
Dept: OBGYN | Facility: HOSPITAL | Age: 32
End: 2022-11-22

## 2022-11-22 ENCOUNTER — OUTPATIENT (OUTPATIENT)
Dept: OUTPATIENT SERVICES | Facility: HOSPITAL | Age: 32
LOS: 1 days | End: 2022-11-22

## 2022-11-22 ENCOUNTER — RESULT CHARGE (OUTPATIENT)
Age: 32
End: 2022-11-22

## 2022-11-22 DIAGNOSIS — Z98.89 OTHER SPECIFIED POSTPROCEDURAL STATES: Chronic | ICD-10-CM

## 2022-11-22 DIAGNOSIS — O14.90 UNSPECIFIED PRE-ECLAMPSIA, UNSPECIFIED TRIMESTER: Chronic | ICD-10-CM

## 2022-11-22 DIAGNOSIS — Q21.1 ATRIAL SEPTAL DEFECT: Chronic | ICD-10-CM

## 2022-11-22 DIAGNOSIS — Z98.890 OTHER SPECIFIED POSTPROCEDURAL STATES: Chronic | ICD-10-CM

## 2022-11-22 DIAGNOSIS — K46.9 UNSPECIFIED ABDOMINAL HERNIA WITHOUT OBSTRUCTION OR GANGRENE: Chronic | ICD-10-CM

## 2022-11-23 LAB
HCG UR QL: POSITIVE
QUALITY CONTROL: YES

## 2022-11-29 DIAGNOSIS — Z32.00 ENCOUNTER FOR PREGNANCY TEST, RESULT UNKNOWN: ICD-10-CM

## 2023-01-19 NOTE — PATIENT PROFILE ADULT - PATIENT REPRESENTATIVE: ( YOU CAN CHOOSE ANY PERSON THAT CAN ASSIST YOU WITH YOUR HEALTH CARE PREFERENCES, DOES NOT HAVE TO BE A SPOUSE, IMMEDIATE FAMILY OR SIGNIFICANT OTHER/PARTNER)
[employer]: #[id number] and [id number]  Hx gathered from patient and daughter at bedside. Patient is a 76-year-old male history of CHF, hypertension, hyperlipidemia, CAD, CKD, CVA 8 years ago presenting for evaluation of lightheadedness, shortness of breath and vomiting.  Per daughter, patient was diagnosed with COVID 2 days ago and started on Paxlovid, first dose was yesterday.  She notes that he has lightheadedness and shortness of breath for the last 2 days.  Today he had 3 episodes of nonbloody nonbilious vomiting.  No fevers, chills, chest pain, abdominal pain.
declines

## 2023-05-30 NOTE — DISCUSSION/SUMMARY
[FreeTextEntry1] : 30 year old woman sp delivery on 1/15/21 and postpartum PEC\par Off meds\par Watch salt and diet\par FU as needed
show

## 2023-09-02 ENCOUNTER — EMERGENCY (EMERGENCY)
Facility: HOSPITAL | Age: 33
LOS: 1 days | Discharge: HOME | End: 2023-09-02
Attending: EMERGENCY MEDICINE | Admitting: EMERGENCY MEDICINE
Payer: MEDICAID

## 2023-09-02 VITALS
SYSTOLIC BLOOD PRESSURE: 145 MMHG | HEART RATE: 89 BPM | OXYGEN SATURATION: 99 % | RESPIRATION RATE: 18 BRPM | TEMPERATURE: 98 F | DIASTOLIC BLOOD PRESSURE: 107 MMHG

## 2023-09-02 DIAGNOSIS — Z98.89 OTHER SPECIFIED POSTPROCEDURAL STATES: Chronic | ICD-10-CM

## 2023-09-02 DIAGNOSIS — K46.9 UNSPECIFIED ABDOMINAL HERNIA WITHOUT OBSTRUCTION OR GANGRENE: Chronic | ICD-10-CM

## 2023-09-02 DIAGNOSIS — Z98.890 OTHER SPECIFIED POSTPROCEDURAL STATES: Chronic | ICD-10-CM

## 2023-09-02 DIAGNOSIS — O14.90 UNSPECIFIED PRE-ECLAMPSIA, UNSPECIFIED TRIMESTER: Chronic | ICD-10-CM

## 2023-09-02 DIAGNOSIS — Q21.1 ATRIAL SEPTAL DEFECT: Chronic | ICD-10-CM

## 2023-09-02 LAB
ALBUMIN SERPL ELPH-MCNC: 4.1 G/DL — SIGNIFICANT CHANGE UP (ref 3.3–5)
ALP SERPL-CCNC: 138 U/L — HIGH (ref 40–120)
ALT FLD-CCNC: 56 U/L — HIGH (ref 4–33)
ANION GAP SERPL CALC-SCNC: 11 MMOL/L — SIGNIFICANT CHANGE UP (ref 7–14)
AST SERPL-CCNC: 27 U/L — SIGNIFICANT CHANGE UP (ref 4–32)
BASOPHILS # BLD AUTO: 0.05 K/UL — SIGNIFICANT CHANGE UP (ref 0–0.2)
BASOPHILS NFR BLD AUTO: 0.4 % — SIGNIFICANT CHANGE UP (ref 0–2)
BILIRUB SERPL-MCNC: 0.4 MG/DL — SIGNIFICANT CHANGE UP (ref 0.2–1.2)
BUN SERPL-MCNC: 16 MG/DL — SIGNIFICANT CHANGE UP (ref 7–23)
CALCIUM SERPL-MCNC: 9.7 MG/DL — SIGNIFICANT CHANGE UP (ref 8.4–10.5)
CHLORIDE SERPL-SCNC: 103 MMOL/L — SIGNIFICANT CHANGE UP (ref 98–107)
CO2 SERPL-SCNC: 25 MMOL/L — SIGNIFICANT CHANGE UP (ref 22–31)
CREAT SERPL-MCNC: 0.74 MG/DL — SIGNIFICANT CHANGE UP (ref 0.5–1.3)
EGFR: 110 ML/MIN/1.73M2 — SIGNIFICANT CHANGE UP
EOSINOPHIL # BLD AUTO: 0.21 K/UL — SIGNIFICANT CHANGE UP (ref 0–0.5)
EOSINOPHIL NFR BLD AUTO: 1.8 % — SIGNIFICANT CHANGE UP (ref 0–6)
GLUCOSE SERPL-MCNC: 83 MG/DL — SIGNIFICANT CHANGE UP (ref 70–99)
HCT VFR BLD CALC: 38.2 % — SIGNIFICANT CHANGE UP (ref 34.5–45)
HGB BLD-MCNC: 12.6 G/DL — SIGNIFICANT CHANGE UP (ref 11.5–15.5)
IANC: 8.25 K/UL — HIGH (ref 1.8–7.4)
IMM GRANULOCYTES NFR BLD AUTO: 0.3 % — SIGNIFICANT CHANGE UP (ref 0–0.9)
LYMPHOCYTES # BLD AUTO: 2.67 K/UL — SIGNIFICANT CHANGE UP (ref 1–3.3)
LYMPHOCYTES # BLD AUTO: 22.7 % — SIGNIFICANT CHANGE UP (ref 13–44)
MCHC RBC-ENTMCNC: 28.5 PG — SIGNIFICANT CHANGE UP (ref 27–34)
MCHC RBC-ENTMCNC: 33 GM/DL — SIGNIFICANT CHANGE UP (ref 32–36)
MCV RBC AUTO: 86.4 FL — SIGNIFICANT CHANGE UP (ref 80–100)
MONOCYTES # BLD AUTO: 0.53 K/UL — SIGNIFICANT CHANGE UP (ref 0–0.9)
MONOCYTES NFR BLD AUTO: 4.5 % — SIGNIFICANT CHANGE UP (ref 2–14)
NEUTROPHILS # BLD AUTO: 8.25 K/UL — HIGH (ref 1.8–7.4)
NEUTROPHILS NFR BLD AUTO: 70.3 % — SIGNIFICANT CHANGE UP (ref 43–77)
NRBC # BLD: 0 /100 WBCS — SIGNIFICANT CHANGE UP (ref 0–0)
NRBC # FLD: 0 K/UL — SIGNIFICANT CHANGE UP (ref 0–0)
PLATELET # BLD AUTO: 397 K/UL — SIGNIFICANT CHANGE UP (ref 150–400)
POTASSIUM SERPL-MCNC: 3.9 MMOL/L — SIGNIFICANT CHANGE UP (ref 3.5–5.3)
POTASSIUM SERPL-SCNC: 3.9 MMOL/L — SIGNIFICANT CHANGE UP (ref 3.5–5.3)
PROT SERPL-MCNC: 7.7 G/DL — SIGNIFICANT CHANGE UP (ref 6–8.3)
RBC # BLD: 4.42 M/UL — SIGNIFICANT CHANGE UP (ref 3.8–5.2)
RBC # FLD: 12.7 % — SIGNIFICANT CHANGE UP (ref 10.3–14.5)
SODIUM SERPL-SCNC: 139 MMOL/L — SIGNIFICANT CHANGE UP (ref 135–145)
TROPONIN T, HIGH SENSITIVITY RESULT: <6 NG/L — SIGNIFICANT CHANGE UP
WBC # BLD: 11.75 K/UL — HIGH (ref 3.8–10.5)
WBC # FLD AUTO: 11.75 K/UL — HIGH (ref 3.8–10.5)

## 2023-09-02 PROCEDURE — 99285 EMERGENCY DEPT VISIT HI MDM: CPT

## 2023-09-02 PROCEDURE — 71046 X-RAY EXAM CHEST 2 VIEWS: CPT | Mod: 26

## 2023-09-02 PROCEDURE — 93010 ELECTROCARDIOGRAM REPORT: CPT

## 2023-09-02 RX ORDER — KETOROLAC TROMETHAMINE 30 MG/ML
30 SYRINGE (ML) INJECTION ONCE
Refills: 0 | Status: DISCONTINUED | OUTPATIENT
Start: 2023-09-02 | End: 2023-09-02

## 2023-09-02 RX ORDER — ACETAMINOPHEN 500 MG
1000 TABLET ORAL ONCE
Refills: 0 | Status: COMPLETED | OUTPATIENT
Start: 2023-09-02 | End: 2023-09-02

## 2023-09-02 RX ADMIN — Medication 400 MILLIGRAM(S): at 19:37

## 2023-09-02 RX ADMIN — Medication 30 MILLIGRAM(S): at 19:37

## 2023-09-02 NOTE — ED PROVIDER NOTE - NS ED ATTENDING STATEMENT MOD
This was a shared visit with the DOMINIQUE. I reviewed and verified the documentation and independently performed the documented:

## 2023-09-02 NOTE — ED PROVIDER NOTE - PROGRESS NOTE DETAILS
MURPHY CHOU:  Pt reports feeling better.  Pt medically stable for discharge.  Strict return precautions given. PT to follow up with PMD, her cardiologist and neuro (referral list provided).  Reassessment performed and plan for discharge discussed with Dr. Farias who agrees with disposition and discharge plan.

## 2023-09-02 NOTE — ED ADULT TRIAGE NOTE - CHIEF COMPLAINT QUOTE
c/o HA , sharp pain behind eyes for the past 10 days since arriving from [place], worse last few days, hx fo pre-eclampsia but states no possibility of pregnancy. PERRLA extremitatis are strong and equal B/l.

## 2023-09-02 NOTE — ED ADULT NURSE NOTE - OBJECTIVE STATEMENT
patient [id number] came in c/o head ache .h/o pre eclampsia. denies n/v/. denies any other complaints. labs done and meds given as ordered. awaiting results.

## 2023-09-02 NOTE — ED PROVIDER NOTE - CLINICAL SUMMARY MEDICAL DECISION MAKING FREE TEXT BOX
Patient is a 32-year-old female with a past medical history of atrial septal defect repair, hypertension, migraines, preeclampsia presents with headache x2 weeks.  Patient reports while vacationing in [place], she developed intermittent right occipital, stabbing, at most 7 out of 10 headache which resolves with ibuprofen.  Patient reports 2 days ago, headache began to radiate to right retro-ocular region.  Patient reports associated photophobia.  Patient reports at 330 this afternoon, while driving, she had an episode of left-sided chest pain described as tightness.  She reports chest pain is nonexertional, nonpleuritic, nonradiating, nonpositional.  Patient denies any fevers, chills, nausea, vomiting, pain with eye movement, eye pain, changes in vision or hearing, blurred vision, vision loss, head injury, use of blood thinners, neck pain/stiffness, shortness of breath, palpitations, history of DVT/PE, history of malignancy, recent surgeries, exogenous hormone use, calf pain/swelling, illicit drug use, alcohol abuse.  This is a patient with a headache, possibly migraine; very low clinical suspicion for disease processes including but not limited to intracranial hemorrhage, meningitis, optic neuritis.  This is a patient with chest pain; very low clinical suspicion for disease processes including but not limited to tension pneumothorax, aortic dissection, esophageal rupture, pulmonary embolism.  Plan to order labs, troponin, EKG, chest x-ray, Tylenol, Toradol.  Disposition pending work-up. Dinora att: 32-year-old female with a past medical history of atrial septal defect repair, hypertension, migraines, preeclampsia presents with headache x2 weeks.  Patient reports while vacationing in [place], she developed intermittent right occipital, stabbing, at most 7 out of 10 headache which resolves with ibuprofen.  Patient reports 2 days ago, headache began to radiate to right retro-ocular region.  Patient reports associated photophobia.  Patient reports at 330 this afternoon, while driving, she had an episode of left-sided chest pain described as tightness.  She reports chest pain is nonexertional, nonpleuritic, non-radiating, non-positional.  Patient denies any fevers, chills, nausea, vomiting, pain with eye movement, eye pain, changes in vision or hearing, blurred vision, vision loss, head injury, use of blood thinners, neck pain/stiffness, shortness of breath, palpitations, history of DVT/PE, history of malignancy, recent surgeries, exogenous hormone use, calf pain/swelling, illicit drug use, alcohol abuse.  This is a patient with a headache, possibly migraine; very low clinical suspicion for disease processes including but not limited to intracranial hemorrhage, meningitis, optic neuritis.  This is a patient with chest pain; very low clinical suspicion for disease processes including but not limited to tension pneumothorax, aortic dissection, esophageal rupture, pulmonary embolism.  Plan to order labs, troponin, EKG, chest x-ray, Tylenol, Toradol.  Disposition pending work-up.

## 2023-09-02 NOTE — ED PROVIDER NOTE - TOBACCO USE
----- Message from Kimberley Hernandez sent at 5/26/2022 10:16 AM CDT -----  Contact: mom Nghia   Mom would like a call back to schedule an appt      Unknown if ever smoked

## 2023-09-02 NOTE — ED ADULT NURSE NOTE - EXTENSIONS OF SELF_ADULT
[facility] Cardiac Health Progress Note    Steffanie Yanez is a 80year old female who presents to clinic for APN assessment and management of chronic HFpEF/RV heart failure and is functional class 3.      Subjective:  She returns for asses lb 12.8 oz (60.7 kg)      Lab Results   Component Value Date    CREATSERUM 1.39 11/06/2019    BUN 39 11/06/2019     11/06/2019    K 4.6 11/06/2019     11/06/2019    CO2 23.0 11/06/2019     11/06/2019    CA 9.3 11/06/2019         Current Chronic Afib - Hx [employer] PPM with last device interrogation showing no VHR events,  37%. Coumadin managed by [facility] and INR checks at [facility]. 5. HTN - controlled with lower dose Norvasc.   6.  CAD - hx CABG; last stress test 2017 and norm None

## 2023-09-02 NOTE — ED PROVIDER NOTE - PHYSICAL EXAMINATION
-Cranial Nerves:  --CN II: PERRLA  --CN III, IV, VI: EOMI b/l  --CN V1-3: Facial sensation intact to touch  --CN VII: No facial asymmetry or droop  --CN VIII: Hearing intact to rubbing fingers b/l  --CN IX, X: Palate elevates symmetrically. Normal phonation  --CN XI: Heading turning and shoulder shrug intact b/l  --CN XII: Tongue midline with normal movements     Normal bilateral FTN and HTS.  Rapid alternating movements intact.  Negative rhomberg.     No LE edema.  No calf tenderness.  No palpable cords.  2+ pulses bilaterally.    No temporal tenderness.    IOP right eye:  11 mm Hg  IOP left eye:  13 mmHg

## 2023-09-02 NOTE — ED PROVIDER NOTE - OBJECTIVE STATEMENT
Patient is a 32-year-old female with a past medical history of atrial septal defect repair, hypertension, migraines, preeclampsia presents with headache x2 weeks.  Patient reports while vacationing in [place], she developed intermittent right occipital, stabbing, at most 7 out of 10 headache which resolves with ibuprofen.  Patient reports 2 days ago, headache began to radiate to right retro-ocular region.  Patient reports associated photophobia.  Patient reports at 330 this afternoon, while driving, she had an episode of left-sided chest pain described as tightness.  She reports chest pain is nonexertional, nonpleuritic, nonradiating, nonpositional.  Patient denies any fevers, chills, nausea, vomiting, pain with eye movement, eye pain, changes in vision or hearing, blurred vision, vision loss, head injury, use of blood thinners, neck pain/stiffness, shortness of breath, palpitations, history of DVT/PE, history of malignancy, recent surgeries, exogenous hormone use, calf pain/swelling, illicit drug use, alcohol abuse.

## 2023-09-02 NOTE — ED PROVIDER NOTE - NSFOLLOWUPINSTRUCTIONS_ED_ALL_ED_FT
Advance activity as tolerated.  Continue all previously prescribed medications as directed unless otherwise instructed.  Take Tylenol 650mg (Two 325 mg pills) every 4-6 hours as needed for pain or fevers.  Follow up with your primary care physician, your cardiologist, and neurology (referral list provided or you may follow up in the clinic, please call [phone])  in 48-72 hours- bring copies of your results.  Return to the ER for worsening or persistent symptoms, including but not limited to worsening/persistent headache, numbness, weakness, changes in vision or hearing, dizziness, difficulty walking, falls, slurred speech, neck stiffness, fevers, lightheadedness and/or ANY NEW OR CONCERNING SYMPTOMS. If you have issues obtaining follow up, please call: [phone] to obtain a doctor or specialist who takes your insurance in your area.  You may call [phone] to make an appointment with the internal medicine clinic.    ACUTE HEADACHE - General Information    Acute Headache    WHAT YOU NEED TO KNOW:    What is an acute headache? An acute headache is pain or discomfort that may start suddenly and get worse quickly. You may have an acute headache only when you feel stress or eat certain foods. Other acute headache pain can happen every day, and sometimes several times a day.    What are the most common types of acute headache?    Tension headache is the most common type of headache. These headaches typically occur in the late afternoon and go away by evening. The pain is usually mild or moderate. You may have problems tolerating bright light or loud noise. The pain is usually across the forehead or in the back of the head, often only on one side. These headaches may occur every day.    Migraine headaches cause moderate or severe pain. The headache generally lasts from 1 to 3 days and tends to come back. Pain is usually on only one side, but it may change sides. Migraines often occur in the temple, the back of the head, or behind the eye. The pain may throb or be sharp and steady.    A migraine with aura means you see or feel something before a migraine. You may see a small spot surrounded by bright zigzag lines. Other signs or symptoms may follow the aura.    Cluster headache pain is usually only on one side. It often causes severe pain, and can last for 30 minutes to 2 hours. These headaches may occur 1 or 2 times each day, more often at night. The pain may wake you.  What causes acute headaches? The cause of your headache may not be known. The following can trigger a headache:    Stress or tension, hours or even days after stressful events    Fatigue, a lack of sleep or changes in your usual sleep pattern, or a nap during the day    Menstruation, especially after pregnancy, or use of birth control pills or hormone replacement therapy    Food such as cured meats, artificial sweeteners, alcohol, dark chocolate, and MSG    Suddenly not having caffeine if you usually have larger amounts    A medical problem, such as an infection, tooth pain, neck or sinus pain, thyroid problems, or a tumor    A head injury  How is the type of acute headache diagnosed and treated? Your healthcare provider will ask you to describe your pain and rate it on a scale from 1 to 10. Tell the provider how often you have headaches and how long they last. Also describe any other symptoms you have along with headaches, such as dizziness or blurred vision. You may need tests including a CT scan to make sure there is not a leak in any blood vessels.    Medicines may be given to manage or prevent headaches. The medicine will depend on the type of acute headache you have. Do not wait until the pain is severe before you take your medicine. You may be able to take over-the-counter pain medicines as needed. Examples include NSAIDs and acetaminophen. Ask your healthcare provider which medicine is right for you. Ask how much to take and when to take it. Follow directions. These medicines can cause stomach bleeding or kidney or liver damage if not taken correctly.    Biofeedback may be used to help you manage stress. Electrodes (wires) are placed on your body and attached to a monitor. You will learn how to change stress reactions. For example, you learn to slow your heart rate when you become upset.    Cognitive behavior therapy, or stress management, may be used with other therapies to prevent headaches.  What can I do to manage my symptoms?    Apply heat or ice on the headache area. Use a heat or ice pack. For an ice pack, you can also put crushed ice in a plastic bag. Cover the pack or bag with a towel before you apply it to your skin. Ice and heat both help decrease pain, and heat also helps decrease muscle spasms. Apply heat for 20 to 30 minutes every 2 hours. Apply ice for 15 to 20 minutes every hour. Apply heat or ice for as long and for as many days as directed. You may alternate heat and ice.    Relax your muscles. Lie down in a comfortable position and close your eyes. Relax your muscles slowly. Start at your toes and work your way up your body.    Keep a record of your headaches. Write down when your headaches start and stop. Include your symptoms and what you were doing when the headache began. Record what you ate or drank for 24 hours before the headache started. Describe the pain and where it hurts. Keep track of what you did to treat your headache and if it worked.  What can I do to prevent an acute headache?    Avoid anything that triggers an acute headache. Examples include exposure to chemicals, going to high altitude, or not getting enough sleep. Create a regular sleep routine. Go to sleep at the same time and wake up at the same time each day. Do not use electronic devices before bedtime. These may trigger a headache or prevent you from sleeping well.    Do not smoke. Nicotine and other chemicals in cigarettes and cigars can trigger an acute headache or make it worse. Ask your healthcare provider for information if you currently smoke and need help to quit. E-cigarettes or smokeless tobacco still contain nicotine. Talk to your healthcare provider before you use these products.    Limit alcohol as directed. Alcohol can trigger an acute headache or make it worse. If you have cluster headaches, do not drink alcohol during an episode. For other types of headaches, ask your healthcare provider if it is safe for you to drink alcohol. Ask how much is safe for you to drink, and how often.    Exercise as directed. Exercise can reduce tension and help with headache pain. Aim for 30 minutes of physical activity on most days of the week. Your healthcare provider can help you create an exercise plan.    Eat a variety of healthy foods. Healthy foods include fruits, vegetables, low-fat dairy products, lean meats, fish, whole grains, and cooked beans. Your healthcare provider or dietitian can help you create meals plans if you need to avoid foods that trigger headaches.  When should I seek immediate care?    You have severe pain.    You have numbness or weakness on one side of your face or body.    You have a headache that occurs after a blow to the head, a fall, or other trauma.    You have a headache, are forgetful or confused, or have trouble speaking.    You have a headache, stiff neck, and a fever.  When should I call my doctor?    You have a constant headache and are vomiting.    You have a headache each day that does not get better, even after treatment.    You have changes in your headaches, or new symptoms that occur when you have a headache.    You have questions or concerns about your condition or care.  CARE AGREEMENT:    You have the right to help plan your care. Learn about your health condition and how it may be treated. Discuss treatment options with your healthcare providers to decide what care you want to receive. You always have the right to refuse treatment.    © [employer] 1973, 2023    CHEST PAIN - Discharge Care    Chest Pain    WHAT YOU NEED TO KNOW:    Chest pain can be caused by a range of conditions, from not serious to life-threatening. Chest pain can be a symptom of a digestive problem, such as acid reflux or a stomach ulcer. An anxiety attack or a strong emotion, such as anger, can also cause chest pain. Infection, inflammation, or a fracture in the bones or cartilage in your chest can cause pain or discomfort. Sometimes chest pain or pressure is caused by poor blood flow to your heart (angina). Chest pain may also be caused by life-threatening conditions such as a heart attack or blood clot in your lungs.    DISCHARGE INSTRUCTIONS:    Call your local emergency number (911 in the [place]) or have someone call if:    You have any of the following signs of a heart attack:  Squeezing, pressure, or pain in your chest    You may also have any of the following:  Discomfort or pain in your back, neck, jaw, stomach, or arm    Shortness of breath    Nausea or vomiting    Lightheadedness or a sudden cold sweat    Seek care immediately if:    You have chest discomfort that gets worse, even with medicine.    You cough or vomit blood.    Your bowel movements are black or bloody.    You cannot stop vomiting, or it hurts to swallow.  Call your doctor if:    You have questions or concerns about your condition or care.    Medicines:    Medicines may be given to treat the cause of your chest pain. Examples include pain medicine, anxiety medicine, or medicines to increase blood flow to your heart.    Do not take certain medicines without asking your healthcare provider first. These include NSAIDs, herbal or vitamin supplements, and hormones, such as estrogen or progestin.    Take your medicine as directed. Contact your healthcare provider if you think your medicine is not helping or if you have side effects. Tell your provider if you are allergic to any medicine. Keep a list of the medicines, vitamins, and herbs you take. Include the amounts, and when and why you take them. Bring the list or the pill bottles to follow-up visits. Carry your medicine list with you in case of an emergency.  Healthy living tips: If the cause of your chest pain is known, your healthcare provider will give you specific guidelines to follow. The following are general healthy guidelines:    Do not smoke. Nicotine and other chemicals in cigarettes and cigars can cause lung and heart damage. Ask your healthcare provider for information if you currently smoke and need help to quit. E-cigarettes or smokeless tobacco still contain nicotine. Talk to your healthcare provider before you use these products.    Choose a variety of healthy foods as often as possible. Include fresh, frozen, or canned fruits and vegetables. Also include low-fat dairy products, fish, chicken (without skin), and lean meats. Your healthcare provider or a dietitian can help you create meal plans. You may need to avoid certain foods or drinks if your pain is caused by a digestion problem.  Healthy Foods      Lower your sodium (salt) intake. Limit foods that are high in sodium, such as canned foods, salty snacks, and cold cuts. If you add salt when you cook food, do not add more at the table. Choose low-sodium canned foods as much as possible.        Drink plenty of water every day. Water helps your body to control temperature and blood pressure. Ask your healthcare provider how much water you should drink every day.    Ask about activity. Your healthcare provider will tell you which activities to limit or avoid. Ask when you can drive, return to work, and have sex. Ask about the best exercise plan for you.    Maintain a healthy weight. Ask your healthcare provider what a healthy weight is for you. Ask him or her to help you create a safe weight loss plan if you are overweight.    Ask about vaccines you may need. Your healthcare provider can tell you which vaccines you need, and when to get them. The following vaccines help prevent diseases that can become serious for a person with a heart condition:  The influenza (flu) vaccine is given each year. Get a flu vaccine as soon as recommended, usually in September or October.    The pneumonia vaccine is usually given every 5 years. Your healthcare provider may recommend the pneumonia vaccine if you are 65 or older.    COVID-19 vaccines are given to adults as a shot in 1 or 2 doses. Vaccination is recommended for all adults. A booster (additional) dose is also recommended for all adults. A second booster is recommended for all adults 50 or older and for immunocompromised adults 18 or older. The second booster is also recommended for adults who received the 1-dose vaccine for the first and booster doses. Your healthcare provider can tell you when to get one or both boosters.  Prevent Heart Disease   Follow up with your doctor within 72 hours, or as directed: You may need to return for more tests to find the cause of your chest pain. You may be referred to a specialist, such as a cardiologist or gastroenterologist. Write down your questions so you remember to ask them during your visits.    © [employer] 1973, 2023

## 2023-09-02 NOTE — ED PROVIDER NOTE - PATIENT PORTAL LINK FT
You can access the FollowMyHealth Patient Portal offered by [facility] by registering at the following website: [email or web address]. By joining [facility]’s FollowMyHealth portal, you will also be able to view your health information using other applications (apps) compatible with our system.

## 2023-09-02 NOTE — ED ADULT NURSE NOTE - NSFALLUNIVINTERV_ED_ALL_ED
Bed/Stretcher in lowest position, wheels locked, appropriate side rails in place/Call bell, personal items and telephone in reach/Instruct patient to call for assistance before getting out of bed/chair/stretcher/Non-slip footwear applied when patient is off stretcher/[place] to call system/Physically safe environment - no spills, clutter or unnecessary equipment/Purposeful proactive rounding/Room/bathroom lighting operational, light cord in reach

## 2024-05-28 NOTE — DISCHARGE NOTE PROVIDER - NSRESEARCHGRANT_MLMHIDDEN_GEN_A_CORE
Patient's daughter called and stated patient had fallen a few days ago and is now c/o pain in his chest area with movement.  She was concerned something had happened to his sternum since he is s/p CABG.  I let her know that it is unlikely that this effected his sternum from CABG since his CABG was almost 2 years ago and it should be healed at this point.  I advised her to call patient's PCP first.  I did tell her that if the PCP would like for us to see him, we'd be happy to, but they needed to speak to her first.   yes

## 2024-08-16 NOTE — OB RN PATIENT PROFILE - PAIN RATING/NUMBER SCALE (0-10): ACTIVITY
Physical Therapy Progress Note    Visit Type: Progress Note  Visit: 6  Referring Provider: Hailee Garza MD  Medical Diagnosis (from order): M25.512 - Acute pain of left shoulder  M54.50, G89.29 - Chronic bilateral low back pain without sciatica     SUBJECTIVE                                                                                                               Still having some pain going across her stomach in the morning - gets better once she is out of bed. Does not change with bowel/bladder movement or else food intake etc. Worse after she has been lying in bed.     Functional Change: Improved range of motion in the left arm, less pain with reaching overhead or stretching the arm out to the side, able to lie on left side which she was not able to do prior for sleep   Current Functional Limitations: Reaching behind the back to scratch her back, sleeping an entire night on left side, lifting and carrying with left hand     Pain / Symptoms  - Pain rating (out of 10): Current: 2       OBJECTIVE                                                                                                                     Range of Motion (ROM)   (degrees unless noted; active unless noted; norms in ( ); negative=lacking to 0, positive=beyond 0)  Shoulder:    - Internal Rotation:        - at 90° (70-90):            • Left: 54    - External Rotation:       - at 90° (90):            • Left: 52                     Outcome/Assessments  Outcome Measures:   Quick Disabilities of the Arm, Shoulder and Hand: QuickDash Total Score (Score will not calculate if more then 2 questions are left blank): 20.45   (scored 0-100; a higher score indicates greater disability) see flowsheet for additional documentation    OSWESTRY Total Scored: 7  OSWESTRY Total Possible Score: 45  OSWESTRY Score Calculated: 15.56 %  (0-20% = minimal disability; 20-40% = moderate disability; 40-60% = severe disability; 60-80% = crippled; % = bed bound)  see flowsheet for additional documentation        Treatment     Therapeutic Exercise  Nustep with arms level 4 x 6 min   Bilateral shoulder external rotation (yellow) 2 x 15   Standing bilateral shoulder flexion (yellow loop at wrists) 2 x 15  Sleeper stretch x 30 seconds left         Neuromuscular Re-Education  Hookyling transversus abdominis isometrics - valsalva   Diaphragmatic breathing with self-cues, extensive instruction/verbal cues for ribcage and abdominal expansion versus all anterior sternal breathing   -re-try with 2 hands on belly -cues to breathe into hands so they spread apart from each other   -added active exhalation  with belly activation, progressed to full abdominal isometric with exhalation   Hooklyng hip flexor isometrics 3-4 seconds x 5 each         Skilled input: verbal instruction/cues, tactile instruction/cues, demonstration and facilitation    Writer verbally educated and received verbal consent for hand placement, positioning of patient, and techniques to be performed today from patient for hand placement and palpation for techniques and therapist position for techniques as described above and how they are pertinent to the patient's plan of care.  Home Exercise Program  Access Code: [email or web address]  URL: [email or web address]  Date: 08/16/2024  Prepared by: Fina Marte    Exercises  - Sleeper Stretch  - 1-2 x daily - 1 sets - 1 reps - 30 seconds hold  - Shoulder External Rotation and Scapular Retraction with Resistance  - 1 x daily - 2-3 sets - 15 reps  - Shoulder Flexion Serratus Activation with Resistance  - 1 x daily - 2 sets - 15 reps    Exercises  - Supine Transversus Abdominis Bracing - Hands on Stomach  - 1 x daily - 7 x weekly - 3 sets - 10 reps - 5 seconds hold  - Hooklying Isometric Hip Flexion  - 1 x daily - 2 sets - 5 reps - 3-4 seconds hold  - Standing Pelvic Tilt to Neutral Spine at Wall  - 1 x daily - 1 sets - 10-20 reps        ASSESSMENT                                                                                                           To date the patient has made gains as expected.  Progress in active range of motion: Moderate  Progress in function: Moderate  Progress since first visit: Significant progress  Patient will continue to benefit from skilled care as outlined.  Patient continues to have impairments and functional deficits as noted.    Rhona has participated in 6 total visits of physical therapy, initially to address left shoulder pain with back pain added at visit 4. Rhona demonstrates steady progress with shoulder range of motion and pain as evidenced by improved motion in life and improved ability to sleep on left side. Continues to struggle to activate her abdominal muscles without valsalva compensation, most notable struggle with transversus abdominis activation. Marked improvement in both QDASH and Oswestry outcomes since start of physical therapy. Rhona will continue to benefit from skilled physical therapy to progress her functional motion and strength to improve ability to reach behind the back, standing, and walk for prolonged periods of time.   Education:   - Results of above outlined education: Verbalizes understanding and Needs reinforcement    PLAN                                                                                                                          Continue interventions, frequency and duration established at initial evaluation.  Requesting additional 14 visits.    Suggestions for next session as indicated: Progress per plan of care - authorization required - core stabilization progression, work on left shoulder functional internal rotation      Goals  Long Term Goals: to be met by end of plan of care  1. Patient will reach behind back with her left hand to at least spinous process of L5 without pain/symptoms in left arm for washing her back and hooking her bra.  Status: progressing/ongoing  2. Patient will reach  overhead without increased left arm pain to at least 130° to improve ease of activities of daily living such as dressing, grocery shopping, dishes.  Status: met   3. Patient will demonstrate or report hair washing/styling without left arm pain in order to wash hair without compensation or limitation.  Status: progressing/ongoing  4. Quick DASH: Patient will score 22 or lower on The Quick DASH to indicate a decreased level of impairment with lifting, carrying, household and self-care activity. (minimal clinically important difference: 14 of calculated score)  Status: met  Updated 8/16: Improved QDASH from 20.4 on 8/16 to less than 12 by discharge to demonstrates return to baseline functional upper extremity mobility.   5. Patient will be independent with progressed and modified home exercise program. Status: progressing/ongoing  6. Patient will report tolerating standing for >45 minutes without pain/symptoms for completion of household tasks such as meal preparation and hygiene tasks. Status: progressing/ongoing      Therapy procedure time and total treatment time can be found documented on the Time Entry flowsheet     0

## 2024-10-31 NOTE — OB RN PATIENT PROFILE - NS_MODEOFARRIVAL_OBGYN_ALL_OB
"Medical Nutrition Therapy    GOALS  Continue to work on balanced meals - plate method as a guide  Continue to monitor portion sizes  Incorporate protein in at breakfast  Try a protein shake??  Keep up great activity!!!       Nutrition Reassessment  [facility], accompanied by father.    Anthropometrics  Age:  13 year old female   Wt Readings from Last 4 Encounters:   10/31/24 94.6 kg (208 lb 8.9 oz) (>99%, Z= 2.63)*   09/10/24 (!) 104.8 kg (231 lb 0.7 oz) (>99%, Z= 2.92)*   08/29/24 (!) 106.7 kg (235 lb 3.7 oz) (>99%, Z= 2.97)*   07/31/24 (!) 110.9 kg (244 lb 6.4 oz) (>99%, Z= 3.08)*     * Growth percentiles are based on CDC (Girls, 2-20 Years) data.     Ht Readings from Last 2 Encounters:   10/31/24 1.68 m (5' 6.14\") (93%, Z= 1.45)*   09/10/24 1.675 m (5' 5.95\") (93%, Z= 1.45)*     * Growth percentiles are based on CDC (Girls, 2-20 Years) data.     Estimated body mass index is 33.52 kg/m  as calculated from the following:    Height as of this encounter: 1.68 m (5' 6.14\").    Weight as of this encounter: 94.6 kg (208 lb 8.9 oz).  Weight Loss 23 lbs since last clinic visit on 9/10/24.    Nutrition History  Patient seen in [facility] for weight management nutrition follow up. Patient has lost about 23 lbs in the past 7 weeks. She reports that she is doing well and happy with her progress. She continues to take Wegovy. Up to full dose of 2.4 mg and tolerating it very well - denies any negative side effects at this time. Patient reports that appetite has decreased and not feeling has hungry. For example, she states that she doesn't finish her lunch she will pack for school. She is having a light breakfast of just an applesauce. Packs a sandwich (Turkey and cheese), bag of chips and dessert (eat the whole sandwich, 1/2 chips and no dessert). Hasn't been snacking much lately. Dinner is often later in the night due to activities.     Patient just finished soccer a few weeks ago. " She is currently still in swimming - has sectioned next weekend. Soccer will start up again on the second weekend November.     Eating Behaviors/Eating Environment: takes a lot of food to feel full, hungry, poor satiety, feeling hungry all the time.      Social: Lives with parents and two siblings (she is the middle child). Currently in 7th grade. On swim team.     Nutritional Intakes  Breakfast: applesauce   Am Snack: none reported   Lunch: packs - sandwich/leftovers(spaghetti or chicken), chip, dessert   PM Snack: none reported   Dinner: 9 pm - Chicken, side of vegetables   HS Snack: None reported      Food Frequency:  Preferred Fruits: good variety   Preferred Vegetables: good variety   Preferred Protein Sources: good       Previous Goals & Progress  Food log/track intake for next RD appt  -goal not met  Continue to incorporate fiber and protein-rich foods into snacks - goal not applicable   Incorporate more protein at breakfast- ongoing goal   Modify smoothie   Choose between milk and yogurt  Choose 1-2 fruits   Choose 1 juice or none   Keep evening snack to <100 kcal  - goal not needed    Medications/Vitamins/Minerals    Current Outpatient Medications:     Semaglutide-Weight Management (WEGOVY) 1.7 MG/0.75ML pen, Inject 1.7 mg Subcutaneous once a week (Patient not taking: Reported on 9/10/2024), Disp: 3 mL, Rfl: 2    Semaglutide-Weight Management (WEGOVY) 2.4 MG/0.75ML pen, Inject 2.4 mg subcutaneously once a week., Disp: 3 mL, Rfl: 3    Nutrition-Related Labs  Reviewed    Nutrition Diagnosis  Obesity related to excessive energy intake as evidenced by BMI/age >95th %ile    Interventions & Education  Provided written and verbal education on the following:    Plate Method  Healthy lunchs  Healthy meals/cooking  Healthy snacks  Healthy beverages  Portion sizes  Increase fruit and vegetable intake  Consume 3 or 4 meals a day    Monitoring/Evaluation  Will continue to monitor progress towards goals and provide  education in Pediatric Weight Management.    Spent 30 minutes in consult with patient & father.      Michelle Ribeiro MS, RD, LD  Pager # [phone]         Car

## 2024-11-02 NOTE — DISCHARGE NOTE OB - AFTER URINATION AND/OR BOWEL MOVEMENT, CLEAN WITH WARM WATER USING A PERI- BOTTLE, THEN PAT DRY WITH TOILET TISSUE
[facility] PHYSICIANS [facility] WALK-IN [facility]  [address]  [address]  [facility] [address]  Dept: [phone]  Dept Fax: [phone]    Lonnie Downey 
Statement Selected

## 2025-02-18 ENCOUNTER — INPATIENT (INPATIENT)
Facility: HOSPITAL | Age: 35
LOS: 2 days | Discharge: HOME | End: 2025-02-21
Attending: NEUROLOGICAL SURGERY | Admitting: NEUROLOGICAL SURGERY
Payer: COMMERCIAL

## 2025-02-18 VITALS
SYSTOLIC BLOOD PRESSURE: 146 MMHG | HEART RATE: 77 BPM | WEIGHT: 199.96 LBS | RESPIRATION RATE: 16 BRPM | HEIGHT: 67 IN | TEMPERATURE: 98 F | OXYGEN SATURATION: 98 % | DIASTOLIC BLOOD PRESSURE: 80 MMHG

## 2025-02-18 DIAGNOSIS — Q21.1 ATRIAL SEPTAL DEFECT: Chronic | ICD-10-CM

## 2025-02-18 DIAGNOSIS — Z29.9 ENCOUNTER FOR PROPHYLACTIC MEASURES, UNSPECIFIED: ICD-10-CM

## 2025-02-18 DIAGNOSIS — Z98.890 OTHER SPECIFIED POSTPROCEDURAL STATES: Chronic | ICD-10-CM

## 2025-02-18 DIAGNOSIS — K46.9 UNSPECIFIED ABDOMINAL HERNIA WITHOUT OBSTRUCTION OR GANGRENE: Chronic | ICD-10-CM

## 2025-02-18 DIAGNOSIS — D72.829 ELEVATED WHITE BLOOD CELL COUNT, UNSPECIFIED: ICD-10-CM

## 2025-02-18 DIAGNOSIS — I10 ESSENTIAL (PRIMARY) HYPERTENSION: ICD-10-CM

## 2025-02-18 DIAGNOSIS — Z98.89 OTHER SPECIFIED POSTPROCEDURAL STATES: Chronic | ICD-10-CM

## 2025-02-18 DIAGNOSIS — O14.90 UNSPECIFIED PRE-ECLAMPSIA, UNSPECIFIED TRIMESTER: Chronic | ICD-10-CM

## 2025-02-18 LAB
ADD ON TEST-SPECIMEN IN LAB: SIGNIFICANT CHANGE UP
ALBUMIN SERPL ELPH-MCNC: 4 G/DL — SIGNIFICANT CHANGE UP (ref 3.3–5)
ALP SERPL-CCNC: 117 U/L — SIGNIFICANT CHANGE UP (ref 40–120)
ALT FLD-CCNC: 31 U/L — SIGNIFICANT CHANGE UP (ref 4–33)
ANION GAP SERPL CALC-SCNC: 11 MMOL/L — SIGNIFICANT CHANGE UP (ref 7–14)
AST SERPL-CCNC: 12 U/L — SIGNIFICANT CHANGE UP (ref 4–32)
BASOPHILS # BLD AUTO: 0.1 K/UL — SIGNIFICANT CHANGE UP (ref 0–0.2)
BASOPHILS NFR BLD AUTO: 0.7 % — SIGNIFICANT CHANGE UP (ref 0–2)
BILIRUB SERPL-MCNC: 0.3 MG/DL — SIGNIFICANT CHANGE UP (ref 0.2–1.2)
BUN SERPL-MCNC: 11 MG/DL — SIGNIFICANT CHANGE UP (ref 7–23)
CALCIUM SERPL-MCNC: 9 MG/DL — SIGNIFICANT CHANGE UP (ref 8.4–10.5)
CHLORIDE SERPL-SCNC: 101 MMOL/L — SIGNIFICANT CHANGE UP (ref 98–107)
CO2 SERPL-SCNC: 25 MMOL/L — SIGNIFICANT CHANGE UP (ref 22–31)
CREAT SERPL-MCNC: 0.66 MG/DL — SIGNIFICANT CHANGE UP (ref 0.5–1.3)
EGFR: 118 ML/MIN/1.73M2 — SIGNIFICANT CHANGE UP
EOSINOPHIL # BLD AUTO: 0.09 K/UL — SIGNIFICANT CHANGE UP (ref 0–0.5)
EOSINOPHIL NFR BLD AUTO: 0.6 % — SIGNIFICANT CHANGE UP (ref 0–6)
GLUCOSE SERPL-MCNC: 121 MG/DL — HIGH (ref 70–99)
HCG SERPL-ACNC: <1 MIU/ML — SIGNIFICANT CHANGE UP
HCT VFR BLD CALC: 39.5 % — SIGNIFICANT CHANGE UP (ref 34.5–45)
HGB BLD-MCNC: 13.2 G/DL — SIGNIFICANT CHANGE UP (ref 11.5–15.5)
IANC: 11.14 K/UL — HIGH (ref 1.8–7.4)
IMM GRANULOCYTES NFR BLD AUTO: 0.7 % — SIGNIFICANT CHANGE UP (ref 0–0.9)
LYMPHOCYTES # BLD AUTO: 17.6 % — SIGNIFICANT CHANGE UP (ref 13–44)
LYMPHOCYTES # BLD AUTO: 2.61 K/UL — SIGNIFICANT CHANGE UP (ref 1–3.3)
MAGNESIUM SERPL-MCNC: 2 MG/DL — SIGNIFICANT CHANGE UP (ref 1.6–2.6)
MCHC RBC-ENTMCNC: 28.3 PG — SIGNIFICANT CHANGE UP (ref 27–34)
MCHC RBC-ENTMCNC: 33.4 G/DL — SIGNIFICANT CHANGE UP (ref 32–36)
MCV RBC AUTO: 84.6 FL — SIGNIFICANT CHANGE UP (ref 80–100)
MONOCYTES # BLD AUTO: 0.8 K/UL — SIGNIFICANT CHANGE UP (ref 0–0.9)
MONOCYTES NFR BLD AUTO: 5.4 % — SIGNIFICANT CHANGE UP (ref 2–14)
NEUTROPHILS # BLD AUTO: 11.14 K/UL — HIGH (ref 1.8–7.4)
NEUTROPHILS NFR BLD AUTO: 75 % — SIGNIFICANT CHANGE UP (ref 43–77)
NRBC # BLD AUTO: 0 K/UL — SIGNIFICANT CHANGE UP (ref 0–0)
NRBC # FLD: 0 K/UL — SIGNIFICANT CHANGE UP (ref 0–0)
NRBC BLD AUTO-RTO: 0 /100 WBCS — SIGNIFICANT CHANGE UP (ref 0–0)
PHOSPHATE SERPL-MCNC: 4.1 MG/DL — SIGNIFICANT CHANGE UP (ref 2.5–4.5)
PLATELET # BLD AUTO: 353 K/UL — SIGNIFICANT CHANGE UP (ref 150–400)
POTASSIUM SERPL-MCNC: 3.9 MMOL/L — SIGNIFICANT CHANGE UP (ref 3.5–5.3)
POTASSIUM SERPL-SCNC: 3.9 MMOL/L — SIGNIFICANT CHANGE UP (ref 3.5–5.3)
PROT SERPL-MCNC: 6.8 G/DL — SIGNIFICANT CHANGE UP (ref 6–8.3)
RBC # BLD: 4.67 M/UL — SIGNIFICANT CHANGE UP (ref 3.8–5.2)
RBC # FLD: 12.5 % — SIGNIFICANT CHANGE UP (ref 10.3–14.5)
SODIUM SERPL-SCNC: 137 MMOL/L — SIGNIFICANT CHANGE UP (ref 135–145)
WBC # BLD: 14.84 K/UL — HIGH (ref 3.8–10.5)
WBC # FLD AUTO: 14.84 K/UL — HIGH (ref 3.8–10.5)

## 2025-02-18 PROCEDURE — 72149 MRI LUMBAR SPINE W/DYE: CPT | Mod: 26

## 2025-02-18 PROCEDURE — 99285 EMERGENCY DEPT VISIT HI MDM: CPT | Mod: 25

## 2025-02-18 PROCEDURE — 99223 1ST HOSP IP/OBS HIGH 75: CPT

## 2025-02-18 RX ORDER — ENOXAPARIN SODIUM 100 MG/ML
40 INJECTION SUBCUTANEOUS EVERY 24 HOURS
Refills: 0 | Status: DISCONTINUED | OUTPATIENT
Start: 2025-02-18 | End: 2025-02-19

## 2025-02-18 RX ORDER — ALPRAZOLAM 0.5 MG
0.5 TABLET, EXTENDED RELEASE 24 HR ORAL ONCE
Refills: 0 | Status: DISCONTINUED | OUTPATIENT
Start: 2025-02-18 | End: 2025-02-18

## 2025-02-18 RX ORDER — LIDOCAINE HYDROCHLORIDE 20 MG/ML
1 JELLY TOPICAL EVERY 24 HOURS
Refills: 0 | Status: DISCONTINUED | OUTPATIENT
Start: 2025-02-18 | End: 2025-02-21

## 2025-02-18 RX ORDER — HYDROMORPHONE/SOD CHLOR,ISO/PF 2 MG/10 ML
0.5 SYRINGE (ML) INJECTION ONCE
Refills: 0 | Status: DISCONTINUED | OUTPATIENT
Start: 2025-02-18 | End: 2025-02-18

## 2025-02-18 RX ORDER — LIDOCAINE HYDROCHLORIDE 20 MG/ML
1 JELLY TOPICAL ONCE
Refills: 0 | Status: COMPLETED | OUTPATIENT
Start: 2025-02-18 | End: 2025-02-18

## 2025-02-18 RX ORDER — ACETAMINOPHEN 500 MG/5ML
975 LIQUID (ML) ORAL ONCE
Refills: 0 | Status: COMPLETED | OUTPATIENT
Start: 2025-02-18 | End: 2025-02-18

## 2025-02-18 RX ORDER — METHYLPREDNISOLONE ACETATE 80 MG/ML
8 INJECTION, SUSPENSION INTRA-ARTICULAR; INTRALESIONAL; INTRAMUSCULAR; SOFT TISSUE ONCE
Refills: 0 | Status: COMPLETED | OUTPATIENT
Start: 2025-02-18 | End: 2025-02-18

## 2025-02-18 RX ORDER — IBUPROFEN 200 MG
400 TABLET ORAL EVERY 6 HOURS
Refills: 0 | Status: DISCONTINUED | OUTPATIENT
Start: 2025-02-18 | End: 2025-02-19

## 2025-02-18 RX ORDER — DIAZEPAM 2 MG/1
5 TABLET ORAL ONCE
Refills: 0 | Status: DISCONTINUED | OUTPATIENT
Start: 2025-02-18 | End: 2025-02-18

## 2025-02-18 RX ORDER — KETOROLAC TROMETHAMINE 30 MG/ML
15 INJECTION, SOLUTION INTRAMUSCULAR; INTRAVENOUS ONCE
Refills: 0 | Status: DISCONTINUED | OUTPATIENT
Start: 2025-02-18 | End: 2025-02-18

## 2025-02-18 RX ORDER — CYCLOBENZAPRINE HYDROCHLORIDE 15 MG/1
5 CAPSULE, EXTENDED RELEASE ORAL THREE TIMES A DAY
Refills: 0 | Status: DISCONTINUED | OUTPATIENT
Start: 2025-02-18 | End: 2025-02-18

## 2025-02-18 RX ORDER — METHOCARBAMOL 500 MG/1
500 TABLET, FILM COATED ORAL EVERY 6 HOURS
Refills: 0 | Status: DISCONTINUED | OUTPATIENT
Start: 2025-02-18 | End: 2025-02-21

## 2025-02-18 RX ORDER — GABAPENTIN 400 MG/1
100 CAPSULE ORAL
Refills: 0 | Status: DISCONTINUED | OUTPATIENT
Start: 2025-02-18 | End: 2025-02-21

## 2025-02-18 RX ORDER — METHYLPREDNISOLONE ACETATE 80 MG/ML
4 INJECTION, SUSPENSION INTRA-ARTICULAR; INTRALESIONAL; INTRAMUSCULAR; SOFT TISSUE ONCE
Refills: 0 | Status: COMPLETED | OUTPATIENT
Start: 2025-02-19 | End: 2025-02-19

## 2025-02-18 RX ORDER — KETOROLAC TROMETHAMINE 30 MG/ML
30 INJECTION, SOLUTION INTRAMUSCULAR; INTRAVENOUS ONCE
Refills: 0 | Status: DISCONTINUED | OUTPATIENT
Start: 2025-02-18 | End: 2025-02-18

## 2025-02-18 RX ORDER — HYDROMORPHONE/SOD CHLOR,ISO/PF 2 MG/10 ML
0.5 SYRINGE (ML) INJECTION EVERY 6 HOURS
Refills: 0 | Status: DISCONTINUED | OUTPATIENT
Start: 2025-02-18 | End: 2025-02-19

## 2025-02-18 RX ORDER — ACETAMINOPHEN 500 MG/5ML
650 LIQUID (ML) ORAL EVERY 6 HOURS
Refills: 0 | Status: DISCONTINUED | OUTPATIENT
Start: 2025-02-18 | End: 2025-02-21

## 2025-02-18 RX ADMIN — Medication 400 MILLIGRAM(S): at 22:26

## 2025-02-18 RX ADMIN — LIDOCAINE HYDROCHLORIDE 1 PATCH: 20 JELLY TOPICAL at 05:46

## 2025-02-18 RX ADMIN — Medication 975 MILLIGRAM(S): at 05:46

## 2025-02-18 RX ADMIN — Medication 400 MILLIGRAM(S): at 20:49

## 2025-02-18 RX ADMIN — GABAPENTIN 100 MILLIGRAM(S): 400 CAPSULE ORAL at 12:32

## 2025-02-18 RX ADMIN — METHOCARBAMOL 500 MILLIGRAM(S): 500 TABLET, FILM COATED ORAL at 13:56

## 2025-02-18 RX ADMIN — METHYLPREDNISOLONE ACETATE 8 MILLIGRAM(S): 80 INJECTION, SUSPENSION INTRA-ARTICULAR; INTRALESIONAL; INTRAMUSCULAR; SOFT TISSUE at 12:32

## 2025-02-18 RX ADMIN — KETOROLAC TROMETHAMINE 30 MILLIGRAM(S): 30 INJECTION, SOLUTION INTRAMUSCULAR; INTRAVENOUS at 05:46

## 2025-02-18 RX ADMIN — Medication 0.5 MILLIGRAM(S): at 11:43

## 2025-02-18 RX ADMIN — Medication 0.5 MILLIGRAM(S): at 22:26

## 2025-02-18 RX ADMIN — Medication 0.5 MILLIGRAM(S): at 07:22

## 2025-02-18 RX ADMIN — Medication 400 MILLIGRAM(S): at 14:09

## 2025-02-18 RX ADMIN — Medication 0.5 MILLIGRAM(S): at 15:17

## 2025-02-18 RX ADMIN — METHOCARBAMOL 500 MILLIGRAM(S): 500 TABLET, FILM COATED ORAL at 18:28

## 2025-02-18 RX ADMIN — DIAZEPAM 5 MILLIGRAM(S): 2 TABLET ORAL at 05:46

## 2025-02-18 RX ADMIN — LIDOCAINE HYDROCHLORIDE 1 PATCH: 20 JELLY TOPICAL at 14:59

## 2025-02-18 RX ADMIN — Medication 0.5 MILLIGRAM(S): at 20:35

## 2025-02-18 RX ADMIN — Medication 0.5 MILLIGRAM(S): at 18:38

## 2025-02-18 NOTE — ED PROVIDER NOTE - PROGRESS NOTE DETAILS
Nii Pickett, ED Attending: Patient still in pain after trial of IM and p.o. meds.  Will place IV and plan for IV medications.  Will send basic labs, patient may need to stay in CDU for MRI of the spine, reassess to dispo.

## 2025-02-18 NOTE — H&P ADULT - NSHPPHYSICALEXAM_GEN_ALL_CORE
Vital Signs Last 24 Hrs  T(C): 36.7 (18 Feb 2025 08:43), Max: 36.7 (18 Feb 2025 08:43)  T(F): 98 (18 Feb 2025 08:43), Max: 98 (18 Feb 2025 08:43)  HR: 80 (18 Feb 2025 08:43) (70 - 80)  BP: 150/98 (18 Feb 2025 08:43) (142/87 - 150/98)  BP(mean): --  RR: 17 (18 Feb 2025 08:43) (16 - 17)  SpO2: 100% (18 Feb 2025 08:43) (98% - 100%)    Parameters below as of 18 Feb 2025 08:43  Patient On (Oxygen Delivery Method): room air        CONSTITUTIONAL: NAD, well-developed, well-groomed  EYES: PERRLA; conjunctiva and sclera clear  ENMT: Moist oral mucosa, no pharyngeal injection or exudates; normal dentition  NECK: Supple, no palpable masses; no thyromegaly  RESPIRATORY: Normal respiratory effort; lungs are clear to auscultation bilaterally  CARDIOVASCULAR: Regular rate and rhythm, normal S1 and S2, no murmur/rub/gallop; No lower extremity edema; Peripheral pulses are 2+ bilaterally  ABDOMEN: Nontender to palpation, normoactive bowel sounds, no rebound/guarding; No hepatosplenomegaly  MUSCULOSKELETAL:   no clubbing or cyanosis of digits; no joint swelling or tenderness to palpation  PSYCH: A+O to person, place, and time; affect appropriate  NEUROLOGY: CN 2-12 are intact and symmetric; no gross sensory deficits   SKIN: No rashes; no palpable lesions Vital Signs Last 24 Hrs  T(C): 36.7 (18 Feb 2025 08:43), Max: 36.7 (18 Feb 2025 08:43)  T(F): 98 (18 Feb 2025 08:43), Max: 98 (18 Feb 2025 08:43)  HR: 80 (18 Feb 2025 08:43) (70 - 80)  BP: 150/98 (18 Feb 2025 08:43) (142/87 - 150/98)  BP(mean): --  RR: 17 (18 Feb 2025 08:43) (16 - 17)  SpO2: 100% (18 Feb 2025 08:43) (98% - 100%)    Parameters below as of 18 Feb 2025 08:43  Patient On (Oxygen Delivery Method): room air        CONSTITUTIONAL: mild distress, well-developed, well-groomed  EYES: PERRLA; conjunctiva and sclera clear  ENMT: Moist oral mucosa,  NECK: Supple, no palpable masses  RESPIRATORY: Normal respiratory effort; lungs are clear to auscultation bilaterally, no wheezes or rales   CARDIOVASCULAR: Regular rate and rhythm, normal S1 and S2, no murmur/rub/gallop;  ABDOMEN: Nontender to palpation, normoactive bowel sounds, no rebound/guarding;  MUSCULOSKELETAL: no clubbing or cyanosis of digits; LLE: 5/5 strength, RLE: 3/5 strength limited ROM 2/2 pain, Right gluteal TTP, no spinal tenderness   PSYCH: A+O to person, place, and time; affect appropriate  NEUROLOGY: CN 2-12 are intact and symmetric; no gross sensory deficits   SKIN: No rashes; no palpable lesions

## 2025-02-18 NOTE — PATIENT PROFILE ADULT - MST SCORE
PC:Shortness of breath     Belkofski:76-year-old female with past medical history detailed below.  Patient presented to the ED with acute respiratory distress.  Patient had a pulmonology appointment this morning and a CT scan of chest was performed.  This afternoon patient had an acute onset of shortness of breath.  Patient was found to be hypoxic by EMS in the 70s.  They added CPAP and got her sats to the upper 70s.  In the ED patient was experiencing work of breathing so she was intubated.  Currently intubated and sedated.  Discussed with , this has happened before.     Interval history:    - pt was on BIPAP overnight, currently not tolerating chest PT   - Pt is on nasal cannula       Past Medical History:   Diagnosis Date   • Acute kidney injury (CMD)    • Allergy    • Anxiety    • Arthritis    • Asthma    • Bilateral cataracts    • Chronic diastolic congestive heart failure (CMD) 2022   • Community acquired pneumonia of left lower lobe of lung 2019   • COPD (chronic obstructive pulmonary disease) (CMD)    • Depression    • Essential (primary) hypertension    • Fracture pelvic fracture 2016    slip and fall on tile   • Obesity 2019   • Osteoporosis    • RAD (reactive airway disease)    • Sepsis (CMD) 2019   • Wrist fracture, right        Past Surgical History:   Procedure Laterality Date   • Bronchoscopy  2017    Dr. Han   • Bronchoscopy  2019    Dr. Han   • Bronchoscopy  2020    Dr. Han   • Cataract extraction w/  intraocular lens implant Right 10/30/2018    Dr. Gill   • Cataract extraction w/  intraocular lens implant Left 2018    Dr. Gill   • Eye surgery      cataract surgery both eye.    • Fracture surgery Right     Wrist    • Skin biopsy         Family History   Problem Relation Age of Onset   • Other Mother          at 95   • Cancer Father          at 67 from lymphoma        Social History     Socioeconomic History   • Marital  status: /Civil Union     Spouse name: Not on file   • Number of children: Not on file   • Years of education: Not on file   • Highest education level: Not on file   Occupational History   • Occupation: retired.    Tobacco Use   • Smoking status: Former     Current packs/day: 0.00     Average packs/day: 1 pack/day for 30.0 years (30.0 ttl pk-yrs)     Types: Cigarettes     Start date: 10/24/1972     Quit date: 10/24/2002     Years since quittin.8   • Smokeless tobacco: Never   Vaping Use   • Vaping Use: never used   Substance and Sexual Activity   • Alcohol use: Yes     Alcohol/week: 14.0 - 21.0 standard drinks of alcohol     Types: 14 - 21 Standard drinks or equivalent per week     Comment: 2-3 drinks a day   • Drug use: No   • Sexual activity: Not on file   Other Topics Concern   • Not on file   Social History Narrative    Lives with spouse     Social Determinants of Health     Financial Resource Strain: Low Risk  (2023)    Financial Resource Strain    • Social Determinants: Financial Resource Strain: None   Food Insecurity: No Food Insecurity (2023)    Food Insecurity    • Social Determinants: Food Insecurity: Never   Transportation Needs: No Transportation Needs (2023)    PRAPARE - Transportation    • Lack of Transportation (Medical): No    • Lack of Transportation (Non-Medical): No   Physical Activity: Not on file   Stress: Not on file   Social Connections: Socially Integrated (2023)    Social Connections    • Social Determinants: Social Connections: 5 or more times a week   Intimate Partner Violence: Not At Risk (2023)    Intimate Partner Violence    • Social Determinants: Intimate Partner Violence Past Fear: No    • Social Determinants: Intimate Partner Violence Current Fear: No       Eye Problem(s):negative  ENT Problem(s):negative  Cardiovascular problem(s):as above  Respiratory problem(s):as above  Gastro-intestinal problem(s):negative GI  Genito-urinary  problem(s):negative  Musculoskeletal problem(s):negative  Integumentary problem(s):negative  Neurological problem(s):negative  Psychiatric problem(s):negative  Endocrine problem(s):negative  Hematologic and/or Lymphatic problem(s):negative    Current Facility-Administered Medications   Medication   • magnesium oxide (MAG-OX) tablet 400 mg   • magnesium oxide (MAG-OX) tablet 400 mg   • potassium CHLORIDE (KLOR-CON M) altagracia ER tablet 40 mEq   • ampicillin-sulbactam (UNASYN) 1.5 g in sodium chloride 0.9 % 100 mL IVPB   • ipratropium-albuterol (DUONEB) 0.5-2.5 (3) MG/3ML nebulizer solution 3 mL   • budesonide (PULMICORT) nebulizer suspension 0.5 mg   • montelukast (SINGULAIR) tablet 10 mg   • polyethylene glycol (MIRALAX) packet 17 g   • potassium CHLORIDE (KLOR-CON) packet 10 mEq   • losartan (COZAAR) tablet 25 mg   • ALPRAZolam (XANAX) tablet 0.5 mg   • amLODIPine (NORVASC) tablet 10 mg   • calcium carbonate-vitamin D (CALTRATE+D) 600-10 MG-MCG tablet 1 tablet   • citalopram (CeleXA) tablet 40 mg   • oxybutynin (DITROPAN) tablet 2.5 mg   • guaiFENesin 100 MG/5ML solution 200 mg   • sodium chloride 0.9 % flush bag 25 mL   • sodium chloride (PF) 0.9 % injection 2 mL   • azithromycin (ZITHROMAX) 500 mg in sodium chloride 0.9 % 250 mL IVPB   • hydrALAZINE (APRESOLINE) injection 10 mg   • latanoprost (XALATAN) 0.005 % ophthalmic solution 1 drop   • albuterol inhaler 2 puff   • sodium chloride (NORMAL SALINE) 0.9 % bolus 500 mL   • sodium chloride 0.9 % flush bag 25 mL   • heparin (porcine) injection 5,000 Units   • famotidine (PEPCID) injection 20 mg   • dexMEDEtomidine (PRECEDEX) 400 mcg/100 mL in sodium chloride 0.9 % infusion   • Potassium Standard Replacement Protocol (Levels 3.5 and lower)   • Magnesium Standard Replacement Protocol   • ondansetron (ZOFRAN) injection 4 mg   • acetaminophen (TYLENOL) tablet 650 mg   • HYDROcodone-acetaminophen (NORCO) 5-325 MG per tablet 1 tablet   • bisacodyl (DULCOLAX) suppository 10  mg   • methylPREDNISolone (SOLU-Medrol) injection 125 mg       O/E:  Visit Vitals  BP (!) 142/67   Pulse 81   Temp 97.4 °F (36.3 °C) (Temporal)   Resp (!) 24   Ht 5' 1\" (1.549 m)   Wt 66.4 kg (146 lb 6.2 oz)   SpO2 94%   BMI 27.66 kg/m²       Examination of the patient reveals:     GENERAL: alert, is in no apparent distress and is well developed and well nourished  LYMPH NODES: no cervical adenopathy, no supraclavicular adenopathy, no axillary adenopathy and no inguinal adenopathy  SKIN normal color, normal texture, normal turgor, no skin rashes, no atypical appearing skin lesions and no bruises  HEAD: normocephalic  EYES: pupils are equal and reactive to light and accomodation extraocular movements are full sclera and conjunctiva are normal lids and lashes are normal  EARS: pinna and external ear is normal bilaterally, external auditory canals are normal and auditory acuity is grossly normal  NOSE: external nose is normal to inspection and no septal deviation  MOUTH/THROAT: tongue is midline and appears normal, oropharynx appears normal, soft palate and uvula are normal and oral mucosa is normal  NECK: neck is supple, no thyromegaly, no anterior cervical adenopathy, no posterior cervical adenopathy and no supraclavicular adenopathy  CHEST: contour is normal with normal AP diameter, normal respiratory excursion and respiratory effort is not labored  LUNGS: ABNORMAL FINDINGS>> decreased bs in right lung basse  HEART: normal PMI, normal rate and rhythm, no palpable heaves or thrills, S1 and S2 normal, no S3 or S4, no murmurs and no extra heart sounds  ABDOMEN: abdomen is soft, normal active bowel sounds, nontender, without masses, without hepatomegaly, without splenomegaly and no pulsatile masses  BACK: inspection shows no curvature, no discomfort to palpation of the midline and no costovertebral angle discomfort to palpation  NEUROLOGIC: cranial nerves 2 through 12 normal, motor strength normal, gait and station  normal, coordination normal, DTR's normal and symmetric and no tremor noted  EXTREMITIES: no clubbing, no cyanosis and no edema    WBC (K/mcL)   Date Value   08/28/2023 9.0     RBC (mil/mcL)   Date Value   08/28/2023 3.23 (L)     HCT (%)   Date Value   08/28/2023 32.5 (L)     HGB (g/dL)   Date Value   08/28/2023 10.1 (L)     PLT (K/mcL)   Date Value   08/28/2023 294       Sodium (mmol/L)   Date Value   08/28/2023 139     Potassium (mmol/L)   Date Value   08/28/2023 3.5     Chloride (mmol/L)   Date Value   08/28/2023 99     Glucose (mg/dL)   Date Value   08/28/2023 212 (H)     Calcium (mg/dL)   Date Value   08/28/2023 8.1 (L)     Carbon Dioxide (mmol/L)   Date Value   08/28/2023 28     BUN (mg/dL)   Date Value   08/28/2023 26 (H)     Creatinine (mg/dL)   Date Value   08/28/2023 0.79       CXR reviewed by myself personally    Assessment/Plan:  1. Neuro: extubated , off sedation   2. CVS: Hemodynamically stable. Elevated bnp , acute on chronic disatolic heart failure, gentle diuresis as tolerated  3. Resp: acute hypoxemic resp failure,right lung pneumonia, right lung mucus plugging ,    Ceftriaxone/azithro  steroids/nebs  Mucomyst nebs/chest percussion  S/p bronchoscopy 8/24  Extubated 8/25   Cont BIPAP PRN   Having difficult time tolerating chest PT   4. Renal: monitor BUN/Cr and UO. Gentle hydration  5. GI:  ng to LIS , PPI IV  6. Endo: ISS/Accuckecks per ICU protocol  7. Proph: SCD, Protonix,heparin sq     Thank you for involving us in the patient care. Please do not hesitate to contact with any questions.     Note is dictated utilizing voice recognition software. This may lead to unintentional typographical errors.     Aubree Drew MD  Pulmonary and Critical Care Medicine  Pager [phone]      Critical Care Time (in minutes): 35  Critical Care Time did not overlap with other physicians or include procedures. During this time, the patient was managed for above conditions and deemed to be at high risk of  deterioration, organ failure, or death.       0

## 2025-02-18 NOTE — ED ADULT NURSE NOTE - NSFALLRISKINTERV_ED_ALL_ED
Assistance OOB with selected safe patient handling equipment if applicable/Assistance with ambulation/Communicate fall risk and risk factors to all staff, patient, and family/Monitor gait and stability/Provide visual cue: yellow wristband, yellow gown, etc/Reinforce activity limits and safety measures with patient and family/Call bell, personal items and telephone in reach/Instruct patient to call for assistance before getting out of bed/chair/stretcher/Non-slip footwear applied when patient is off stretcher/[place] to call system/Physically safe environment - no spills, clutter or unnecessary equipment/Purposeful Proactive Rounding/Room/bathroom lighting operational, light cord in reach

## 2025-02-18 NOTE — H&P ADULT - ASSESSMENT
34 year old F with hx f chronic back pain 2/2 L4-S1 disc herniation (per patient approximately 80% of the disc was bulging on MRI last year) presents for low back and right lower extremity pain

## 2025-02-18 NOTE — PATIENT PROFILE ADULT - FALL HARM RISK - UNIVERSAL INTERVENTIONS
Bed in lowest position, wheels locked, appropriate side rails in place/Call bell, personal items and telephone in reach/Instruct patient to call for assistance before getting out of bed or chair/Non-slip footwear when patient is out of bed/[place] to call system/Physically safe environment - no spills, clutter or unnecessary equipment/Purposeful Proactive Rounding/Room/bathroom lighting operational, light cord in reach

## 2025-02-18 NOTE — H&P ADULT - NSHPSOCIALHISTORY_GEN_ALL_CORE
Patient denies any history of smoking cigarettes, alcohol, or recreational drug use. 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

## 2025-02-18 NOTE — H&P ADULT - NSICDXFAMILYHX_GEN_ALL_CORE_FT
FAMILY HISTORY:  Father  Still living? Unknown  FH: heart disease, Age at diagnosis: Age Unknown  FH: lymphoma, Age at diagnosis: Age Unknown

## 2025-02-18 NOTE — H&P ADULT - PROBLEM SELECTOR PLAN 1
with right gluteal pain and  severe cramping pain  down to hamstrings  feels like "charley horse" patient has a history of chronic back pain  L4-S1 disc herniation (per patient approximately 80% of the disc was bulging on MRI last year)  had epidural  November 2024 c/b with right dull gluteal pain   follows with pain management  Dr. Donny Carrillo.  and severe cramping pain down to hamstrings  feels like "charley horse"  she started taking medrol adilene on 2/14  -start  Flexeril 5mg TID  -start gabapentin 100 TID, titrate as tolerated  -Dilaudid 0.5mg IV for severe pain patient has a history of chronic back pain  L4-S1 disc herniation (per patient approximately 80% of the disc was bulging on MRI last year)  had epidural  November 2024 c/b with right dull gluteal pain   follows with pain management  Dr. Donny Carrillo.  and severe cramping pain down to hamstrings  feels like "charley horse"  she started taking medrol adilene on 2/14  -start  Flexeril 5mg TID  -start gabapentin 100 TID, titrate as tolerated  -Dilaudid 0.5mg IV for severe pain  -heat pads  -patient is due for MRI outpatient, will order but should not hold up discharge if pain I controlled patient has a history of chronic back pain  L4-S1 disc herniation (per patient approximately 80% of the disc was bulging on MRI last year)  had epidural  November 2024 c/b with right dull gluteal pain   follows with pain management  Dr. Donny Carrillo.  and severe cramping pain down to hamstrings  feels like "charley horse"  she started taking medrol adilene on 2/14  s/p valium, ketorolac, IV Tylenol and Dilaudid in the ED  -start  Flexeril 5mg TID  -start gabapentin 100 TID, starting at low dose because patient is concerned about side effects titrate as tolerated  -Dilaudid 0.5mg IV for severe pain  -heat pads  -patient is due for MRI outpatient, will order but should not hold up discharge if pain I controlled patient has a history of chronic back pain  L4-S1 disc herniation (per patient approximately 80% of the disc was bulging on MRI last year)  had epidural  November 2024 c/b with right dull gluteal pain   follows with pain management  Dr. Donny Carrillo.  and severe cramping pain down to hamstrings  feels like "charley horse"  she started taking medrol adilene on 2/14  s/p valium, ketorolac, IV Tylenol and Dilaudid in the ED  -start  methocarbamol 500mg q 6   -start gabapentin 100 TID, starting at low dose because patient is concerned about side effects titrate as tolerated  -Dilaudid 0.5mg IV for severe pain  -heat pads  -patient is due for MRI outpatient, will order but should not hold up discharge if pain I controlled  -lidocaine patch   -monitor jeyson patient has a history of chronic back pain  L4-S1 disc herniation (per patient approximately 80% of the disc was bulging on MRI last year)  had epidural  November 2024 c/b with right dull gluteal pain   follows with pain management  Dr. Donny Carrillo.  and severe cramping pain down to hamstrings  feels like "charley horse"  she started taking medrol adilene on 2/14  s/p valium, ketorolac, IV Tylenol and Dilaudid in the ED  -start  methocarbamol 500mg q 6   -start gabapentin 100 TID, starting at low dose because patient is concerned about side effects titrate as tolerated  -start motrin 400 q 6 standing for 2 days   -Dilaudid 0.5mg IV for severe pain  -heat pads  -patient is due for MRI outpatient, will order but should not hold up discharge if pain I controlled  -lidocaine patch   -monitor jeyson patient has a history of chronic back pain  L4-S1 disc herniation (per patient approximately 80% of the disc was bulging on MRI last year)  had epidural  November 2024 c/b with right dull gluteal pain   follows with pain management  Dr. Donny Carrillo.  and severe cramping pain down to hamstrings  feels like "charley horse"  she started taking medrol adilene on 2/14  s/p valium, ketorolac, IV Tylenol and Dilaudid in the ED  -start  methocarbamol 500mg q 6   -start gabapentin 100 TID, starting at low dose because patient is concerned about side effects titrate as tolerated  -start Motrin 400 q 6 standing for 2 days   -Dilaudid 0.5mg IV for severe pain  -heat pads  -lidocaine patch   -monitor lytes  -patient is due for MRI outpatient this Friday to assess lower back, will order but should not hold up discharge if pain is controlled patient has a history of chronic back pain  L4-S1 disc herniation (per patient approximately 80% of the disc was bulging on MRI last year)  had epidural  November 2024 c/b with right dull gluteal pain   follows with pain management  Dr. Donny Carrillo.  and severe cramping pain down to hamstrings  feels like "charley horse"  she started taking medrol adilene on 2/14  s/p valium, ketorolac, IV Tylenol and Dilaudid in the ED  oxycodone na ibuprofen di not work at home   pain most likely 2/2 muscle pain   -start  methocarbamol 500mg q 6   -start gabapentin 100 TID, starting at low dose because patient is concerned about side effects titrate as tolerated  -start Motrin 400 q 6 standing for 2 days   -Dilaudid 0.5mg IV for severe pain  -heat pads  -lidocaine patch   -monitor lytes  -patient is due for MRI outpatient this Friday to assess lower back, will order but should not hold up discharge if pain is controlled patient has a history of chronic back pain  L4-S1 disc herniation (per patient approximately 80% of the disc was bulging on MRI last year)  had epidural  November 2024 c/b with right dull gluteal pain   follows with pain management  Dr. Donny Carrillo.  and severe cramping pain down to hamstrings  feels like "charley horse"  she started taking medrol adilene on 2/14  s/p valium, ketorolac, IV Tylenol and Dilaudid in the ED  percocet and  ibuprofen did not work at home   pain most likely 2/2 muscle pain   -start  methocarbamol 500mg q 6   -start gabapentin 100 TID, starting at low dose because patient is concerned about side effects titrate as tolerated  -start Motrin 400 q 6 standing for 2 days   -Dilaudid 0.5mg IV for severe pain  -heat pads  -lidocaine patch   -monitor lytes  -patient is due for MRI outpatient this Friday to assess lower back, will order but should not hold up discharge if pain is controlled patient has a history of chronic back pain  L4-S1 disc herniation (per patient approximately 80% of the disc was bulging on MRI last year)  had epidural  November 2024 c/b with right dull gluteal pain   follows with pain management  Dr. Donny Carrillo.  and severe cramping pain down to hamstrings  feels like "charley horse"  she started taking medrol adilene on 2/14  s/p valium, ketorolac, IV Tylenol and Dilaudid in the ED  percocet and  ibuprofen did not work at home   pain most likely 2/2 muscle pain   -start  methocarbamol 500mg q 6   -start gabapentin 100 TID, starting at low dose because patient is concerned about side effects titrate as tolerated  -start Motrin 400 q 6 standing for 2 days   -Dilaudid 0.5mg IV for severe pain  -heat packs  -lidocaine patch   -monitor lytes  -patient is due for MRI outpatient this Friday to assess lower back, will order but should not hold up discharge if pain is controlled patient has a history of chronic back pain  L4-S1 disc herniation (per patient approximately 80% of the disc was bulging on MRI last year)  had epidural  November 2024 c/b with right dull gluteal pain   follows with pain management  Dr. Donny Carrillo.  and severe cramping pain down to hamstrings  feels like "charley horse"  she started taking medrol adilene on 2/14  s/p valium, ketorolac, IV Tylenol and Dilaudid in the ED  percocet and  ibuprofen did not work at home   pain most likely 2/2 muscle pain vs radiculopathy   -start  methocarbamol 500mg q 6   -start gabapentin 100 TID, starting at low dose because patient is concerned about side effects titrate as tolerated  -start Motrin 400 q 6 standing for 2 days   -Dilaudid 0.5mg IV for severe pain  -heat packs  -lidocaine patch   -monitor lytes  -patient is due for MRI outpatient this Friday to assess lower back, will order but should not hold up discharge if pain is controlled

## 2025-02-18 NOTE — H&P ADULT - NSHPLABSRESULTS_GEN_ALL_CORE
LABS:                         13.2   14.84 )-----------( 353      ( 18 Feb 2025 07:08 )             39.5     02-18    137  |  101  |  11  ----------------------------<  121[H]  3.9   |  25  |  0.66    Ca    9.0      18 Feb 2025 07:08    TPro  6.8  /  Alb  4.0  /  TBili  0.3  /  DBili  x   /  AST  12  /  ALT  31  /  AlkPhos  117  02-18      Urinalysis Basic - ( 18 Feb 2025 07:08 )    Color: x / Appearance: x / SG: x / pH: x  Gluc: 121 mg/dL / Ketone: x  / Bili: x / Urobili: x   Blood: x / Protein: x / Nitrite: x   Leuk Esterase: x / RBC: x / WBC x   Sq Epi: x / Non Sq Epi: x / Bacteria: x            RADIOLOGY, EKG & ADDITIONAL TESTS: Reviewed.

## 2025-02-18 NOTE — ED ADULT TRIAGE NOTE - CHIEF COMPLAINT QUOTE
Pt c/o lower back pain radiating down her right leg x1 day. Pt has chronic lower back pain but thinks she is having a sciatica flare up. Received steroid and pain med injection by PCP this AM without releif. Pt appears uncomfortable and is tearful in triage. Denies SOB, chest pain, incontinence, numbness. Hx: Atrial septal defect

## 2025-02-18 NOTE — H&P ADULT - HISTORY OF PRESENT ILLNESS
34 year old F with hx f chronic back pain 2/2 L4-S1 disc herniation (per patient approximately 80% of the disc was bulging on MRI last year) presents for low back and right lower extremity pain. Patient states that since she had an epidural in Novemeber 2024 she has had chronic dull pain in her right buttock region and hamstring. She was following with her pain management doctor Dr. Donny Carrillo. She had been prescribed oxycodone, gabapentin and Medrol Du for the pain by her pain specialist however she held off taking it because she does not like to take medications regularly as she is concerned for side effects. She started taking the Medrol du about 5 days. About 2 days ago she developed a severe cramping pain that feels like a [place] horse in her right gluteal region down to her right hamstring muscle. She all endorses nerve pain in the region. She states that the pain comes and goes. It is exacerbate by movement. She tried taking oxycodone and ibuprofen and that did not provide any relief. She saw a different pain specialist yesterday who gave her a trigger point injection and anti inflammatory shot that did not help. The pain is so severe that she cannot walk. She endorses mild numbness and weakness due to pain in her right foot. Denies fevers, chill, nausea, vomiting, and bowel or urinary incontinence.    34 year old F with hx f chronic back pain 2/2 L4-S1 disc herniation (per patient approximately 80% of the disc was bulging on MRI last year) presents for low back and right lower extremity pain. Patient states that since she had an epidural in November 2024 she has had chronic dull pain in her right buttock region and hamstring. She was following with her pain management doctor Dr. Donny Carrillo. She had been prescribed oxycodone, gabapentin and Medrol Du for the pain by her pain specialist however she held off taking it because she does not like to take medications regularly as she is concerned for side effects. She started taking the Medrol du about 5 days. About 2 days ago she developed a severe cramping pain that feels like a "charley horse" in her right gluteal region down to her right hamstring muscle. She all endorses nerve pain in the region. She states that the pain comes and goes. It is exacerbate by movement. She tried taking oxycodone and ibuprofen and that did not provide any relief. She saw a different pain specialist yesterday who gave her a trigger point injection and anti inflammatory shot that did not help. The pain is so severe that she cannot walk. She endorses mild numbness and weakness due to pain in her right foot. Denies fevers, chill, nausea, vomiting, and bowel or urinary incontinence.    34 year old F with hx f chronic back pain 2/2 L4-S1 disc herniation (per patient approximately 80% of the disc was bulging on MRI last year) presents for low back and right lower extremity pain. Patient states that since she had an epidural in November 2024 she has had chronic dull pain in her right buttock region and hamstring. She was following with her pain management doctor Dr. Donny Carrillo. She had been prescribed oxycodone, gabapentin and Medrol Du for the pain by her pain specialist however she held off taking it because she does not like to take medications regularly as she is concerned for side effects. She started taking the Medrol du about 5 days. About 2 days ago she developed a severe cramping pain that feels like a "charley horse" in her right gluteal region down to her right hamstring muscle. She states that she feels like the pain is traveling from her back and down her right leg. She all endorses nerve pain in the region. She states that the pain comes and goes. It is exacerbate by movement. She tried taking oxycodone and ibuprofen and that did not provide any relief. She saw a different pain specialist yesterday who gave her a trigger point injection and anti inflammatory shot that did not help. The pain is so severe that she cannot walk. She endorses mild numbness and weakness due to pain in her right foot. Denies fevers, chill, nausea, vomiting, and bowel or urinary incontinence.    34 year old F with hx f chronic back pain 2/2 L4-S1 disc herniation (per patient approximately 80% of the disc was bulging on MRI last year) presents for low back and right lower extremity pain. Patient states that since she had an epidural in November 2024 she has had chronic dull pain in her right buttock region and hamstring. She was following with her pain management doctor Dr. Donny Carrillo. She had been prescribed percocet, gabapentin and Medrol Du for the pain by her pain specialist however she held off taking it because she does not like to take medications regularly as she is concerned for side effects. She started taking the Medrol du about 5 days. About 2 days ago she developed a severe cramping pain that feels like a "charley horse" in her right gluteal region down to her right hamstring muscle. She states that she feels like the pain is traveling from her back and down her right leg. She all endorses nerve pain in the region. She states that the pain comes and goes. It is exacerbate by movement. She tried taking oxycodone and ibuprofen and that did not provide any relief. She saw a different pain specialist yesterday who gave her a trigger point injection and anti inflammatory shot that did not help. The pain is so severe that she cannot walk. She endorses mild numbness and weakness due to pain in her right foot. Denies fevers, chill, nausea, vomiting, and bowel or urinary incontinence.

## 2025-02-18 NOTE — H&P ADULT - NSHPREVIEWOFSYSTEMS_GEN_ALL_CORE
REVIEW OF SYSTEMS:  CONSTITUTIONAL: + weakness, No fevers, chills, sick contacts, or unintended weight loss  EYES: No visual changes or vertigo  ENT: No throat pain, rhinorrhea, or hearing loss   NECK: No pain or stiffness  RESPIRATORY: No cough, wheezing, hemoptysis; No shortness of breath  CARDIOVASCULAR: No chest pain or palpitations  GASTROINTESTINAL: No abdominal or epigastric pain. No nausea, vomiting, or hematemesis; No diarrhea or constipation. No melena or hematochezia.  GENITOURINARY: No dysuria, frequency or hematuria  NEUROLOGICAL: +numbness of right leg, +RLE pain, +back pain   SKIN: No itching, rashes, or bruises  Psych: Good mood, no substance use

## 2025-02-18 NOTE — ED ADULT NURSE NOTE - SUICIDE SCREENING QUESTION 2
Discharge Instructions  COVID-19    COVID-19 is a viral illness that spreads from person-to-person primarily by droplets when an infected person coughs or sneezes and the droplets are then breathed in by another person.    Symptoms of COVID-19  Many people have no symptoms or mild symptoms.  Symptoms usually appear within a few days after contact with a person with COVID-19.  A mild COVID-19 illness is like a cold and can have fever, cough, sneezing, sore throat, tiredness, headache, and muscle pain. Some patients also have stomach symptoms like nausea, vomiting, or diarrhea.  A moderate COVID-19 illness might include shortness of breath or pneumonia on a chest x-ray.  A severe COVID-19 illness causes significant breathing problems such as low oxygen levels or more serious pneumonia.  Some patients experience loss of taste or smell which is somewhat unique to COVID-19.    What should I do if I test positive?  If you test positive for COVID and have no symptoms, you should take precautions, as described below, for five days.  If you test positive for COVID and have symptoms, you should stay home and away from others. You can go back to normal activities when you are feeling better and have been without a fever (without using medications to treat the fever) for 24 hours. When you return to normal activities you should take precautions, as described below, for five days.  Precautions to prevent the spread of COVID-19  Clean Air. Viruses spread from person to person in the air. Bring fresh air into your home by opening doors or windows or using exhaust fans if possible. Use an air filter. Be outdoors when possible.  Practice good hygiene. Cover your mouth and nose with a tissue when you cough or sneeze. Wash your hands often with soap and water for at least 20 seconds or use an       alcohol-based hand  containing at least 60% alcohol. Avoid touching your face. Clean surfaces such as countertops, handrails,  and doorknobs.  Wear a facemask. Masks both prevent an infected person from spreading the virus and prevent a well person from getting the virus. Cloth masks are good, surgical/disposable masks are better, and respirators (N95) are the best.  Practice physical distancing. Avoid being close to someone with cough or other symptoms. Avoid crowded spaces.   What should I do for myself while I am sick?  Treat your symptoms. You can take Acetaminophen (Tylenol) to treat body aches and fever as needed for comfort. Ibuprofen (Advil or Motrin) can be used as well if you still have symptoms after taking Tylenol. Drink fluids. Rest.  Watch for worsening symptoms such as shortness of breath/difficulty breathing or very severe weakness.  Exercise/Sports in rare cases, COVID could affect your heart in a way that makes exercise or participation in sports dangerous.  If you have a mild COVID illness (fever, cough, sore throat, and similar symptoms but no difficulty breathing or abnormalities of the lung): After your COVID symptoms have resolved, you can return to exercise. If you develop difficulty breathing or chest discomfort with exercise, palpitations (a sensation of your heart racing or skipping), or lightheadedness/passing out, you should contact your doctor/clinic.  If you have more than a mild illness (meaning that you have problems with your breathing or lungs) or if you participate in competitive or strenuous activity or have a history of heart disease: Please see your primary doctor/provider prior to return to activity/competition.    COVID treatments such as antiviral medications are available. They are recommended for those patients who have a risk for developing more severe COVID illness. Age is the biggest risk factor. Risk is increased for adults greater than 50 years old and particularly for adults greater than 65 years. Importantly, the treatments must be started early in the illness (within five days). These  treatments may have been considered today during your visit. If you have other questions, contact your primary doctor/clinic.    You can learn more about COVID treatments from the [facility]:  [email or web address]            What should I do if I am exposed to COVID?  If you are exposed to COVID, you should monitor for symptoms and test if you develop symptoms. Practicing the precautions discussed above are a good idea, particularly if you plan to be around any person who is at higher risk of COVID complications such as older patients (>65) or people with significant medical problems.            Return to the Emergency Department if:  If you are developing worsening breathing, weakness, or feel worse you should seek medical attention.  If you are uncertain, contact your health care provider/clinic. If you need emergency medical attention, call [phone].   No

## 2025-02-18 NOTE — ED PROVIDER NOTE - NSICDXPASTMEDICALHX_GEN_ALL_CORE_FT
PAST MEDICAL HISTORY:  ASD (atrial septal defect) H/o  repair 2002    GDM (gestational diabetes mellitus) GDMA1 2014, GDMA2 2021    Hernia h/o  inguinal hernia repair 1997    History of termination of pregnancy x1    HTN (hypertension) h/o  1st pregnancy

## 2025-02-18 NOTE — ED PROVIDER NOTE - PHYSICAL EXAMINATION
GEN: A&Ox3. Non-toxic appearing.  HEENT: normocephalic, atraumatic, EOMI, PERRL  CARDIAC: RRR Radial pulses and DP pulses present and symmetric b/l  PULM: unlabored breathing room air  ABD: soft NT, ND, no rebound no guarding  MSK: No midline ttp, right lumbar paraspinal ttp, able to move extremities  NEURO: no focal neurological deficits, CN 2-12 grossly intact  SKIN: trigger point site c/d/i

## 2025-02-18 NOTE — ED ADULT NURSE NOTE - NSFALLASSISTNEEDED_ED_ALL_ED
Fwd to Prior auth team        Please advise on any updates on PA   Standing/Walking/Toileting/Moving from bed to stretcher

## 2025-02-18 NOTE — ED PROVIDER NOTE - IV ALTEPLASE INCLUSION HIDDEN
Spoke with the patient. Notified of 530 AM arrival time to the [facility], [facility]. Notified of negative COVID test. Informed of current visitor policy.  Reminded of NPO. Patient verbalized understanding to all.    Noemy Michaels MS, OT  Orthopedic Clinical Assistant to Dr. Ross Dunbar [facility]     show

## 2025-02-18 NOTE — ED PROVIDER NOTE - CLINICAL SUMMARY MEDICAL DECISION MAKING FREE TEXT BOX
34-year-old female history of chronic back pain, prior atrial septal defect, presents with 1 day of acute on chronic right lower back pain radiating down right leg intermittently to thigh and toes paresthesias.  States woke up with the pain, worsens with movement.  Received a trigger point injection today and anti-inflammatory shot with minimal relief.  Has had episodes like this in the past but much more severe today and now with radiation down the leg.  Received an epidural in November for the pain.  Was prescribed gabapentin but has not started the medication yet.  Denies fevers, falls, chest pain, shortness of breath, abdominal pain, dysuria, hematuria, urinary retention, fecal incontinence, weakness.    Afebrile, not tachycardic, no midline tenderness, right lumbar paraspinal tenderness with right gluteal tenderness.  Trigger point injection site with no erythema or fluctuance.  Neurovascularly intact extremities, able to range ankle and toes.  Likely sciatic flare.  No concern for cord compression or epidural abscess at this time. 34-year-old female history of chronic back pain, prior atrial septal defect, presents with 1 day of acute on chronic right lower back pain (L4-S1 up to 80% disc herniation) radiating down right leg intermittently to thigh and toes paresthesias.  States woke up with the pain, worsens with movement.  Received a trigger point injection today and anti-inflammatory shot with minimal relief.  Has had episodes like this in the past but much more severe today and now with radiation down the leg.  Received an epidural in November for the pain.  Was prescribed gabapentin but has not started the medication yet.  Was scheduled for an MRI friday. Denies fevers, falls, chest pain, shortness of breath, abdominal pain, dysuria, hematuria, urinary retention, fecal incontinence, weakness.    Afebrile, not tachycardic, no midline tenderness, right lumbar paraspinal tenderness with right gluteal tenderness.  Trigger point injection site with no erythema or fluctuance.  Neurovascularly intact extremities, able to range ankle and toes.  Likely sciatic flare.  No concern for cord compression or epidural abscess at this time.

## 2025-02-18 NOTE — ED ADULT TRIAGE NOTE - HEIGHT IN CM
Chan Walker RD Office: [phone]

## 2025-02-18 NOTE — ED PROVIDER NOTE - ATTENDING CONTRIBUTION TO CARE
I have personally performed a face to face medical and diagnostic evaluation of the patient. I have discussed with and reviewed the Resident's note and agree with the History, ROS, Physical Exam and MDM unless otherwise indicated. A brief summary of my personal evaluation and impression can be found below.    34-year-old female, past medical history of L4-S1 disc herniation, patient states that approximately 80% of the disc was bulging on the last MRI that was done approximately 1 year ago.  Presenting today for increasing right-sided lower extremity pain.  States that she got a trigger point injection just yesterday.  States that she was hoping would help with the pain but has not.  Denies any numbness, tingling, urinary or bowel incontinence.  No leg weakness, but ambulation limited secondary to pain.  No fevers, chills, nausea or vomiting.  Has followed up with 2 different spine specialist who recommended PT OT female with pain management deferred surgery.  has been following with a pain medicine doctor as well, has had epidurals with some improvement last 1 being in November.  On exam, vital signs stable, neurovascularly intact throughout bilateral lower remedies.  Tenderness to palpation over the right gluteal region.  No midline tenderness throughout patient uncomfortable appearing.  Plan for pain control, and will reassess.  If unable to control pain with p.o. IM meds, may need IV and possible CDU for MRI.  No concern.  Cord compression at this time given story and exam.  Will continue to monitor and reassess to dispo. Nii Pickett, ED Attending

## 2025-02-18 NOTE — ED ADULT NURSE REASSESSMENT NOTE - NS ED NURSE REASSESS COMMENT FT1
Pt at baseline mental status. Endorsing partial relief with medication administration. Noted to be upset at this time. States she has not been sleeping related to the pain. Pt denies chest pain, headache, dizziness. Lights shut and door closed to enhance relaxation. Call bell within reach. Stretcher in lowest position.

## 2025-02-18 NOTE — H&P ADULT - PROBLEM SELECTOR PLAN 2
WBC:14K  Likely 2/2 recent steroid injection and medrol adilene  patient is afebrile   -ctm WBC:14K  Likely 2/2 recent steroid injection and medrol adilene vs pain   patient is afebrile   -ctm

## 2025-02-19 DIAGNOSIS — M51.26 OTHER INTERVERTEBRAL DISC DISPLACEMENT, LUMBAR REGION: ICD-10-CM

## 2025-02-19 LAB
APTT BLD: 31.4 SEC — SIGNIFICANT CHANGE UP (ref 24.5–35.6)
BLD GP AB SCN SERPL QL: NEGATIVE — SIGNIFICANT CHANGE UP
BLD GP AB SCN SERPL QL: NEGATIVE — SIGNIFICANT CHANGE UP
HCT VFR BLD CALC: 40 % — SIGNIFICANT CHANGE UP (ref 34.5–45)
HGB BLD-MCNC: 13.8 G/DL — SIGNIFICANT CHANGE UP (ref 11.5–15.5)
INR BLD: 0.95 RATIO — SIGNIFICANT CHANGE UP (ref 0.85–1.16)
MCHC RBC-ENTMCNC: 29.2 PG — SIGNIFICANT CHANGE UP (ref 27–34)
MCHC RBC-ENTMCNC: 34.5 G/DL — SIGNIFICANT CHANGE UP (ref 32–36)
MCV RBC AUTO: 84.7 FL — SIGNIFICANT CHANGE UP (ref 80–100)
NRBC # BLD AUTO: 0 K/UL — SIGNIFICANT CHANGE UP (ref 0–0)
NRBC # FLD: 0 K/UL — SIGNIFICANT CHANGE UP (ref 0–0)
NRBC BLD AUTO-RTO: 0 /100 WBCS — SIGNIFICANT CHANGE UP (ref 0–0)
PLATELET # BLD AUTO: 401 K/UL — HIGH (ref 150–400)
PROTHROM AB SERPL-ACNC: 11.3 SEC — SIGNIFICANT CHANGE UP (ref 9.9–13.4)
RBC # BLD: 4.72 M/UL — SIGNIFICANT CHANGE UP (ref 3.8–5.2)
RBC # FLD: 12.5 % — SIGNIFICANT CHANGE UP (ref 10.3–14.5)
RH IG SCN BLD-IMP: POSITIVE — SIGNIFICANT CHANGE UP
RH IG SCN BLD-IMP: POSITIVE — SIGNIFICANT CHANGE UP
WBC # BLD: 13.05 K/UL — HIGH (ref 3.8–10.5)
WBC # FLD AUTO: 13.05 K/UL — HIGH (ref 3.8–10.5)

## 2025-02-19 RX ORDER — HYDROMORPHONE/SOD CHLOR,ISO/PF 2 MG/10 ML
1 SYRINGE (ML) INJECTION EVERY 6 HOURS
Refills: 0 | Status: DISCONTINUED | OUTPATIENT
Start: 2025-02-19 | End: 2025-02-21

## 2025-02-19 RX ORDER — SODIUM CHLORIDE 9 G/1000ML
1000 INJECTION, SOLUTION INTRAVENOUS
Refills: 0 | Status: DISCONTINUED | OUTPATIENT
Start: 2025-02-19 | End: 2025-02-21

## 2025-02-19 RX ORDER — POVIDONE-IODINE 7.5 %
1 SOLUTION, NON-ORAL TOPICAL ONCE
Refills: 0 | Status: COMPLETED | OUTPATIENT
Start: 2025-02-19 | End: 2025-02-19

## 2025-02-19 RX ADMIN — SODIUM CHLORIDE 80 MILLILITER(S): 9 INJECTION, SOLUTION INTRAVENOUS at 15:17

## 2025-02-19 RX ADMIN — GABAPENTIN 100 MILLIGRAM(S): 400 CAPSULE ORAL at 06:08

## 2025-02-19 RX ADMIN — Medication 130 MILLILITER(S): at 14:27

## 2025-02-19 RX ADMIN — Medication 0.5 MILLIGRAM(S): at 02:14

## 2025-02-19 RX ADMIN — Medication 1 MILLIGRAM(S): at 14:23

## 2025-02-19 RX ADMIN — Medication 400 MILLIGRAM(S): at 08:34

## 2025-02-19 RX ADMIN — Medication 1 APPLICATION(S): at 18:57

## 2025-02-19 RX ADMIN — METHOCARBAMOL 500 MILLIGRAM(S): 500 TABLET, FILM COATED ORAL at 00:04

## 2025-02-19 RX ADMIN — Medication 1 MILLIGRAM(S): at 22:13

## 2025-02-19 RX ADMIN — Medication 1 MILLIGRAM(S): at 23:10

## 2025-02-19 RX ADMIN — METHOCARBAMOL 500 MILLIGRAM(S): 500 TABLET, FILM COATED ORAL at 12:06

## 2025-02-19 RX ADMIN — METHOCARBAMOL 500 MILLIGRAM(S): 500 TABLET, FILM COATED ORAL at 06:08

## 2025-02-19 RX ADMIN — GABAPENTIN 100 MILLIGRAM(S): 400 CAPSULE ORAL at 00:04

## 2025-02-19 RX ADMIN — Medication 1 APPLICATION(S): at 18:21

## 2025-02-19 RX ADMIN — Medication 0.5 MILLIGRAM(S): at 08:34

## 2025-02-19 RX ADMIN — LIDOCAINE HYDROCHLORIDE 1 PATCH: 20 JELLY TOPICAL at 14:22

## 2025-02-19 RX ADMIN — METHYLPREDNISOLONE ACETATE 4 MILLIGRAM(S): 80 INJECTION, SUSPENSION INTRA-ARTICULAR; INTRALESIONAL; INTRAMUSCULAR; SOFT TISSUE at 14:23

## 2025-02-19 RX ADMIN — Medication 40 MILLIGRAM(S): at 06:07

## 2025-02-19 RX ADMIN — Medication 400 MILLIGRAM(S): at 14:26

## 2025-02-19 RX ADMIN — ENOXAPARIN SODIUM 40 MILLIGRAM(S): 100 INJECTION SUBCUTANEOUS at 06:07

## 2025-02-19 NOTE — CONSULT NOTE ADULT - ASSESSMENT
33 yo F with PMH of ASD with repair in 2002 (no meds), C-sectionx3, breast reduction, inguinal hernia repair, abdominoplasty, gestational HTN presented 2 days ago with acute lower back pain which radiates down her left thigh to her foot, describes the pain as "cramping/muscle spasm". Has a history of chronic back pain with PT, steroid use, epidural injections, but states she has never had pain this severe and none of the conservative treatments helped alleviate her back pain long term. Pain is exacerbated by movement, also endorsing numbness down the lateral aspect of her left leg to the lateral side and top of her foot. States she has had issues with urination but this is due to limitations of getting up due to pain not because she is unable to tell when she is urinating. No systemic symptoms, fever, chills, nausea, vomiting.     2/19 Given severity of patient's pain and weakness/numbness in S1 distribution as well as failure of conservative management, patient will likely benefit from surgery at this time. Will preop for tonight as patient ate this morning.

## 2025-02-19 NOTE — CONSULT NOTE ADULT - PROBLEM SELECTOR RECOMMENDATION 9
- Preop for tonight, earliest is 8pm as patient ate this morning  - Preop labs (CBC, BMP, type and screen, HCG, coags)  - NPO for OR  - Will consent for surgery - L5-S1 microdiscectomy    [id number]    Case discussed with attending neurosurgeon Dr. Jimenez - Transfer to neurosurgery service under Dr. Jimenez  - Preop for tonight, earliest is 8pm as patient ate this morning  - Preop labs (CBC, BMP, type and screen, HCG, coags)  - NPO for OR  - Will consent for surgery - L5-S1 microdiscectomy    [id number]    Case discussed with attending neurosurgeon Dr. Jimenez

## 2025-02-19 NOTE — CONSULT NOTE ADULT - SUBJECTIVE AND OBJECTIVE BOX
NEUROSURGERY CONSULT    HPI: 33 yo F with PMH of ASD with repair in 2002 (no meds), C-sectionx3, breast reduction, inguinal hernia repair, abdominoplasty, gestational HTN presented 2 days ago with acute lower back pain which radiates down her left thigh to her foot, describes the pain as "cramping/muscle spasm". Has a history of chronic back pain with PT, steroid use, epidural injections, but states she has never had pain this severe and none of the conservative treatments helped alleviate her back pain long term. Pain is exacerbated by movement, also endorsing numbness down the lateral aspect of her left leg to the lateral side and top of her foot. States she has had issues with urination but this is due to limitations of getting up due to pain not because she is unable to tell when she is urinating. No systemic symptoms, fever, chills, nausea, vomiting.     RADIOLOGY:   < from: MR Lumbar Spine w/ IV Cont (02.18.25 @ 21:38) >  IMPRESSION: Right-sided disc herniation L5-S1 extending into the right   lateral recess with mass effect on the right S1 nerve.  No abnormal enhancement.      --- End of Report ---        BOBBY FRIEDMAN MD; Attending Radiologist    < end of copied text >      MEDS:  acetaminophen     Tablet .. 650 milliGRAM(s) Oral every 6 hours PRN  gabapentin 100 milliGRAM(s) Oral two times a day  HYDROmorphone  Injectable 1 milliGRAM(s) IV Push every 6 hours PRN  ibuprofen  Tablet. 400 milliGRAM(s) Oral every 6 hours  lactated ringers. 1000 milliLiter(s) IV Continuous <Continuous>  lidocaine   4% Patch 1 Patch Transdermal every 24 hours  methocarbamol 500 milliGRAM(s) Oral every 6 hours  pantoprazole    Tablet 40 milliGRAM(s) Oral before breakfast  sodium chloride 0.9%. 1000 milliLiter(s) IV Continuous <Continuous>      Vital Signs Last 24 Hrs  T(C): 36.4 (19 Feb 2025 13:55), Max: 36.6 (18 Feb 2025 20:30)  T(F): 97.6 (19 Feb 2025 13:55), Max: 97.9 (19 Feb 2025 02:10)  HR: 61 (19 Feb 2025 13:55) (58 - 90)  BP: 142/90 (19 Feb 2025 13:55) (123/85 - 158/95)  BP(mean): --  RR: 18 (19 Feb 2025 13:55) (17 - 18)  SpO2: 99% (19 Feb 2025 13:55) (99% - 100%)    Parameters below as of 19 Feb 2025 13:55  Patient On (Oxygen Delivery Method): room air        LABS:                        13.8   13.05 )-----------( 401      ( 19 Feb 2025 05:54 )             40.0     02-18    137  |  101  |  11  ----------------------------<  121[H]  3.9   |  25  |  0.66    Ca    9.0      18 Feb 2025 07:08  Phos  4.1     02-18  Mg     2.00     02-18    TPro  6.8  /  Alb  4.0  /  TBili  0.3  /  DBili  x   /  AST  12  /  ALT  31  /  AlkPhos  117  02-18          PHYSICAL EXAM:  AOx3, Following Commands, Face symmetrical    RUE MOTOR:   Delt 5/5  Bicep 5/5  Tricep 5/5      HG 5/5      LUE MOTOR:   Delt 5/5  Bicep 5/5   Tricep 5/5     HG 5/5     RLE MOTOR:   HF 5/5                   KE 5/5         DF 5/5         PF 5/5    EHL 5/5    LLE MOTOR:   HF 4+/5             KE 4+/5            DF 3/5            PF 2/5       EHL 2/5      SENSATION: decreased sensation to the lateral aspect of the left leg and foot, dorsal aspect of foot  No clonus   DTRs: patellar 2+ b/l      NEUROSURGERY CONSULT    HPI: 33 yo F with PMH of ASD with repair in 2002 (no meds), C-sectionx3, breast reduction, inguinal hernia repair, abdominoplasty, gestational HTN presented 2 days ago with acute lower back pain which radiates down her right thigh to her foot, describes the pain as "cramping/muscle spasm". Has a history of chronic back pain with PT, steroid use, epidural injections, but states she has never had pain this severe and none of the conservative treatments helped alleviate her back pain long term. Pain is exacerbated by movement, also endorsing numbness down the lateral aspect of her right leg to the lateral side and top of her foot. States she has had issues with urination but this is due to limitations of getting up due to pain not because she is unable to tell when she is urinating. No systemic symptoms, fever, chills, nausea, vomiting.     RADIOLOGY:   < from: MR Lumbar Spine w/ IV Cont (02.18.25 @ 21:38) >  IMPRESSION: Right-sided disc herniation L5-S1 extending into the right   lateral recess with mass effect on the right S1 nerve.  No abnormal enhancement.  < end of copied text >      MEDS:  acetaminophen     Tablet .. 650 milliGRAM(s) Oral every 6 hours PRN  gabapentin 100 milliGRAM(s) Oral two times a day  HYDROmorphone  Injectable 1 milliGRAM(s) IV Push every 6 hours PRN  ibuprofen  Tablet. 400 milliGRAM(s) Oral every 6 hours  lactated ringers. 1000 milliLiter(s) IV Continuous <Continuous>  lidocaine   4% Patch 1 Patch Transdermal every 24 hours  methocarbamol 500 milliGRAM(s) Oral every 6 hours  pantoprazole    Tablet 40 milliGRAM(s) Oral before breakfast  sodium chloride 0.9%. 1000 milliLiter(s) IV Continuous <Continuous>      Vital Signs Last 24 Hrs  T(C): 36.4 (19 Feb 2025 13:55), Max: 36.6 (18 Feb 2025 20:30)  T(F): 97.6 (19 Feb 2025 13:55), Max: 97.9 (19 Feb 2025 02:10)  HR: 61 (19 Feb 2025 13:55) (58 - 90)  BP: 142/90 (19 Feb 2025 13:55) (123/85 - 158/95)  BP(mean): --  RR: 18 (19 Feb 2025 13:55) (17 - 18)  SpO2: 99% (19 Feb 2025 13:55) (99% - 100%)    Parameters below as of 19 Feb 2025 13:55  Patient On (Oxygen Delivery Method): room air        LABS:                        13.8   13.05 )-----------( 401      ( 19 Feb 2025 05:54 )             40.0     02-18    137  |  101  |  11  ----------------------------<  121[H]  3.9   |  25  |  0.66    Ca    9.0      18 Feb 2025 07:08  Phos  4.1     02-18  Mg     2.00     02-18    TPro  6.8  /  Alb  4.0  /  TBili  0.3  /  DBili  x   /  AST  12  /  ALT  31  /  AlkPhos  117  02-18          PHYSICAL EXAM:  AOx3, Following Commands, Face symmetrical    RUE MOTOR:   Delt 5/5  Bicep 5/5  Tricep 5/5      HG 5/5      LUE MOTOR:   Delt 5/5  Bicep 5/5   Tricep 5/5     HG 5/5     LLE MOTOR:   HF 5/5                   KE 5/5         DF 5/5         PF 5/5    EHL 5/5    RLE MOTOR:   HF 4+/5             KE 4+/5            DF 3/5            PF 2/5       EHL 2/5      SENSATION: decreased sensation to the lateral aspect of the right leg and foot, dorsal aspect of foot  No clonus   DTRs: patellar 2+ b/l

## 2025-02-19 NOTE — CONSULT NOTE ADULT - PROVIDER SPECIALTY LIST ADULT
Test Reason : 

Blood Pressure : ***/*** mmHG

Vent. Rate : 069 BPM     Atrial Rate : 069 BPM

   P-R Int : 124 ms          QRS Dur : 086 ms

    QT Int : 376 ms       P-R-T Axes : -18 -08 048 degrees

   QTc Int : 402 ms

 

NORMAL SINUS RHYTHM

CANNOT RULE OUT INFERIOR INFARCT , AGE UNDETERMINED

ABNORMAL ECG

NO PREVIOUS ECGS AVAILABLE

Confirmed by YURIDIA SOTO MD ([id number]) on 12/28/2018 7:13:20 PM

 

Referred By:             Confirmed By:YURIDIA SOTO MD
Neurosurgery

## 2025-02-19 NOTE — CHART NOTE - NSCHARTNOTEFT_GEN_A_CORE
Pt seen and examined. w/ ongoing acute on chronic back pain. MR Lumbar Spine obtained w/  Right-sided disc herniation L5-S1 extending into the right lateral recess with mass effect on the right S1 nerve. Neurosurg made aware. Pt transferred to neurosurg service.
Provider paged neurosurgery for spine consult on patient with MRI L spine showing R sided disc herniation with mass effect on R S1 nerve. Pending call back.
Reference #: [id number]    Practitioner Count: 0  Pharmacy Count: 0  Current Opioid Prescriptions: 0  Current Benzodiazepine Prescriptions: 0  Current Stimulant Prescriptions: 0      Patient Demographic Information (PDI)       PDI	First Name	Last Name	Birth Date	Gender	Street Address	[facility]  JAYME Mulligan	1990	Female	[address]	[address]    Prescription Information      PDI Filter:    PDI	Current Rx	Drug Type	Rx Written	Rx Dispensed	Drug	Quantity	Days Supply	Prescriber Name	Prescriber NATALEE #	Payment Method	Dispenser  A	N	O	11/06/2024	11/06/2024	oxycodone-acetaminophen 5-325 mg tablet	30	15	Donny Carrillo MD	[id number]	[facility] #[id number]  A	N	S	08/30/2024	09/04/2024	phentermine 37.5 mg tablet	30	30	Seun Kingsley MD	[id number]	[facility] #[id number]  A	N	S	07/12/2024	07/15/2024	phentermine 37.5 mg tablet	30	30	Seun Kingsley MD	[id number]	[facility] #[id number]  A	N	S	06/11/2024	06/11/2024	phentermine 37.5 mg tablet	30	30	Seun Kingsley MD	[id number]	[facility] #[id number]

## 2025-02-20 ENCOUNTER — TRANSCRIPTION ENCOUNTER (OUTPATIENT)
Age: 35
End: 2025-02-20

## 2025-02-20 DIAGNOSIS — M54.16 RADICULOPATHY, LUMBAR REGION: ICD-10-CM

## 2025-02-20 LAB
ANION GAP SERPL CALC-SCNC: 14 MMOL/L — SIGNIFICANT CHANGE UP (ref 7–14)
BUN SERPL-MCNC: 17 MG/DL — SIGNIFICANT CHANGE UP (ref 7–23)
CALCIUM SERPL-MCNC: 9 MG/DL — SIGNIFICANT CHANGE UP (ref 8.4–10.5)
CHLORIDE SERPL-SCNC: 103 MMOL/L — SIGNIFICANT CHANGE UP (ref 98–107)
CO2 SERPL-SCNC: 22 MMOL/L — SIGNIFICANT CHANGE UP (ref 22–31)
CREAT SERPL-MCNC: 0.66 MG/DL — SIGNIFICANT CHANGE UP (ref 0.5–1.3)
EGFR: 118 ML/MIN/1.73M2 — SIGNIFICANT CHANGE UP
GLUCOSE BLDC GLUCOMTR-MCNC: 89 MG/DL — SIGNIFICANT CHANGE UP (ref 70–99)
GLUCOSE SERPL-MCNC: 116 MG/DL — HIGH (ref 70–99)
HCT VFR BLD CALC: 38.9 % — SIGNIFICANT CHANGE UP (ref 34.5–45)
HGB BLD-MCNC: 13.3 G/DL — SIGNIFICANT CHANGE UP (ref 11.5–15.5)
MAGNESIUM SERPL-MCNC: 2 MG/DL — SIGNIFICANT CHANGE UP (ref 1.6–2.6)
MCHC RBC-ENTMCNC: 29 PG — SIGNIFICANT CHANGE UP (ref 27–34)
MCHC RBC-ENTMCNC: 34.2 G/DL — SIGNIFICANT CHANGE UP (ref 32–36)
MCV RBC AUTO: 84.9 FL — SIGNIFICANT CHANGE UP (ref 80–100)
NRBC # BLD AUTO: 0 K/UL — SIGNIFICANT CHANGE UP (ref 0–0)
NRBC # FLD: 0 K/UL — SIGNIFICANT CHANGE UP (ref 0–0)
NRBC BLD AUTO-RTO: 0 /100 WBCS — SIGNIFICANT CHANGE UP (ref 0–0)
PHOSPHATE SERPL-MCNC: 3.9 MG/DL — SIGNIFICANT CHANGE UP (ref 2.5–4.5)
PLATELET # BLD AUTO: 375 K/UL — SIGNIFICANT CHANGE UP (ref 150–400)
POTASSIUM SERPL-MCNC: 3.8 MMOL/L — SIGNIFICANT CHANGE UP (ref 3.5–5.3)
POTASSIUM SERPL-SCNC: 3.8 MMOL/L — SIGNIFICANT CHANGE UP (ref 3.5–5.3)
RBC # BLD: 4.58 M/UL — SIGNIFICANT CHANGE UP (ref 3.8–5.2)
RBC # FLD: 12.7 % — SIGNIFICANT CHANGE UP (ref 10.3–14.5)
SODIUM SERPL-SCNC: 139 MMOL/L — SIGNIFICANT CHANGE UP (ref 135–145)
WBC # BLD: 11.43 K/UL — HIGH (ref 3.8–10.5)
WBC # FLD AUTO: 11.43 K/UL — HIGH (ref 3.8–10.5)

## 2025-02-20 PROCEDURE — 72100 X-RAY EXAM L-S SPINE 2/3 VWS: CPT | Mod: 26

## 2025-02-20 DEVICE — FLOSEAL WITH RECOTHROM THROMBIN 5ML: Type: IMPLANTABLE DEVICE | Status: FUNCTIONAL

## 2025-02-20 DEVICE — SURGIFOAM PAD 8CM X 12.5CM X 10MM (100): Type: IMPLANTABLE DEVICE | Status: FUNCTIONAL

## 2025-02-20 RX ORDER — HYDROMORPHONE/SOD CHLOR,ISO/PF 2 MG/10 ML
0.5 SYRINGE (ML) INJECTION
Refills: 0 | Status: DISCONTINUED | OUTPATIENT
Start: 2025-02-20 | End: 2025-02-20

## 2025-02-20 RX ORDER — OXYCODONE HYDROCHLORIDE 30 MG/1
5 TABLET ORAL EVERY 6 HOURS
Refills: 0 | Status: DISCONTINUED | OUTPATIENT
Start: 2025-02-20 | End: 2025-02-21

## 2025-02-20 RX ORDER — CEFAZOLIN SODIUM IN 0.9 % NACL 3 G/100 ML
2000 INTRAVENOUS SOLUTION, PIGGYBACK (ML) INTRAVENOUS EVERY 8 HOURS
Refills: 0 | Status: COMPLETED | OUTPATIENT
Start: 2025-02-20 | End: 2025-02-21

## 2025-02-20 RX ORDER — HYDROMORPHONE/SOD CHLOR,ISO/PF 2 MG/10 ML
1 SYRINGE (ML) INJECTION
Refills: 0 | Status: DISCONTINUED | OUTPATIENT
Start: 2025-02-20 | End: 2025-02-20

## 2025-02-20 RX ORDER — ONDANSETRON HCL/PF 4 MG/2 ML
4 VIAL (ML) INJECTION ONCE
Refills: 0 | Status: DISCONTINUED | OUTPATIENT
Start: 2025-02-20 | End: 2025-02-20

## 2025-02-20 RX ADMIN — OXYCODONE HYDROCHLORIDE 5 MILLIGRAM(S): 30 TABLET ORAL at 15:00

## 2025-02-20 RX ADMIN — GABAPENTIN 100 MILLIGRAM(S): 400 CAPSULE ORAL at 06:03

## 2025-02-20 RX ADMIN — OXYCODONE HYDROCHLORIDE 5 MILLIGRAM(S): 30 TABLET ORAL at 14:21

## 2025-02-20 RX ADMIN — OXYCODONE HYDROCHLORIDE 5 MILLIGRAM(S): 30 TABLET ORAL at 20:48

## 2025-02-20 RX ADMIN — Medication 1 MILLIGRAM(S): at 17:32

## 2025-02-20 RX ADMIN — GABAPENTIN 100 MILLIGRAM(S): 400 CAPSULE ORAL at 17:54

## 2025-02-20 RX ADMIN — Medication 1 MILLIGRAM(S): at 11:28

## 2025-02-20 RX ADMIN — LIDOCAINE HYDROCHLORIDE 1 PATCH: 20 JELLY TOPICAL at 12:01

## 2025-02-20 RX ADMIN — METHOCARBAMOL 500 MILLIGRAM(S): 500 TABLET, FILM COATED ORAL at 11:58

## 2025-02-20 RX ADMIN — GABAPENTIN 100 MILLIGRAM(S): 400 CAPSULE ORAL at 00:16

## 2025-02-20 RX ADMIN — LIDOCAINE HYDROCHLORIDE 1 PATCH: 20 JELLY TOPICAL at 18:26

## 2025-02-20 RX ADMIN — OXYCODONE HYDROCHLORIDE 5 MILLIGRAM(S): 30 TABLET ORAL at 21:48

## 2025-02-20 RX ADMIN — METHOCARBAMOL 500 MILLIGRAM(S): 500 TABLET, FILM COATED ORAL at 06:03

## 2025-02-20 RX ADMIN — Medication 1 MILLIGRAM(S): at 14:05

## 2025-02-20 RX ADMIN — Medication 1 APPLICATION(S): at 06:07

## 2025-02-20 RX ADMIN — Medication 100 MILLIGRAM(S): at 17:09

## 2025-02-20 RX ADMIN — Medication 1 MILLIGRAM(S): at 17:09

## 2025-02-20 RX ADMIN — Medication 1 MILLIGRAM(S): at 11:04

## 2025-02-20 RX ADMIN — Medication 1 MILLIGRAM(S): at 07:44

## 2025-02-20 RX ADMIN — SODIUM CHLORIDE 80 MILLILITER(S): 9 INJECTION, SOLUTION INTRAVENOUS at 11:57

## 2025-02-20 RX ADMIN — METHOCARBAMOL 500 MILLIGRAM(S): 500 TABLET, FILM COATED ORAL at 00:55

## 2025-02-20 RX ADMIN — METHOCARBAMOL 500 MILLIGRAM(S): 500 TABLET, FILM COATED ORAL at 17:09

## 2025-02-20 RX ADMIN — Medication 1 MILLIGRAM(S): at 23:25

## 2025-02-20 NOTE — PHYSICAL THERAPY INITIAL EVALUATION ADULT - ADDITIONAL COMMENTS
Pt lives in a garden apartment with no steps to negotiate. Prior to hospital admission, pt was completely independent and used no assistive device. Pt reports 2 recent falls on Monday, no injuries sustained. Pt states that the falls happened due to the pain being so bad.    Pt left comfortable in bed, NAD, all lines intact, all precautions maintained, with call bell in reach, significant other at bedside, and RN Tabby aware of PT evaluation.

## 2025-02-20 NOTE — PHYSICAL THERAPY INITIAL EVALUATION ADULT - PATIENT PROFILE REVIEW, REHAB EVAL
Spoke with covering RN Portia for SLOANE Mcnair prior to PT evaluation-->Pt OK for PT consult/OOB activity./yes

## 2025-02-20 NOTE — PHYSICAL THERAPY INITIAL EVALUATION ADULT - PRECAUTIONS/LIMITATIONS, REHAB EVAL
Advance care planning discussed with patient has a form and instructed to bring in to copy in chart. fall precautions/spinal precautions/surgical precautions

## 2025-02-20 NOTE — PHYSICAL THERAPY INITIAL EVALUATION ADULT - MANUAL MUSCLE TESTING RESULTS, REHAB EVAL
surgical/spinal precautions; bilateral upper extremities and bilateral lower extremities at least 3+/5/grossly assessed due to

## 2025-02-20 NOTE — PHYSICAL THERAPY INITIAL EVALUATION ADULT - GENERAL OBSERVATIONS, REHAB EVAL
Pt encountered in semisupine position, no distress, AxOx4, with +IV. Pt agreeable to participate in PT evaluation. Vitals taken; /88mmHg, heart rate 71bpm.

## 2025-02-20 NOTE — PHYSICAL THERAPY INITIAL EVALUATION ADULT - GAIT PATTERN USED, PT EVAL
MD Heidi Sidhu, RN   Caller: Unspecified (Yesterday,  9:08 AM)   I am not prescribing her tramadol, nor managing her pain   We did not address this at her last visit   Should be sent to prescribing physician     
Patients living facility was notified after her last visit that patient was told to stop taking the tramadol.  They need an actually stop order sent to the pharmacy in order to take if off of her medication list.  Please update with [facility]  
The number listed to call the facility back is a fax number, can not reach out.  
3-point gait

## 2025-02-21 ENCOUNTER — TRANSCRIPTION ENCOUNTER (OUTPATIENT)
Age: 35
End: 2025-02-21

## 2025-02-21 VITALS
DIASTOLIC BLOOD PRESSURE: 75 MMHG | TEMPERATURE: 98 F | SYSTOLIC BLOOD PRESSURE: 131 MMHG | RESPIRATION RATE: 18 BRPM | OXYGEN SATURATION: 99 % | HEART RATE: 70 BPM

## 2025-02-21 DIAGNOSIS — Z98.890 OTHER SPECIFIED POSTPROCEDURAL STATES: ICD-10-CM

## 2025-02-21 RX ORDER — OXYCODONE HYDROCHLORIDE 30 MG/1
5 TABLET ORAL EVERY 6 HOURS
Refills: 0 | Status: DISCONTINUED | OUTPATIENT
Start: 2025-02-21 | End: 2025-02-21

## 2025-02-21 RX ORDER — OXYCODONE HYDROCHLORIDE 30 MG/1
1 TABLET ORAL
Qty: 28 | Refills: 0
Start: 2025-02-21 | End: 2025-02-27

## 2025-02-21 RX ORDER — METHOCARBAMOL 500 MG/1
1 TABLET, FILM COATED ORAL
Qty: 28 | Refills: 0 | DISCHARGE
Start: 2025-02-21 | End: 2025-02-27

## 2025-02-21 RX ORDER — METHOCARBAMOL 500 MG/1
1 TABLET, FILM COATED ORAL
Qty: 28 | Refills: 0
Start: 2025-02-21 | End: 2025-02-27

## 2025-02-21 RX ORDER — SENNA 187 MG
2 TABLET ORAL AT BEDTIME
Refills: 0 | Status: DISCONTINUED | OUTPATIENT
Start: 2025-02-21 | End: 2025-02-21

## 2025-02-21 RX ORDER — ACETAMINOPHEN 500 MG/5ML
2 LIQUID (ML) ORAL
Qty: 0 | Refills: 0 | DISCHARGE
Start: 2025-02-21

## 2025-02-21 RX ORDER — METHYLPREDNISOLONE ACETATE 80 MG/ML
0 INJECTION, SUSPENSION INTRA-ARTICULAR; INTRALESIONAL; INTRAMUSCULAR; SOFT TISSUE
Refills: 0 | DISCHARGE

## 2025-02-21 RX ORDER — POLYETHYLENE GLYCOL 3350 17 G/17G
17 POWDER, FOR SOLUTION ORAL DAILY
Refills: 0 | Status: DISCONTINUED | OUTPATIENT
Start: 2025-02-21 | End: 2025-02-21

## 2025-02-21 RX ORDER — OXYCODONE HYDROCHLORIDE 30 MG/1
10 TABLET ORAL EVERY 6 HOURS
Refills: 0 | Status: DISCONTINUED | OUTPATIENT
Start: 2025-02-21 | End: 2025-02-21

## 2025-02-21 RX ORDER — ACETAMINOPHEN 500 MG/5ML
1000 LIQUID (ML) ORAL EVERY 6 HOURS
Refills: 0 | Status: DISCONTINUED | OUTPATIENT
Start: 2025-02-21 | End: 2025-02-21

## 2025-02-21 RX ADMIN — Medication 1 MILLIGRAM(S): at 05:40

## 2025-02-21 RX ADMIN — GABAPENTIN 100 MILLIGRAM(S): 400 CAPSULE ORAL at 05:43

## 2025-02-21 RX ADMIN — OXYCODONE HYDROCHLORIDE 10 MILLIGRAM(S): 30 TABLET ORAL at 15:47

## 2025-02-21 RX ADMIN — Medication 1 MILLIGRAM(S): at 06:40

## 2025-02-21 RX ADMIN — Medication 100 MILLIGRAM(S): at 00:38

## 2025-02-21 RX ADMIN — METHOCARBAMOL 500 MILLIGRAM(S): 500 TABLET, FILM COATED ORAL at 05:43

## 2025-02-21 RX ADMIN — Medication 1000 MILLIGRAM(S): at 12:08

## 2025-02-21 RX ADMIN — METHOCARBAMOL 500 MILLIGRAM(S): 500 TABLET, FILM COATED ORAL at 12:09

## 2025-02-21 RX ADMIN — LIDOCAINE HYDROCHLORIDE 1 PATCH: 20 JELLY TOPICAL at 12:15

## 2025-02-21 RX ADMIN — OXYCODONE HYDROCHLORIDE 10 MILLIGRAM(S): 30 TABLET ORAL at 10:07

## 2025-02-21 RX ADMIN — Medication 1000 MILLIGRAM(S): at 13:08

## 2025-02-21 RX ADMIN — LIDOCAINE HYDROCHLORIDE 1 PATCH: 20 JELLY TOPICAL at 00:15

## 2025-02-21 RX ADMIN — METHOCARBAMOL 500 MILLIGRAM(S): 500 TABLET, FILM COATED ORAL at 00:38

## 2025-02-21 RX ADMIN — Medication 1 MILLIGRAM(S): at 00:25

## 2025-02-21 RX ADMIN — OXYCODONE HYDROCHLORIDE 10 MILLIGRAM(S): 30 TABLET ORAL at 09:05

## 2025-02-21 RX ADMIN — OXYCODONE HYDROCHLORIDE 10 MILLIGRAM(S): 30 TABLET ORAL at 16:46

## 2025-02-21 NOTE — DISCHARGE NOTE PROVIDER - NSRESEARCHGRANT_OVERRIDEREC_GEN_A_CORE
Patient has been identified as eligible for the Care Transitions Program. Inpatient Care Manager to email Care Transition Nurse at [email or web address]. If any questions, please call [phone] or Tie Line  when patient is ready for discharge.     Thank you   This is a surgical and/or non-medical patient.

## 2025-02-21 NOTE — DISCHARGE NOTE NURSING/CASE MANAGEMENT/SOCIAL WORK - NSDCPECAREGIVERED_GEN_ALL_CORE
laminectomy, pain after surgery, incision action plan neuro sine d/c sheet, pain after surgery, incision action plan, laminectomy

## 2025-02-21 NOTE — DISCHARGE NOTE NURSING/CASE MANAGEMENT/SOCIAL WORK - FINANCIAL ASSISTANCE
[facility] provides services at a reduced cost to those who are determined to be eligible through [facility]’s financial assistance program. Information regarding [facility]’s financial assistance program can be found by going to [email or web address] or by calling [phone].

## 2025-02-21 NOTE — PROGRESS NOTE ADULT - ASSESSMENT
33 yo F with PMH of ASD with repair in 2002 (no meds), C-sectionx3, breast reduction, inguinal hernia repair, abdominoplasty, gestational HTN presented 2 days ago with acute lower back pain which radiates down her left thigh to her foot, describes the pain as "cramping/muscle spasm". Has a history of chronic back pain with PT, steroid use, epidural injections, but states she has never had pain this severe and none of the conservative treatments helped alleviate her back pain long term. Pain is exacerbated by movement, also endorsing numbness down the lateral aspect of her left leg to the lateral side and top of her foot. States she has had issues with urination but this is due to limitations of getting up due to pain not because she is unable to tell when she is urinating. No systemic symptoms, fever, chills, nausea, vomiting.     2/19 Given severity of patient's pain and weakness/numbness in S1 distribution as well as failure of conservative management, patient will likely benefit from surgery at this time. Will preop for tonight as patient ate this morning.  2/20: Stable exam. OR rescheduled to this AM
33 y/o F w/ PMHx of ASD (repaired in , no meds),  x 3, breast reduction, inguinal hernia repair, abdominoplasty presented to ED with acute LBP that radiates down RLE into R foot w/ decreased sensation to the lateral aspect of the RLE and dorsal aspect of right foot. MRI L Spine reveals R-sided L5-S1 HNP extending w/ mass effect on the R S1 nerve.     Given severity of patient's pain and weakness/numbness in S1 distribution as well as failure of conservative management, patient will likely benefit from surgery at this time.   : OR for L5-S1 Discectomy, Rangel-laminectomy  : POD # 1, motor and sensory exam improved. Patient ambulatory
35 y/o F w/ PMHx of ASD (repaired in , no meds),  x 3, breast reduction, inguinal hernia repair, abdominoplasty presented to ED with acute LBP that radiates down RLE into R foot w/ decreased sensation to the lateral aspect of the RLE and dorsal aspect of right foot. MRI L Spine reveals R-sided L5-S1 HNP extending w/ mass effect on the R S1 nerve.     Given severity of patient's pain and weakness/numbness in S1 distribution as well as failure of conservative management, patient will likely benefit from surgery at this time.   : OR for L5-S1 Discectomy, Rangel-laminectomy

## 2025-02-21 NOTE — DISCHARGE NOTE PROVIDER - NSDCCPCAREPLAN_GEN_ALL_CORE_FT
PRINCIPAL DISCHARGE DIAGNOSIS  Diagnosis: Herniated nucleus pulposus, L5-S1  Assessment and Plan of Treatment:

## 2025-02-21 NOTE — DISCHARGE NOTE NURSING/CASE MANAGEMENT/SOCIAL WORK - PATIENT PORTAL LINK FT
You can access the FollowMyHealth Patient Portal offered by [facility] by registering at the following website: [email or web address]. By joining [facility]’s FollowMyHealth portal, you will also be able to view your health information using other applications (apps) compatible with our system.

## 2025-02-21 NOTE — DISCHARGE NOTE PROVIDER - NSDCFUADDINST_GEN_ALL_CORE_FT
- You had surgery on 2/20/2025. The surgery you had was L5-S1 hemilaminectomy and microdiscectomy.     - Remove bandage from incision site on post op day 3 if it was not removed by the surgical team prior to discharge. Once removed, incision site does not need a bandage or ointment on it. If you have steri strips (small, skinny beige strips), they will eventually fall off over time. Do not pull at steri strips. If steri strips are more than senior living off, you may remove them. Do not touch or scratch incision to prevent infection.    - Your incision is closed with clear sutures, these are absorbable and will dissolve over time. Do not pick or scratch at incision.     - Shower daily with shampoo/soap on post operative day 4 (DATE: 2/24/25) Avoid long soaks and do not submerge incision in water (no baths.) Allow soap and water to run over the incision. Pat incision area dry with clean towel- do not scrub. Please shower regularly to ensure incision stays clean to avoid post operative infections.  You may have a body shower daily, as long as your head incision is covered by a shower cap and does not get wet until post op day 4.     - Notify your surgeon if you notice increased redness, drainage or your incision area opening.     - Return to ER immediately for high fevers, severe headache, vomiting, lethargy or weakness    - Please call your neurosurgeon following discharge to make follow up appointment in 1 week after discharge unless otherwise specified. See contact information.    - Prescription post operative medication has been sent to [facility] in the hospital. All post operative prescriptions should be picked up before departing the hospital. You can also take over the counter tylenol for pain as needed.     - Ambulate as tolerate. Continue with all "activities of daily living." Avoid strenuous activity or heavy lifting until cleared for additional activity at your follow up appointment. You cannot drive while taking narcotics (oxycodone, valium, etc.)     - Do not return to work or school until cleared by your neurosurgeon at your follow up visit unless specified to you during your hospital stay    - Do not take any blood thinning medications such as aspirin, motrin, ibuprofen, warfarin, coumadin, plavix, heparin, lovenox, Xarelto, Eliquis etc. until cleared by your neurosurgeon    - Surgery, anesthesia, and pain medications can cause constipation. Please take over the counter stool softeners daily until regular bowel movements are achieved. Examples include Miralax, Senna, Colace, Milk of Magnesia, or Dulcolax suppositories. Please consult drug store pharmacist or pediatrician if there are question about dosing if the patient is pediatric.     - No heavy lifting, bending, twisting until cleared by neurosurgeon.

## 2025-02-21 NOTE — DISCHARGE NOTE PROVIDER - CARE PROVIDER_API CALL
Santosh Jimenez  Neurosurgery  [address] [place] [address]  Phone: [phone]  Fax: [phone]  Follow Up Time: 1 week   Santosh Jimenez  Neurosurgery  [address] [facility]  [place] [address]  Phone: [phone]  Fax: [phone]  Follow Up Time: 1 week

## 2025-02-21 NOTE — DISCHARGE NOTE NURSING/CASE MANAGEMENT/SOCIAL WORK - NSDCPNINST_GEN_ALL_CORE
Notify Dr Jimenez if you experience any increase in pain not relieved with medication, any redness, drainage or swelling around incision or any fever >100.5.  Drink plenty of fluids.  No heavy lifting or straining, pushing or pulling.  Maintain proper body alignment, no bending or twisting motions.  Use over the counter stool softeners to assist with constipation which can be a side effect of narcotic pain medication.

## 2025-02-21 NOTE — DISCHARGE NOTE PROVIDER - HOSPITAL COURSE
35 y/o F w/ PMHx of ASD (repaired in , no meds),  x 3, breast reduction, inguinal hernia repair, abdominoplasty presented to ED with acute LBP that radiates down RLE into R foot w/ decreased sensation to the lateral aspect of the RLE and dorsal aspect of right foot. MRI L Spine reveals R-sided L5-S1 HNP extending w/ mass effect on the R S1 nerve.     Given severity of patient's pain and weakness/numbness in S1 distribution as well as failure of conservative management, patient will likely benefit from surgery at this time.   : OR for L5-S1 Discectomy, Rangel-laminectomy  : POD # 1, motor and sensory exam improved. Patient ambulatory. Doing well, will walk with PT, d/c planning.

## 2025-02-21 NOTE — PROGRESS NOTE ADULT - SUBJECTIVE AND OBJECTIVE BOX
ANESTHESIA POSTOP CHECK    34y Female POSTOP DAY 1     Vital Signs Last 24 Hrs  T(C): 36.9 (21 Feb 2025 05:18), Max: 36.9 (20 Feb 2025 10:25)  T(F): 98.4 (21 Feb 2025 05:18), Max: 98.4 (20 Feb 2025 10:25)  HR: 55 (21 Feb 2025 05:18) (55 - 90)  BP: 129/79 (21 Feb 2025 05:18) (114/75 - 140/75)  BP(mean): 96 (20 Feb 2025 13:00) (83 - 105)  RR: 16 (21 Feb 2025 05:18) (12 - 20)  SpO2: 99% (21 Feb 2025 05:18) (95% - 99%)    Parameters below as of 21 Feb 2025 05:18  Patient On (Oxygen Delivery Method): room air      I&O's Summary    20 Feb 2025 07:01  -  21 Feb 2025 07:00  --------------------------------------------------------  IN: 1140 mL / OUT: 1550 mL / NET: -410 mL        [X ] NO APPARENT ANESTHESIA COMPLICATIONS      Comments: 
OR rescheduled to this AM  No issues overnight  Vital Signs Last 24 Hrs  T(C): 36.4 (19 Feb 2025 22:00), Max: 36.5 (19 Feb 2025 17:00)  T(F): 97.5 (19 Feb 2025 22:00), Max: 97.7 (19 Feb 2025 17:00)  HR: 79 (19 Feb 2025 22:00) (61 - 90)  BP: 132/79 (19 Feb 2025 22:00) (124/89 - 147/99)  BP(mean): --  RR: 18 (19 Feb 2025 22:00) (17 - 18)  SpO2: 97% (19 Feb 2025 22:00) (97% - 100%)    Parameters below as of 19 Feb 2025 22:00  Patient On (Oxygen Delivery Method): room air    AAO X 3  PERRLA, EOMI  Face symmetric  ALEX, Bilateral UE and LLE 5/5  RLE pain limited, HF 4+/5, KE 4+/5, DF 3/5, PF 2/5, EHL 2/5    SENSATION: decreased sensation to the lateral aspect of the right leg and foot, dorsal aspect of foot  No clonus     MEDICATIONS  (STANDING):  chlorhexidine 2% Cloths 1 Application(s) Topical every 12 hours  gabapentin 100 milliGRAM(s) Oral two times a day  lactated ringers. 1000 milliLiter(s) (80 mL/Hr) IV Continuous <Continuous>  lidocaine   4% Patch 1 Patch Transdermal every 24 hours  methocarbamol 500 milliGRAM(s) Oral every 6 hours  pantoprazole    Tablet 40 milliGRAM(s) Oral before breakfast    MEDICATIONS  (PRN):  acetaminophen     Tablet .. 650 milliGRAM(s) Oral every 6 hours PRN Temp greater or equal to 38C (100.4F), Mild Pain (1 - 3)  HYDROmorphone  Injectable 1 milliGRAM(s) IV Push every 6 hours PRN Severe Pain (7 - 10)                          13.8   13.05 )-----------( 401      ( 19 Feb 2025 05:54 )             40.0     02-18    137  |  101  |  11  ----------------------------<  121[H]  3.9   |  25  |  0.66    Ca    9.0      18 Feb 2025 07:08  Phos  4.1     02-18  Mg     2.00     02-18    TPro  6.8  /  Alb  4.0  /  TBili  0.3  /  DBili  x   /  AST  12  /  ALT  31  /  AlkPhos  117  02-18    PT/INR - ( 19 Feb 2025 15:53 )   PT: 11.3 sec;   INR: 0.95 ratio         PTT - ( 19 Feb 2025 15:53 )  PTT: 31.4 sec    HCG Quantitative, Serum (02.18.25 @ 07:08)    HCG Quantitative, Serum: <1.0: For pregnancy evaluation the reference values are as follows:  Negative: <5 mIU/mL  Indeterminate: 5-25 mIU/mL (suggest repeat testing in 72hrs)  Positive: >25 mIU/mL  Note: hCG results of false positive and false negative for pregnancy are  rare, but can occur with this, and other hCG tests. Vanessa- and  post-menopausal females may have mildly elevated hCG concentrations,  usually less than 14 mIU/mL, that are constant over time.  Weeks of pregnancy:           mIU/mL  ------------------         -------          3                                6 -      71          4                              10 -     750          5                             220 -   7,100          6                             160 -  32,000          7                3,700 - 164,000          8                          32,000 - 150,000          9                          64,000 - 151,000         10                         47,000 - 187,000         12                         28,000 - 211,000         14                         14,000 -  63,000         15                         12,000 -  71,000         16 - 18                   8,000 -  58,000 mIU/mL    Type + Screen (02.19.25 @ 18:57)    ABO Interpretation: O   Rh Interpretation: Positive   Antibody Screen: Negative    < from: MR Lumbar Spine w/ IV Cont (02.18.25 @ 21:38) >  The visualized thoracic and lumbosacral vertebral bodies are normal in   height and signal intensity. There is mild degenerative disc disease at   L4-5 and L5-S1 with loss of signal on the T2-weighted images. At the   L5-S1 level there is a right-sided disc herniation extending into the   right lateral recess. There is mass effect on the right S1 nerve root   which is displaced posteriorly. After contrast administration there is   normal osseous and vascular enhancement.    The conus terminates normally at the T12-L1 level. The paraspinal soft   tissues are unremarkable.    IMPRESSION: Right-sided disc herniation L5-S1 extending into the right   lateral recess with mass effect on the right S1 nerve.  No abnormal enhancement.    < end of copied text >    
POD # 1 S/P L5-S1 right hemilaminectomy and microdiscectomy  Patient C/O some incisional pain, reports significant improvement in preop pain and sensory loss    Vital Signs Last 24 Hrs  T(C): 36.9 (21 Feb 2025 01:55), Max: 36.9 (20 Feb 2025 10:25)  T(F): 98.4 (21 Feb 2025 01:55), Max: 98.4 (20 Feb 2025 10:25)  HR: 64 (21 Feb 2025 01:55) (60 - 90)  BP: 132/80 (21 Feb 2025 01:55) (114/75 - 167/109)  BP(mean): 96 (20 Feb 2025 13:00) (83 - 105)  RR: 20 (21 Feb 2025 01:55) (12 - 20)  SpO2: 97% (21 Feb 2025 01:55) (95% - 100%)    Parameters below as of 21 Feb 2025 01:55  Patient On (Oxygen Delivery Method): room air    AAO X 3  PERRLA, EOMI  Face symmetric  ALEX strength 5/5, RLE pain limited but improved  Mild sensory decrease lateral aspect of right foot and heel     MEDICATIONS  (STANDING):  gabapentin 100 milliGRAM(s) Oral two times a day  lactated ringers. 1000 milliLiter(s) (80 mL/Hr) IV Continuous <Continuous>  lidocaine   4% Patch 1 Patch Transdermal every 24 hours  methocarbamol 500 milliGRAM(s) Oral every 6 hours  pantoprazole    Tablet 40 milliGRAM(s) Oral before breakfast    MEDICATIONS  (PRN):  acetaminophen     Tablet .. 650 milliGRAM(s) Oral every 6 hours PRN Temp greater or equal to 38C (100.4F), Mild Pain (1 - 3)  HYDROmorphone  Injectable 1 milliGRAM(s) IV Push every 6 hours PRN Severe Pain (7 - 10)  oxyCODONE    IR 5 milliGRAM(s) Oral every 6 hours PRN Moderate Pain (4 - 6)                          13.3   11.43 )-----------( 375      ( 20 Feb 2025 05:34 )             38.9     02-20    139  |  103  |  17  ----------------------------<  116[H]  3.8   |  22  |  0.66    Ca    9.0      20 Feb 2025 05:34  Phos  3.9     02-20  Mg     2.00     02-20      
NEUROSURGERY POST OP CHECK   02-20-25 @ 13:33    Dx: 34y Female s/p L5-S1 microdiscectomy, lemuel-laminectomy    Patient seen and examined at bedside in PACU. She reports significant improvement in back and RLE pain. Voiding without any issues. She has not had anything to eat as of yet.    MEDICATIONS  (STANDING):  ceFAZolin   IVPB 2000 milliGRAM(s) IV Intermittent every 8 hours  gabapentin 100 milliGRAM(s) Oral two times a day  lactated ringers. 1000 milliLiter(s) (80 mL/Hr) IV Continuous <Continuous>  lidocaine   4% Patch 1 Patch Transdermal every 24 hours  methocarbamol 500 milliGRAM(s) Oral every 6 hours  pantoprazole    Tablet 40 milliGRAM(s) Oral before breakfast    MEDICATIONS  (PRN):  acetaminophen     Tablet .. 650 milliGRAM(s) Oral every 6 hours PRN Temp greater or equal to 38C (100.4F), Mild Pain (1 - 3)  HYDROmorphone  Injectable 1 milliGRAM(s) IV Push every 6 hours PRN Severe Pain (7 - 10)  HYDROmorphone  Injectable 0.5 milliGRAM(s) IV Push every 10 minutes PRN Moderate Pain (4 - 6)  HYDROmorphone  Injectable 1 milliGRAM(s) IV Push every 10 minutes PRN Severe Pain (7 - 10)  ondansetron Injectable 4 milliGRAM(s) IV Push once PRN Nausea and/or Vomiting  oxyCODONE    IR 5 milliGRAM(s) Oral every 6 hours PRN Moderate Pain (4 - 6)                          13.3   11.43 )-----------( 375      ( 20 Feb 2025 05:34 )             38.9     02-20    139  |  103  |  17  ----------------------------<  116[H]  3.8   |  22  |  0.66    Ca    9.0      20 Feb 2025 05:34  Phos  3.9     02-20  Mg     2.00     02-20      I&O's Summary    20 Feb 2025 07:01  -  20 Feb 2025 13:33  --------------------------------------------------------  IN: 340 mL / OUT: 0 mL / NET: 340 mL        T(C): 36.7 (02-20-25 @ 13:00), Max: 36.9 (02-20-25 @ 10:25)  HR: 81 (02-20-25 @ 13:00) (69 - 90)  BP: 126/82 (02-20-25 @ 13:00) (117/73 - 130/90)  RR: 12 (02-20-25 @ 13:00) (12 - 20)  SpO2: 97% (02-20-25 @ 13:00) (95% - 99%)    PHYSICAL EXAM:  AOx3, Following Commands, Face symmetrical  EOMI, PERRL    RUE MOTOR:   Delt 5/5  Bicep 5/5  Tricep 5/5      HG 5/5      LUE MOTOR:   Delt 5/5  Bicep 5/5   Tricep 5/5     HG 5/5     RLE MOTOR:   HF 5/5            KF 5/5          KE 5/5         DF 5/5         PF 5/5    EHL 5/5    LLE MOTOR:   HF 5/5        KF 5/5          KE 5/5            DF 5/5            PF 5/5       EHL 5/5      SENSATION: : decreased sensation to the lateral aspect of the right leg and foot, dorsal aspect of right foot (stable compared to pre-op)    REFLEXES:  No clonus  No Hoffmans

## 2025-02-21 NOTE — DISCHARGE NOTE PROVIDER - NSDCMRMEDTOKEN_GEN_ALL_CORE_FT
ibuprofen 600 mg oral tablet: 1 tab(s) orally every 6 hours  Medrol Dosepak 4 mg oral tablet: orally take as directed. To be completed 2/19   acetaminophen 500 mg oral tablet: 2 tab(s) orally every 6 hours   acetaminophen 500 mg oral tablet: 2 tab(s) orally every 6 hours  methocarbamol 500 mg oral tablet: 1 tab(s) orally every 6 hours as needed for  muscle spasm MDD: 2000mg  Oxaydo 5 mg oral tablet: 1 tab(s) orally every 6 hours as needed for Moderate Pain (4 - 6) MDD: 20mg

## 2025-02-21 NOTE — DISCHARGE NOTE NURSING/CASE MANAGEMENT/SOCIAL WORK - NSDCPEFALRISK_GEN_ALL_CORE
For information on Fall & Injury Prevention, visit: [email or web address] OR  [email or web address] OR  https://www.cdc.gov/steadi/patient.html

## 2025-03-03 NOTE — ED ADULT NURSE NOTE - NS ED NOTE ABUSE SUSPICION NEGLECT YN
Routed to Device PSR Pool to schedule appointment(s). Device check order entered.     Please route back to [facility] Clinical Device pool with appointment date/ time.     Appointment Notes- MDT DC ICD yearly      Thank you    No

## 2025-03-04 ENCOUNTER — APPOINTMENT (OUTPATIENT)
Dept: NEUROSURGERY | Facility: CLINIC | Age: 35
End: 2025-03-04

## 2025-03-04 VITALS
HEIGHT: 67 IN | SYSTOLIC BLOOD PRESSURE: 131 MMHG | BODY MASS INDEX: 31.39 KG/M2 | WEIGHT: 200 LBS | DIASTOLIC BLOOD PRESSURE: 93 MMHG | HEART RATE: 80 BPM

## 2025-03-04 PROCEDURE — 99024 POSTOP FOLLOW-UP VISIT: CPT

## 2025-03-25 ENCOUNTER — APPOINTMENT (OUTPATIENT)
Dept: NEUROSURGERY | Facility: CLINIC | Age: 35
End: 2025-03-25

## 2025-04-22 PROBLEM — M54.30 SCIATICA: Status: ACTIVE | Noted: 2025-04-22

## 2025-06-23 NOTE — OB PST NOTE - PSH
Opt out
ASD (atrial septal defect)  repair 2002; followed by Dr Cortez  H/O abdominoplasty    H/O bilateral breast reduction surgery  2018  H/O  section  2007 35wk PEC 4#8 male  3/21/2014 scheduled c/s GDMA1 7#5 male  Hernia  b/l 1998  Preeclampsia  2007

## 2025-09-09 ENCOUNTER — APPOINTMENT (OUTPATIENT)
Dept: NEUROSURGERY | Facility: CLINIC | Age: 35
End: 2025-09-09
Payer: COMMERCIAL

## 2025-09-09 VITALS
BODY MASS INDEX: 30.29 KG/M2 | SYSTOLIC BLOOD PRESSURE: 136 MMHG | HEIGHT: 67 IN | DIASTOLIC BLOOD PRESSURE: 92 MMHG | HEART RATE: 62 BPM | WEIGHT: 193 LBS

## 2025-09-09 DIAGNOSIS — G89.29 OTHER CHRONIC PAIN: ICD-10-CM

## 2025-09-09 DIAGNOSIS — M54.41 OTHER CHRONIC PAIN: ICD-10-CM

## 2025-09-09 DIAGNOSIS — M54.42 OTHER CHRONIC PAIN: ICD-10-CM

## 2025-09-09 PROCEDURE — 99213 OFFICE O/P EST LOW 20 MIN: CPT

## 2025-09-19 ENCOUNTER — APPOINTMENT (OUTPATIENT)
Dept: MRI IMAGING | Facility: CLINIC | Age: 35
End: 2025-09-19
Payer: COMMERCIAL

## 2025-09-19 ENCOUNTER — APPOINTMENT (OUTPATIENT)
Dept: RADIOLOGY | Facility: CLINIC | Age: 35
End: 2025-09-19
Payer: COMMERCIAL

## 2025-09-19 DIAGNOSIS — Z86.59 PERSONAL HISTORY OF OTHER MENTAL AND BEHAVIORAL DISORDERS: ICD-10-CM

## 2025-09-19 PROCEDURE — 72148 MRI LUMBAR SPINE W/O DYE: CPT

## 2025-09-19 RX ORDER — DIAZEPAM 5 MG/1
5 TABLET ORAL
Qty: 2 | Refills: 0 | Status: COMPLETED | COMMUNITY
Start: 2025-09-19 | End: 2025-09-20

## (undated) DEVICE — DRSG TEGADERM 4 X 4.75"

## (undated) DEVICE — Device

## (undated) DEVICE — DRAPE SURGICAL #1010

## (undated) DEVICE — WOUND IRR SURGIPHOR

## (undated) DEVICE — DRAPE TOWEL BLUE STICKY

## (undated) DEVICE — CANISTER DISPOSABLE THIN WALL 3000CC

## (undated) DEVICE — POSITIONER CUSHION INSERT PRONE VIEW LG

## (undated) DEVICE — SUT VICRYL PLUS 0 27" OS-6 UNDYED

## (undated) DEVICE — NDL HYPO REGULAR BEVEL 25G X 1.5" (BLUE)

## (undated) DEVICE — FOLEY CATH 2-WAY 16FR 5CC SILICONE

## (undated) DEVICE — SUT VICRYL 2-0 18" CT-2 (POP-OFF)

## (undated) DEVICE — SOL IRR POUR H2O 500ML

## (undated) DEVICE — GOWN XL

## (undated) DEVICE — TUBING CODMAN INTEGRATED BIPOLAR CORD & TUBING SET FLYING LEADS

## (undated) DEVICE — DRAPE INSTRUMENT POUCH 6.75" X 11"

## (undated) DEVICE — TAPE SILK 3"

## (undated) DEVICE — DRAPE C ARM C-ARMOUR

## (undated) DEVICE — ELCTR GROUNDING PAD ADULT COVIDIEN

## (undated) DEVICE — DRSG DERMABOND 0.7ML

## (undated) DEVICE — MIDAS REX LEGEND BALL DIAMOND LG BORE 6.0MM X 9CM

## (undated) DEVICE — DRAPE 3/4 SHEET 52X76"

## (undated) DEVICE — SUT ETHILON 3-0 18" FS-1

## (undated) DEVICE — DRSG CURITY GAUZE SPONGE 4 X 4" 12-PLY

## (undated) DEVICE — LIJ-KMEDIC KERRISON RONGUER: Type: DURABLE MEDICAL EQUIPMENT

## (undated) DEVICE — SUT MONOCRYL 3-0 27" PS-2 UNDYED

## (undated) DEVICE — DRAPE COVER SNAP 36X30"

## (undated) DEVICE — POSITIONER STRAP ARMBOARD VELCRO TS-30

## (undated) DEVICE — SPONGE X-RAY 4X8"

## (undated) DEVICE — NDL HYPO SAFE 21G X 1.5" (GREEN)

## (undated) DEVICE — WARMING BLANKET UPPER ADULT

## (undated) DEVICE — KIT CLOSED WOUND SUCTION 400 MED 1/8 10FR

## (undated) DEVICE — SUT VICRYL PLUS 2-0 27" CP-1 UNDYED

## (undated) DEVICE — MIDAS REX LEGEND MATCH HEAD FLUTED LG BORE 3.0MM X 14CM

## (undated) DEVICE — POSITIONER FOAM EGG CRATE ULNAR 2PCS (PINK)

## (undated) DEVICE — DRSG TELFA 3 X 8

## (undated) DEVICE — DRSG STERISTRIPS 0.5 X 4"

## (undated) DEVICE — SUT VICRYL 6-0 18" P-3 UNDYED

## (undated) DEVICE — POSITIONER JACKSON TABLE PRONEVIEW CUSHION, CHEST, HIP, THIGH PADS

## (undated) DEVICE — SUT VICRYL PLUS 2-0 27" FS-1 UNDYED

## (undated) DEVICE — STAPLER SKIN MULTI DIRECTION W35

## (undated) DEVICE — LABELS BLANK W PEN

## (undated) DEVICE — ELCTR BOVIE PENCIL BLADE 10FT

## (undated) DEVICE — FOLEY TRAY 16FR 5CC LF UMETER CLOSED

## (undated) DEVICE — SOL IRR BAG NS 0.9% 1000ML

## (undated) DEVICE — DRAPE STERILE-Z PATIENT

## (undated) DEVICE — MIDAS REX MR8 MATCH HEAD FLUTED LG BORE 3MM X 14CM

## (undated) DEVICE — DRAPE MINOR PROCEDURE

## (undated) DEVICE — PACK NEURO

## (undated) DEVICE — PREP DURAPREP 26CC

## (undated) DEVICE — DRAPE GENERAL ENDOSCOPY

## (undated) DEVICE — NDL SPINAL 18G X 3.5" (PINK)

## (undated) DEVICE — SUT VICRYL 1 36" CT-1 UNDYED

## (undated) DEVICE — SUT VICRYL 1 36" CTX UNDYED

## (undated) DEVICE — LIJ-METRX INSTRUMENT TRAY: Type: DURABLE MEDICAL EQUIPMENT

## (undated) DEVICE — GLV 8 PROTEXIS (WHITE)

## (undated) DEVICE — GLV 7.5 PROTEXIS (CREAM) MICRO

## (undated) DEVICE — VENODYNE/SCD SLEEVE CALF MEDIUM

## (undated) DEVICE — BIPOLAR FORCEP STRYKER STANDARD 9" X 1MM (YELLOW)

## (undated) DEVICE — DRAPE BACK TABLE COVER 44X90"